# Patient Record
Sex: MALE | Race: WHITE | NOT HISPANIC OR LATINO | Employment: OTHER | URBAN - METROPOLITAN AREA
[De-identification: names, ages, dates, MRNs, and addresses within clinical notes are randomized per-mention and may not be internally consistent; named-entity substitution may affect disease eponyms.]

---

## 2017-01-03 ENCOUNTER — TELEPHONE (OUTPATIENT)
Dept: UROLOGY | Facility: CLINIC | Age: 82
End: 2017-01-03

## 2017-01-03 NOTE — TELEPHONE ENCOUNTER
----- Message from Giovana Rivera sent at 1/3/2017 12:24 PM CST -----  Contact: Kassy lEena needs you to call her to discuss patient. Please call her at [phone].

## 2017-01-05 ENCOUNTER — TELEPHONE (OUTPATIENT)
Dept: UROLOGY | Facility: CLINIC | Age: 82
End: 2017-01-05

## 2017-05-09 ENCOUNTER — TELEPHONE (OUTPATIENT)
Dept: UROLOGY | Facility: CLINIC | Age: 82
End: 2017-05-09

## 2017-05-22 ENCOUNTER — PROCEDURE VISIT (OUTPATIENT)
Dept: UROLOGY | Facility: CLINIC | Age: 82
End: 2017-05-22
Payer: MEDICARE

## 2017-05-22 VITALS — WEIGHT: 174.19 LBS | HEIGHT: 72 IN | BODY MASS INDEX: 23.59 KG/M2

## 2017-05-22 DIAGNOSIS — C67.9 MALIGNANT NEOPLASM OF URINARY BLADDER, UNSPECIFIED SITE: Primary | ICD-10-CM

## 2017-05-22 LAB
BILIRUB SERPL-MCNC: ABNORMAL MG/DL
BLOOD URINE, POC: ABNORMAL
COLOR, POC UA: YELLOW
GLUCOSE UR QL STRIP: ABNORMAL
KETONES UR QL STRIP: ABNORMAL
LEUKOCYTE ESTERASE URINE, POC: ABNORMAL
NITRITE, POC UA: ABNORMAL
PH, POC UA: 7
PROTEIN, POC: ABNORMAL
SPECIFIC GRAVITY, POC UA: 1.01
UROBILINOGEN, POC UA: ABNORMAL

## 2017-05-22 PROCEDURE — 88112 CYTOPATH CELL ENHANCE TECH: CPT | Mod: 26,,, | Performed by: PATHOLOGY

## 2017-05-22 PROCEDURE — 52000 CYSTOURETHROSCOPY: CPT | Mod: PBBFAC,PO | Performed by: UROLOGY

## 2017-05-22 PROCEDURE — 88112 CYTOPATH CELL ENHANCE TECH: CPT | Performed by: PATHOLOGY

## 2017-05-22 PROCEDURE — 81002 URINALYSIS NONAUTO W/O SCOPE: CPT | Mod: PBBFAC,PO | Performed by: UROLOGY

## 2017-05-22 NOTE — PROCEDURES
"Cystoscopy  Date/Time: 5/22/2017 11:17 AM  Performed by: WISAM ROMAN  Authorized by: WISAM ROMAN     Consent Done?:  Yes (Written)  Time out: Immediately prior to procedure a "time out" was called to verify the correct patient, procedure, equipment, support staff and site/side marked as required.    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 He completed a course of BCG. He is asymptomatic today. Cysto 12 months ago was negative. Last recurrence 4/14. He has no complaints today.  He dysuria.  Head hematuria on a couple of occasion in the last year.    The flexible cystoscope was placed into the urethra and carefully advanced into the bladder.  A careful cystoscopic exam was then performed.  The entire bladder mucosa was systematically visualized.   Findings include moderate bladder wall trabeculation.  There was a possible small recurrence on right posterior wall.  Otherwise the mucosa appeared normal   Each ureteral orifices were visualized and both had clear efflux of urine.  Barbotage was performed and washings were collected for cytological evaluation.  The cystoscope was then removed and I examined the entire length of the urethra.  There was moderate trilobar enlargement of the prostate otherwise the urethra appeared normal.  He tolerated the procedure well.  There were no complications    Impression:  ? Recurrence.  Will observe for now.  Follow up in 6 months with another cystoscopy.    "

## 2017-05-25 ENCOUNTER — TELEPHONE (OUTPATIENT)
Dept: UROLOGY | Facility: CLINIC | Age: 82
End: 2017-05-25

## 2017-05-25 NOTE — TELEPHONE ENCOUNTER
----- Message from WISAM Mccall MD sent at 5/24/2017  5:31 PM CDT -----  Call with urine cytology.  Negative.

## 2017-06-01 ENCOUNTER — OFFICE VISIT (OUTPATIENT)
Dept: SURGERY | Facility: CLINIC | Age: 82
End: 2017-06-01
Payer: MEDICARE

## 2017-06-01 VITALS
TEMPERATURE: 97 F | HEART RATE: 101 BPM | BODY MASS INDEX: 23.57 KG/M2 | SYSTOLIC BLOOD PRESSURE: 118 MMHG | HEIGHT: 72 IN | WEIGHT: 174 LBS | DIASTOLIC BLOOD PRESSURE: 64 MMHG

## 2017-06-01 DIAGNOSIS — K64.1 SECOND DEGREE HEMORRHOIDS: Primary | ICD-10-CM

## 2017-06-01 PROCEDURE — 99999 PR PBB SHADOW E&M-EST. PATIENT-LVL III: CPT | Mod: PBBFAC,,, | Performed by: SURGERY

## 2017-06-01 PROCEDURE — 99213 OFFICE O/P EST LOW 20 MIN: CPT | Mod: PBBFAC,PO | Performed by: SURGERY

## 2017-06-01 PROCEDURE — 99203 OFFICE O/P NEW LOW 30 MIN: CPT | Mod: S$PBB,25,, | Performed by: SURGERY

## 2017-06-01 PROCEDURE — 46600 DIAGNOSTIC ANOSCOPY SPX: CPT | Mod: PBBFAC,PO | Performed by: SURGERY

## 2017-06-01 PROCEDURE — 46600 DIAGNOSTIC ANOSCOPY SPX: CPT | Mod: S$PBB,,, | Performed by: SURGERY

## 2017-06-01 NOTE — PROGRESS NOTES
Subjective:       Patient ID: Fabian Shnie is a 85 y.o. male.    Chief Complaint: Consult (Hemorrhoids)    HPI  Pleasant 84 yo M referred to me for evaluation of his hemorrhoids.  He notes that for the last month he has had some swelling in the perianal that he feels is related to his hemorrhoids.  He notes that the swelling is somewhat uncomfortably.  He notes he has had some difficulty with hygiene secondary to this.  He denies fever/chills.  No n/v. No blood per rectum.  Pt denies ever having a cscope.   Review of Systems   Constitutional: Negative for activity change, appetite change, diaphoresis, fever and unexpected weight change.   Respiratory: Negative for cough, chest tightness and wheezing.    Cardiovascular: Negative for chest pain and palpitations.   Gastrointestinal: Negative for abdominal distention, abdominal pain, anal bleeding, blood in stool, constipation, diarrhea, nausea and vomiting.   Genitourinary: Negative for difficulty urinating, dysuria and hematuria.   Musculoskeletal: Negative for back pain and gait problem.   Skin: Negative for color change and wound.   Neurological: Negative for tremors and seizures.       Objective:      Physical Exam   Constitutional: He is oriented to person, place, and time. He appears well-developed and well-nourished.   HENT:   Head: Normocephalic and atraumatic.   Eyes: Conjunctivae are normal. Pupils are equal, round, and reactive to light.   Neck: Normal range of motion. Neck supple.   Cardiovascular: Normal rate and intact distal pulses.    No murmur heard.  Pulmonary/Chest: Breath sounds normal. No respiratory distress.   Abdominal: Soft. Bowel sounds are normal. He exhibits no distension and no mass. There is no tenderness. There is no rebound and no guarding. No hernia.   Genitourinary:   Genitourinary Comments: Ext exam demonstrates slight perianal skin tags from hermorrhoids on the L lateral and R ant position. Nonthrombosed.  BELTRAN with normal sphincter  tone.  Anoscopy performed with mild to moderate int hemorrhoids noted in the R ant , R post and L lateral columns.    Neurological: He is alert and oriented to person, place, and time.   Skin: Skin is warm.   Psychiatric: He has a normal mood and affect. His behavior is normal.   Vitals reviewed.      Assessment:     Hem orrhoids.   No diagnosis found.    Plan:       D/w pt.  I have recommended increasing fiber in male diet and to also begin using a fiber supplement (metamucil or psyillium husk).  Will monitor for improvement and if no significant improvement will consider more aggressive treatment.

## 2017-06-01 NOTE — LETTER
June 1, 2017      WISAM Mccall MD  [address] [facility] [address] [place] [address]           [facility]  [address] [facility] [address] [place] [address]  Phone: [phone]          Patient: Fabian Shine   MR Number: [id number]   YOB: 1932   Date of Visit: 6/1/2017       Dear Dr. WISAM Mccall:    Thank you for referring Fabian Shine to me for evaluation. Attached you will find relevant portions of my assessment and plan of care.    If you have questions, please do not hesitate to call me. I look forward to following Fabian Shine along with you.    Sincerely,    Willis Huang MD    Enclosure  CC:  No Recipients    If you would like to receive this communication electronically, please contact [email or web address] or [phone] to request more information on [employer] Link access.    For providers and/or their staff who would like to refer a patient to [facility], please contact us through our one-stop-shop provider referral line, [facility] , at [phone].    If you feel you have received this communication in error or would no longer like to receive these types of communications, please e-mail [email or web address]

## 2017-06-26 PROBLEM — N40.0 BENIGN NON-NODULAR PROSTATIC HYPERPLASIA WITHOUT LOWER URINARY TRACT SYMPTOMS: Status: ACTIVE | Noted: 2017-06-26

## 2017-06-26 PROBLEM — I50.22 CHRONIC SYSTOLIC CONGESTIVE HEART FAILURE: Status: ACTIVE | Noted: 2017-06-26

## 2017-06-26 PROBLEM — Z86.2 HISTORY OF IRON DEFICIENCY ANEMIA: Status: ACTIVE | Noted: 2017-06-26

## 2017-06-26 PROBLEM — G25.81 RESTLESS LEG SYNDROME, CONTROLLED: Status: ACTIVE | Noted: 2017-06-26

## 2017-08-17 ENCOUNTER — OFFICE VISIT (OUTPATIENT)
Dept: VASCULAR SURGERY | Facility: CLINIC | Age: 82
End: 2017-08-17
Payer: MEDICARE

## 2017-08-17 ENCOUNTER — TELEPHONE (OUTPATIENT)
Dept: VASCULAR SURGERY | Facility: CLINIC | Age: 82
End: 2017-08-17

## 2017-08-17 VITALS
WEIGHT: 176 LBS | SYSTOLIC BLOOD PRESSURE: 103 MMHG | HEIGHT: 72 IN | HEART RATE: 100 BPM | BODY MASS INDEX: 23.84 KG/M2 | DIASTOLIC BLOOD PRESSURE: 61 MMHG

## 2017-08-17 DIAGNOSIS — I73.9 PERIPHERAL VASCULAR DISEASE, UNSPECIFIED: Primary | ICD-10-CM

## 2017-08-17 DIAGNOSIS — I73.9 PAD (PERIPHERAL ARTERY DISEASE): ICD-10-CM

## 2017-08-17 PROCEDURE — 3008F BODY MASS INDEX DOCD: CPT | Mod: ,,, | Performed by: THORACIC SURGERY (CARDIOTHORACIC VASCULAR SURGERY)

## 2017-08-17 PROCEDURE — 99999 PR PBB SHADOW E&M-EST. PATIENT-LVL III: CPT | Mod: PBBFAC,,, | Performed by: THORACIC SURGERY (CARDIOTHORACIC VASCULAR SURGERY)

## 2017-08-17 PROCEDURE — 1159F MED LIST DOCD IN RCRD: CPT | Mod: ,,, | Performed by: THORACIC SURGERY (CARDIOTHORACIC VASCULAR SURGERY)

## 2017-08-17 PROCEDURE — 3078F DIAST BP <80 MM HG: CPT | Mod: ,,, | Performed by: THORACIC SURGERY (CARDIOTHORACIC VASCULAR SURGERY)

## 2017-08-17 PROCEDURE — 3074F SYST BP LT 130 MM HG: CPT | Mod: ,,, | Performed by: THORACIC SURGERY (CARDIOTHORACIC VASCULAR SURGERY)

## 2017-08-17 PROCEDURE — 99213 OFFICE O/P EST LOW 20 MIN: CPT | Mod: PBBFAC,PO | Performed by: THORACIC SURGERY (CARDIOTHORACIC VASCULAR SURGERY)

## 2017-08-17 PROCEDURE — 1126F AMNT PAIN NOTED NONE PRSNT: CPT | Mod: ,,, | Performed by: THORACIC SURGERY (CARDIOTHORACIC VASCULAR SURGERY)

## 2017-08-17 PROCEDURE — 99203 OFFICE O/P NEW LOW 30 MIN: CPT | Mod: S$PBB,,, | Performed by: THORACIC SURGERY (CARDIOTHORACIC VASCULAR SURGERY)

## 2017-08-17 RX ORDER — SODIUM CHLORIDE 9 MG/ML
INJECTION, SOLUTION INTRAVENOUS CONTINUOUS
Status: CANCELLED | OUTPATIENT
Start: 2017-08-17

## 2017-08-17 NOTE — PROGRESS NOTES
OFFICE NOTE    This 85-year-old gentleman with bilateral lower extremity claudication, has a   history of vascular disease and angiography, previous stenting of the right   lower extremity.  He has a sore in the left great toe that has been present for   a fairly long period of time without healing.  He has seen a podiatrist to his   exercising local care for the left great toe.  He has chronic atrial   fibrillation.  His medicines are noted.  They are part of the recent EPIC   record.  He is taking Xarelto.  He has had no recent surgeries.  Nonsmoker.  He   is not diabetic.  Denies any significant coronary disease.    PHYSICAL EXAMINATION:  VITAL SIGNS:  Stable.  HEENT:  Pupils are equal, round, reactive to light.  Nose and throat are clear.  NECK:  Supple.  CHEST:  Clear to auscultation.  HEART:  Irregular rate and rhythm.  ABDOMEN:  Benign.  EXTREMITIES:  Femoral pulses are equal.  Pedal pulses are not palpable.  He has   a tiny punctate ulcer on the right great toe associated with slight erythema.    Doppler signals diminished.  The most recent ultrasound was from some months ago   and he appears to have significant popliteal artery occlusive disease.  At this   point, we will arrange for angiography of the extremities and based on this,   make further recommendations.  Hopefully, we can improve the circulation to the   left lower extremity.      PATEL  dd: 08/17/2017 08:42:25 (CDT)  td: 08/17/2017 15:38:54 (CDT)  Doc ID   #[id number]  Job ID #[id number]    CC:

## 2017-08-17 NOTE — TELEPHONE ENCOUNTER
----- Message from Barb Garibay sent at 8/17/2017  9:54 AM CDT -----  Contact: wife  Wife - Kassy Shine - [phone] is calling to speak with nurse asking if patient needs to stop his Xarelto 3 days before angiogram?/please advise

## 2017-08-17 NOTE — LETTER
August 17, 2017      [address] [address]           [facility]  [address] [facility] [address] [place] [address]  Phone: [phone]          Patient: Fabian Shine   MR Number: [id number]   YOB: 1932   Date of Visit: 8/17/2017       Dear [facility]:    Thank you for referring Fabian Shine to me for evaluation. Attached you will find relevant portions of my assessment and plan of care.    If you have questions, please do not hesitate to call me. I look forward to following Fabian Shine along with you.    Sincerely,    Sundeep Cota MD    Enclosure  CC:  No Recipients    If you would like to receive this communication electronically, please contact [email or web address] or [phone] to request more information on [employer] Link access.    For providers and/or their staff who would like to refer a patient to [facility], please contact us through our one-stop-shop provider referral line, [facility], at [phone].    If you feel you have received this communication in error or would no longer like to receive these types of communications, please e-mail [email or web address]

## 2017-08-23 PROBLEM — I73.9 PERIPHERAL VASCULAR DISEASE, UNSPECIFIED: Status: ACTIVE | Noted: 2017-08-23

## 2017-08-24 ENCOUNTER — TELEPHONE (OUTPATIENT)
Dept: VASCULAR SURGERY | Facility: CLINIC | Age: 82
End: 2017-08-24

## 2017-08-24 NOTE — TELEPHONE ENCOUNTER
----- Message from Sonia Luu sent at 8/24/2017 11:56 AM CDT -----  Contact: Sina Shine (Spouse)  Sina Shine (Spouse) calling in regards to scheduling a f/u appt after the angiogram yesterday. I informed the spouse, I am to schedule it but she insists on speaking with the Nurse. Please advise. Call to pod. No answer.  Call back   Thanks!

## 2017-08-31 ENCOUNTER — OFFICE VISIT (OUTPATIENT)
Dept: VASCULAR SURGERY | Facility: CLINIC | Age: 82
End: 2017-08-31
Payer: MEDICARE

## 2017-08-31 VITALS — DIASTOLIC BLOOD PRESSURE: 65 MMHG | HEART RATE: 88 BPM | SYSTOLIC BLOOD PRESSURE: 120 MMHG

## 2017-08-31 DIAGNOSIS — Z98.62 S/P ANGIOPLASTIES: Primary | ICD-10-CM

## 2017-08-31 PROCEDURE — 99212 OFFICE O/P EST SF 10 MIN: CPT | Mod: PBBFAC,PO | Performed by: THORACIC SURGERY (CARDIOTHORACIC VASCULAR SURGERY)

## 2017-08-31 PROCEDURE — 99999 PR PBB SHADOW E&M-EST. PATIENT-LVL II: CPT | Mod: PBBFAC,,, | Performed by: THORACIC SURGERY (CARDIOTHORACIC VASCULAR SURGERY)

## 2017-08-31 PROCEDURE — 99211 OFF/OP EST MAY X REQ PHY/QHP: CPT | Mod: S$PBB,,, | Performed by: THORACIC SURGERY (CARDIOTHORACIC VASCULAR SURGERY)

## 2017-11-20 ENCOUNTER — PROCEDURE VISIT (OUTPATIENT)
Dept: UROLOGY | Facility: CLINIC | Age: 82
End: 2017-11-20
Payer: MEDICARE

## 2017-11-20 VITALS
BODY MASS INDEX: 23.59 KG/M2 | HEIGHT: 72 IN | HEART RATE: 56 BPM | SYSTOLIC BLOOD PRESSURE: 110 MMHG | WEIGHT: 174.19 LBS | DIASTOLIC BLOOD PRESSURE: 74 MMHG

## 2017-11-20 DIAGNOSIS — N30.91 CYSTITIS WITH HEMATURIA: ICD-10-CM

## 2017-11-20 DIAGNOSIS — C67.9 MALIGNANT NEOPLASM OF URINARY BLADDER, UNSPECIFIED SITE: Primary | ICD-10-CM

## 2017-11-20 LAB
BILIRUB SERPL-MCNC: ABNORMAL MG/DL
BLOOD URINE, POC: ABNORMAL
COLOR, POC UA: YELLOW
GLUCOSE UR QL STRIP: ABNORMAL
KETONES UR QL STRIP: ABNORMAL
LEUKOCYTE ESTERASE URINE, POC: ABNORMAL
NITRITE, POC UA: ABNORMAL
PH, POC UA: 8
PROTEIN, POC: ABNORMAL
SPECIFIC GRAVITY, POC UA: 1
UROBILINOGEN, POC UA: ABNORMAL

## 2017-11-20 PROCEDURE — 87086 URINE CULTURE/COLONY COUNT: CPT

## 2017-11-20 PROCEDURE — 87077 CULTURE AEROBIC IDENTIFY: CPT

## 2017-11-20 PROCEDURE — 81002 URINALYSIS NONAUTO W/O SCOPE: CPT | Mod: PBBFAC,PO | Performed by: UROLOGY

## 2017-11-20 PROCEDURE — 99213 OFFICE O/P EST LOW 20 MIN: CPT | Mod: S$PBB,,, | Performed by: UROLOGY

## 2017-11-20 PROCEDURE — 87088 URINE BACTERIA CULTURE: CPT

## 2017-11-20 PROCEDURE — 87186 SC STD MICRODIL/AGAR DIL: CPT

## 2017-11-20 RX ORDER — CIPROFLOXACIN 500 MG/1
500 TABLET ORAL 2 TIMES DAILY
Qty: 20 TABLET | Refills: 0 | Status: SHIPPED | OUTPATIENT
Start: 2017-11-20 | End: 2017-12-04

## 2017-11-20 NOTE — PROCEDURES
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 He completed a course of BCG. He is having intermittent hematuria and occasional dysuria.  Last cysto 12 months ago was negative. Last recurrence 4/14.   He is scheduled for surveillance cystoscopy today but UA suggest an infection.  Urine dipstick shows positive for 2+blood, positive for nitrates and positive for 2+leukocytes.  Micro exam: tntc WBC's per HPF, tntc RBC's per HPF and 2+ bacteria.    Review of Systems   Constitutional: Negative for fever and unexpected weight change.   Genitourinary: Positive for dysuria and hematuria.   Musculoskeletal: Negative for back pain and gait problem.   Skin: No rash      Objective:      Physical Exam   Constitutional: He is oriented to person, place, and time. He appears well-developed and well-nourished.   HENT:   Head: Normocephalic and atraumatic.   Eyes: Conjunctivae are normal.   Pulmonary/Chest: No respiratory distress.   Abdominal: Soft. no distension and no mass. There is no tenderness.   Genitourinary:  No CVA tenderness  Neurological: He is alert and oriented to person, place, and time.   Psychiatric: He has a normal mood and affect. His behavior is normal.   Vitals reviewed.      Assessment:     Bladder cancer  Cystitis with hematuria.    Plan  Hold cystoscopy for now  F/u urine culture.  Empiric Cipro.  Rx sent to pharmacy

## 2017-11-25 LAB — BACTERIA UR CULT: NORMAL

## 2017-12-06 ENCOUNTER — PROCEDURE VISIT (OUTPATIENT)
Dept: UROLOGY | Facility: CLINIC | Age: 82
End: 2017-12-06
Payer: MEDICARE

## 2017-12-06 VITALS
BODY MASS INDEX: 23.59 KG/M2 | SYSTOLIC BLOOD PRESSURE: 122 MMHG | HEIGHT: 72 IN | HEART RATE: 69 BPM | WEIGHT: 174.19 LBS | DIASTOLIC BLOOD PRESSURE: 70 MMHG

## 2017-12-06 DIAGNOSIS — Z85.51 HISTORY OF BLADDER CANCER: Primary | ICD-10-CM

## 2017-12-06 LAB
BILIRUB SERPL-MCNC: ABNORMAL MG/DL
BLOOD URINE, POC: ABNORMAL
COLOR, POC UA: YELLOW
GLUCOSE UR QL STRIP: ABNORMAL
KETONES UR QL STRIP: ABNORMAL
LEUKOCYTE ESTERASE URINE, POC: ABNORMAL
NITRITE, POC UA: ABNORMAL
PH, POC UA: 8
PROTEIN, POC: ABNORMAL
SPECIFIC GRAVITY, POC UA: 1.01
UROBILINOGEN, POC UA: ABNORMAL

## 2017-12-06 PROCEDURE — 88112 CYTOPATH CELL ENHANCE TECH: CPT | Performed by: PATHOLOGY

## 2017-12-06 PROCEDURE — 81002 URINALYSIS NONAUTO W/O SCOPE: CPT | Mod: PBBFAC,PO | Performed by: UROLOGY

## 2017-12-06 PROCEDURE — 88112 CYTOPATH CELL ENHANCE TECH: CPT | Mod: 26,,, | Performed by: PATHOLOGY

## 2017-12-06 PROCEDURE — 52000 CYSTOURETHROSCOPY: CPT | Mod: PBBFAC,PO | Performed by: UROLOGY

## 2017-12-06 NOTE — PROCEDURES
"Cystoscopy  Date/Time: 12/6/2017 3:19 PM  Performed by: WISAM ROMAN  Authorized by: WISAM ROMAN     Consent Done?:  Yes (Written)  Time out: Immediately prior to procedure a "time out" was called to verify the correct patient, procedure, equipment, support staff and site/side marked as required.    Indications: history bladder cancer    Anesthesia:  Lidocaine jelly  Patient sedated?: No    Preparation: Patient was prepped and draped in usual sterile fashion      Scope type:  Flexible cystoscope    Patient tolerance:  Patient tolerated the procedure well with no immediate complications    Blood Loss:  None    The patients clinic history and physical were reviewed and there are no changes.    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 He completed a course of BCG. He is asymptomatic today. Cysto 6 months ago was negative except one suspicious area. Last recurrence 4/14. He has no complaints today.  He dysuria.         The flexible cystoscope was placed into the urethra and carefully advanced into the bladder.  A careful cystoscopic exam was then performed.  The entire bladder mucosa was systematically visualized.   Findings include moderate bladder wall trabeculation.  There was a possible small recurrence on right posterior wall.  This is unchanged in the last 6 months.  Otherwise the mucosa appeared normal   Each ureteral orifices were visualized and both had clear efflux of urine.  Barbotage was performed and washings were collected for cytological evaluation.  The cystoscope was then removed and I examined the entire length of the urethra.  There was moderate trilobar enlargement of the prostate otherwise the urethra appeared normal.  He tolerated the procedure well.  There were no complications     Impression:  ? Recurrence.  We will continue to  observe for now.  Follow up in 12 months with " another cystoscopy.

## 2018-01-19 ENCOUNTER — OFFICE VISIT (OUTPATIENT)
Dept: SURGERY | Facility: CLINIC | Age: 83
End: 2018-01-19
Payer: MEDICARE

## 2018-01-19 VITALS
SYSTOLIC BLOOD PRESSURE: 125 MMHG | BODY MASS INDEX: 23.81 KG/M2 | HEIGHT: 72 IN | TEMPERATURE: 97 F | DIASTOLIC BLOOD PRESSURE: 60 MMHG | WEIGHT: 175.81 LBS | HEART RATE: 73 BPM

## 2018-01-19 DIAGNOSIS — K64.1 GRADE II HEMORRHOIDS: Primary | ICD-10-CM

## 2018-01-19 DIAGNOSIS — L29.0 PRURITUS ANI: ICD-10-CM

## 2018-01-19 PROCEDURE — 99213 OFFICE O/P EST LOW 20 MIN: CPT | Mod: S$PBB,25,, | Performed by: SURGERY

## 2018-01-19 PROCEDURE — 99213 OFFICE O/P EST LOW 20 MIN: CPT | Mod: PBBFAC,PO,25 | Performed by: SURGERY

## 2018-01-19 PROCEDURE — 46600 DIAGNOSTIC ANOSCOPY SPX: CPT | Mod: S$PBB,,, | Performed by: SURGERY

## 2018-01-19 PROCEDURE — 46600 DIAGNOSTIC ANOSCOPY SPX: CPT | Mod: PBBFAC,PO | Performed by: SURGERY

## 2018-01-19 PROCEDURE — 99999 PR PBB SHADOW E&M-EST. PATIENT-LVL III: CPT | Mod: PBBFAC,,, | Performed by: SURGERY

## 2018-01-19 NOTE — PROGRESS NOTES
Pleasant 84 yo M whom I have seen in the past regarding his hemorrhoids.  He notes that he has had increased pressure and swelling internally that is causing him discomfort.  NOtes that he has had some bleeding and that in recent months it has become very debilitating.  Pt denies n/v.  No fever/chills.      Pt is on xarelto      AFVSS  AAOx  Soft/nd  REctal: exam performed in L lateral position as pt is extremely debilitated and weakened.    MIld ext hemorrhoids noted in L lateral and R ant position.  BELTRAN with normal sphicnter tone.  Anoscopy demonstrating moderates sized int hemorrhoid in R ant, L lateral position    A?P: Hemorrhoids; pruritus ani    D/w pt.  I have recommended increasing fiber in male diet and to also begin using a fiber supplement (metamucil or psyillium husk).  Will monitor for improvement and if no significant improvement will consider more aggressive treatment  Also encouraged barrier cream to be applied daily

## 2018-01-19 NOTE — LETTER
January 19, 2018      Trey Hanks, DO  [address]  [address]  [place] [address]           [facility]  [address] [facility] [address] [place] [address]  Phone: [phone]          Patient: Fabian Shine   MR Number: [id number]   YOB: 1932   Date of Visit: 1/19/2018       Dear Dr. Trey Hanks:    Thank you for referring Fabian Shine to me for evaluation. Attached you will find relevant portions of my assessment and plan of care.    If you have questions, please do not hesitate to call me. I look forward to following Fabian Shine along with you.    Sincerely,    Willis Huang MD    Enclosure  CC:  No Recipients    If you would like to receive this communication electronically, please contact [email or web address] or [phone] to request more information on [employer] Link access.    For providers and/or their staff who would like to refer a patient to [facility], please contact us through our one-stop-shop provider referral line, [facility], at [phone].    If you feel you have received this communication in error or would no longer like to receive these types of communications, please e-mail [email or web address]

## 2018-04-09 ENCOUNTER — TELEPHONE (OUTPATIENT)
Dept: SURGERY | Facility: CLINIC | Age: 83
End: 2018-04-09

## 2018-04-09 NOTE — TELEPHONE ENCOUNTER
----- Message from Loan Rubalcava sent at 4/9/2018  9:12 AM CDT -----  Contact: Nathalie with Dr Redmond Office   Dr Redmond [facility] needs to have nurse fax over Clinical notes for patient     Please fax to [phone]

## 2018-04-17 PROBLEM — K64.9 HEMORRHOIDS: Status: ACTIVE | Noted: 2018-04-17

## 2018-04-26 ENCOUNTER — OFFICE VISIT (OUTPATIENT)
Dept: SURGERY | Facility: CLINIC | Age: 83
End: 2018-04-26
Payer: MEDICARE

## 2018-04-26 VITALS — SYSTOLIC BLOOD PRESSURE: 123 MMHG | DIASTOLIC BLOOD PRESSURE: 58 MMHG | HEART RATE: 94 BPM

## 2018-04-26 DIAGNOSIS — K62.89 MASS IN RECTUM: Primary | ICD-10-CM

## 2018-04-26 DIAGNOSIS — C20 RECTAL CANCER: ICD-10-CM

## 2018-04-26 DIAGNOSIS — K62.89 OTHER SPECIFIED DISEASES OF ANUS AND RECTUM: ICD-10-CM

## 2018-04-26 PROCEDURE — 99999 PR PBB SHADOW E&M-EST. PATIENT-LVL III: CPT | Mod: PBBFAC,,, | Performed by: SURGERY

## 2018-04-26 PROCEDURE — 99213 OFFICE O/P EST LOW 20 MIN: CPT | Mod: PBBFAC,PO | Performed by: SURGERY

## 2018-04-26 PROCEDURE — 99214 OFFICE O/P EST MOD 30 MIN: CPT | Mod: S$PBB,,, | Performed by: SURGERY

## 2018-04-26 RX ORDER — BROMPHENIRAMINE MALEATE, DEXTROMETHORPHAN HBR, PHENYLEPHRINE HCL, DIPHENHYDRAMINE HCL, PHENYLEPHRINE HCL 0.52G
0.52 KIT ORAL DAILY
COMMUNITY
End: 2020-06-11

## 2018-04-26 NOTE — PATIENT INSTRUCTIONS
Flexible sigmoidoscopy is scheduled for 05/03/18 arrival time per the preop nurse.  Preop will call from [phone]  Fasting after midnight  Someone to drive you home    PLEASE NOTE THAT SURGERY TIMES CAN CHANGE THE PREOP NURSE WILL GIVE THE ARRIVAL TIME WHEN SHE/HE CALLS.  THE PREOP NURSE WILL CALL, SOMETIMES AS LATE AS 4 PM IN THE AFTERNOON THE DAY BEFORE SURGERY.        Special Instruction: Fleet Enema Prep

## 2018-04-26 NOTE — PROGRESS NOTES
Subjective:       Patient ID: Fabian Shine is a 86 y.o. male.    Chief Complaint: Follow-up    HPI  Pleasant 85 yo M whom I  Have seen in the past for hemorrhoids.  Pt had never had a colonoscopy and at time of my visit was not interested.  Pt notes that he had progressive changes in bowel habits leading to him having a colonoscopy with Dr Redmond which demonstrated a mass in mid to upper rectum between 2nd and third valve of [place].  This was estimated to be 12 cm from anal verge.  Pathology unfortunately confirmed adencarcinoma.  PT denies pain. No changes in appetite or in weight.  Pt presents to discuss mgmt.  Pt is week and reliant on wheel chair for trasnportation.  He suffers from PAD withstent placment and afib.    Review of Systems   Constitutional: Negative for activity change, appetite change, diaphoresis, fever and unexpected weight change.   HENT: Negative for congestion and hearing loss.    Respiratory: Negative for cough, chest tightness and wheezing.    Cardiovascular: Negative for chest pain and palpitations.   Gastrointestinal: Positive for anal bleeding and diarrhea. Negative for abdominal distention, abdominal pain, blood in stool, constipation, nausea, rectal pain and vomiting.   Genitourinary: Negative for difficulty urinating, dysuria and hematuria.   Musculoskeletal: Negative for back pain and gait problem.   Skin: Negative for color change and wound.   Neurological: Negative for tremors and seizures.   Hematological: Negative for adenopathy. Does not bruise/bleed easily.   Psychiatric/Behavioral: Negative for agitation and decreased concentration.       Objective:      Physical Exam   Constitutional: He is oriented to person, place, and time. He appears well-developed and well-nourished.   HENT:   Head: Normocephalic and atraumatic.   Eyes: Pupils are equal, round, and reactive to light. Right eye exhibits no discharge. Left eye exhibits no discharge. No scleral icterus.   Neck: Normal  range of motion. No tracheal deviation present. No thyromegaly present.   Cardiovascular: Normal rate, regular rhythm and normal heart sounds.  Exam reveals no gallop and no friction rub.    No murmur heard.  Pulmonary/Chest: Effort normal and breath sounds normal. He has no wheezes. He exhibits no tenderness.   Abdominal: He exhibits no distension and no mass. There is no tenderness. There is no rebound and no guarding. No hernia.   Genitourinary:   Genitourinary Comments: Ext exam some slight ext hemorrhoids noted.  BELTRAN with no mass palpated.     Musculoskeletal: Normal range of motion.   Lymphadenopathy:     He has no cervical adenopathy.   Neurological: He is alert and oriented to person, place, and time. Coordination normal.   Skin: Skin is warm. No rash noted. No erythema. No pallor.   Psychiatric: He has a normal mood and affect. His behavior is normal.   Vitals reviewed.         Colonoscopy report and images reviewed.  Mass at 12 cm    Path; adenocarcinoma    Assessment:       1. Mass in rectum    2. Rectal cancer    3. Other specified diseases of anus and rectum        Plan:       Lengthy d/w pt and his wife.  I have explained to them that he has a rectal cancer in the proximal to mid rectum.  D/w them multiple treatment options.  Need to determine location of tumor in relation to the peritoneal reflection and also in regards to your circumferential radial margin.  This has been d/w Dr Redmond and agree that MRI will best give this information.  WIll plan a flex sig so I can also visualize proximal extent of tumor.  D/w pt that if lesion in proximal rectum and CRM appears intact would likely proceed with surgery.  Given frail nature of pt do not feel that he would tolerate radiation treatment well.  Pt agrees to this plan.  Will also obtain a ct scan of chest/abd/pelvis to evaluate for any metastatic disease.

## 2018-04-27 ENCOUNTER — TELEPHONE (OUTPATIENT)
Dept: SURGERY | Facility: CLINIC | Age: 83
End: 2018-04-27

## 2018-04-27 RX ORDER — SODIUM CHLORIDE, SODIUM LACTATE, POTASSIUM CHLORIDE, CALCIUM CHLORIDE 600; 310; 30; 20 MG/100ML; MG/100ML; MG/100ML; MG/100ML
INJECTION, SOLUTION INTRAVENOUS CONTINUOUS
Status: CANCELLED | OUTPATIENT
Start: 2018-04-27

## 2018-04-27 NOTE — TELEPHONE ENCOUNTER
----- Message from Padma Cho sent at 4/27/2018 11:05 AM CDT -----  Contact: wife- Kassy- [phone]  Type: Needs Medical Advice    Who Called:  wife  Symptoms (please be specific):    How long has patient had these symptoms:    Pharmacy name and phone #:    Best Call Back Number:[phone]  Additional Information: The wife asking to discuss patient's colonoscopy

## 2018-04-30 ENCOUNTER — HOSPITAL ENCOUNTER (OUTPATIENT)
Dept: RADIOLOGY | Facility: HOSPITAL | Age: 83
Discharge: HOME | End: 2018-04-30
Attending: SURGERY
Payer: MEDICARE

## 2018-04-30 DIAGNOSIS — C20 RECTAL CANCER: ICD-10-CM

## 2018-04-30 PROCEDURE — 71260 CT THORAX DX C+: CPT | Mod: 26,,, | Performed by: RADIOLOGY

## 2018-04-30 PROCEDURE — 74177 CT ABD & PELVIS W/CONTRAST: CPT | Mod: 26,,, | Performed by: RADIOLOGY

## 2018-04-30 PROCEDURE — 74177 CT ABD & PELVIS W/CONTRAST: CPT | Mod: TC,PO

## 2018-04-30 PROCEDURE — 25500020 PHARM REV CODE 255: Mod: PO | Performed by: SURGERY

## 2018-04-30 RX ADMIN — IOHEXOL 75 ML: 350 INJECTION, SOLUTION INTRAVENOUS at 12:04

## 2018-04-30 RX ADMIN — IOHEXOL 30 ML: 350 INJECTION, SOLUTION INTRAVENOUS at 12:04

## 2018-05-03 ENCOUNTER — HOSPITAL ENCOUNTER (OUTPATIENT)
Dept: RADIOLOGY | Facility: HOSPITAL | Age: 83
Discharge: HOME | End: 2018-05-03
Attending: SURGERY | Admitting: SURGERY
Payer: MEDICARE

## 2018-05-03 ENCOUNTER — ANESTHESIA (OUTPATIENT)
Dept: ENDOSCOPY | Facility: HOSPITAL | Age: 83
End: 2018-05-03
Payer: MEDICARE

## 2018-05-03 ENCOUNTER — OFFICE VISIT (OUTPATIENT)
Dept: SURGERY | Facility: CLINIC | Age: 83
End: 2018-05-03
Payer: MEDICARE

## 2018-05-03 ENCOUNTER — HOSPITAL ENCOUNTER (OUTPATIENT)
Facility: HOSPITAL | Age: 83
Discharge: HOME | End: 2018-05-03
Attending: SURGERY | Admitting: SURGERY
Payer: MEDICARE

## 2018-05-03 ENCOUNTER — ANESTHESIA EVENT (OUTPATIENT)
Dept: ENDOSCOPY | Facility: HOSPITAL | Age: 83
End: 2018-05-03
Payer: MEDICARE

## 2018-05-03 VITALS
OXYGEN SATURATION: 98 % | SYSTOLIC BLOOD PRESSURE: 144 MMHG | HEIGHT: 72 IN | HEART RATE: 80 BPM | TEMPERATURE: 98 F | WEIGHT: 176 LBS | DIASTOLIC BLOOD PRESSURE: 80 MMHG | RESPIRATION RATE: 16 BRPM | BODY MASS INDEX: 23.84 KG/M2

## 2018-05-03 DIAGNOSIS — C20 RECTAL CANCER: ICD-10-CM

## 2018-05-03 DIAGNOSIS — K62.89 MASS IN RECTUM: ICD-10-CM

## 2018-05-03 DIAGNOSIS — C20 RECTAL CANCER: Primary | ICD-10-CM

## 2018-05-03 PROCEDURE — 72195 MRI PELVIS W/O DYE: CPT | Mod: 26,,, | Performed by: RADIOLOGY

## 2018-05-03 PROCEDURE — 45330 DIAGNOSTIC SIGMOIDOSCOPY: CPT | Mod: ,,, | Performed by: SURGERY

## 2018-05-03 PROCEDURE — 45330 DIAGNOSTIC SIGMOIDOSCOPY: CPT | Mod: PO | Performed by: SURGERY

## 2018-05-03 PROCEDURE — 63600175 PHARM REV CODE 636 W HCPCS: Mod: PO | Performed by: NURSE ANESTHETIST, CERTIFIED REGISTERED

## 2018-05-03 PROCEDURE — D9220A PRA ANESTHESIA: Mod: ANES,,, | Performed by: ANESTHESIOLOGY

## 2018-05-03 PROCEDURE — 99999 PR PBB SHADOW E&M-EST. PATIENT-LVL II: CPT | Mod: PBBFAC,,, | Performed by: SURGERY

## 2018-05-03 PROCEDURE — D9220A PRA ANESTHESIA: Mod: CRNA,,, | Performed by: NURSE ANESTHETIST, CERTIFIED REGISTERED

## 2018-05-03 PROCEDURE — 99212 OFFICE O/P EST SF 10 MIN: CPT | Mod: PBBFAC,PO | Performed by: SURGERY

## 2018-05-03 PROCEDURE — 99024 POSTOP FOLLOW-UP VISIT: CPT | Mod: POP,,, | Performed by: SURGERY

## 2018-05-03 PROCEDURE — 72195 MRI PELVIS W/O DYE: CPT | Mod: TC,PO

## 2018-05-03 PROCEDURE — 25000003 PHARM REV CODE 250: Mod: PO | Performed by: SURGERY

## 2018-05-03 PROCEDURE — 37000008 HC ANESTHESIA 1ST 15 MINUTES: Mod: PO | Performed by: SURGERY

## 2018-05-03 RX ORDER — LIDOCAINE HYDROCHLORIDE 10 MG/ML
1 INJECTION, SOLUTION EPIDURAL; INFILTRATION; INTRACAUDAL; PERINEURAL ONCE
Status: COMPLETED | OUTPATIENT
Start: 2018-05-03 | End: 2018-05-03

## 2018-05-03 RX ORDER — SODIUM CHLORIDE, SODIUM LACTATE, POTASSIUM CHLORIDE, CALCIUM CHLORIDE 600; 310; 30; 20 MG/100ML; MG/100ML; MG/100ML; MG/100ML
INJECTION, SOLUTION INTRAVENOUS CONTINUOUS
Status: DISCONTINUED | OUTPATIENT
Start: 2018-05-03 | End: 2018-05-03 | Stop reason: HOSPADM

## 2018-05-03 RX ORDER — PROPOFOL 10 MG/ML
VIAL (ML) INTRAVENOUS
Status: DISCONTINUED | OUTPATIENT
Start: 2018-05-03 | End: 2018-05-03

## 2018-05-03 RX ORDER — LIDOCAINE HCL/PF 100 MG/5ML
SYRINGE (ML) INTRAVENOUS
Status: DISCONTINUED | OUTPATIENT
Start: 2018-05-03 | End: 2018-05-03

## 2018-05-03 RX ADMIN — LIDOCAINE HYDROCHLORIDE 1 MG: 10 INJECTION, SOLUTION EPIDURAL; INFILTRATION; INTRACAUDAL; PERINEURAL at 07:05

## 2018-05-03 RX ADMIN — PROPOFOL 25 MG: 10 INJECTION, EMULSION INTRAVENOUS at 07:05

## 2018-05-03 RX ADMIN — PROPOFOL 75 MG: 10 INJECTION, EMULSION INTRAVENOUS at 07:05

## 2018-05-03 RX ADMIN — SODIUM CHLORIDE, SODIUM LACTATE, POTASSIUM CHLORIDE, CALCIUM CHLORIDE: 600; 310; 30; 20 INJECTION, SOLUTION INTRAVENOUS at 07:05

## 2018-05-03 RX ADMIN — LIDOCAINE HYDROCHLORIDE 100 MG: 20 INJECTION, SOLUTION INTRAVENOUS at 07:05

## 2018-05-03 NOTE — DISCHARGE INSTRUCTIONS
Recovery After Procedural Sedation (Adult)  You have been given medicine by vein to make you sleep during your surgery. This may have included both a pain medicine and sleeping medicine. Most of the effects have worn off. But you may still have some drowsiness for the next 6 to 8 hours.  Home care  Follow these guidelines when you get home:  · For the next 8 hours, you should be watched by a responsible adult. This person should make sure your condition is not getting worse.  · Don't drink any alcohol for the next 24 hours.  · Don't drive, operate dangerous machinery, or make important business or personal decisions during the next 24 hours.  Note: Your healthcare provider may tell you not to take any medicine by mouth for pain or sleep in the next 4 hours. These medicines may react with the medicines you were given in the hospital. This could cause a much stronger response than usual.  Follow-up care  Follow up with your healthcare provider if you are not alert and back to your usual level of activity within 12 hours.  When to seek medical advice  Call your healthcare provider right away if any of these occur:  · Drowsiness gets worse  · Weakness or dizziness gets worse  · Repeated vomiting  · You can't be awakened   Date Last Reviewed: 10/18/2016  © 9548-9737 [employer]. [address] [place] [address], [place] [address]. All rights reserved. This information is not intended as a substitute for professional medical care. Always follow your healthcare professional's instructions.        Understanding Colon and Rectal Polyps    The colon (also called the large intestine) is a muscular tube that forms the last part of the digestive tract. It absorbs water and stores food waste. The colon is about 4 to 6 feet long. The rectum is the last 6 inches of the colon. The colon and rectum have a smooth lining composed of millions of cells. Changes in these cells can lead to growths in the colon that can become  cancerous and should be removed. Multiple tests are available to screen for colon cancer, but the colonoscopy is the most recommended test. During colonoscopy, these polyps can be removed. How often you need this test depends on many things including your condition, your family history, symptoms, and what the findings were at the previous colonoscopy.   When the colon lining changes  Changes that happen in the cells that line the colon or rectum can lead to growths called polyps. Over a period of years, polyps can turn cancerous. Removing polyps early may prevent cancer from ever forming.  Polyps  Polyps are fleshy clumps of tissue that form on the lining of the colon or rectum. Small polyps are usually benign (not cancerous). However, over time, cells in a polyp can change and become cancerous. Certain types of polyps known as adenomatous polyps are premalignant. The risk for invasive cancer increases with the size of the polyp and certain cell and gene features. This means that they can become cancerous if they're not removed. Hyperplastic polyps are benign. They can grow quite large and not turn cancerous.   Cancer  Almost all colorectal cancers start when polyp cells begin growing abnormally. As a cancerous tumor grows, it may involve more and more of the colon or rectum. In time, cancer can also grow beyond the colon or rectum and spread to nearby organs or to glands called lymph nodes. The cells can also travel to other parts of the body. This is known as metastasis. The earlier a cancerous tumor is removed, the better the chance of preventing its spread.    Date Last Reviewed: 8/1/2016  © 6509-9855 [employer]. [address] [place] [address], [place] [address]. All rights reserved. This information is not intended as a substitute for professional medical care. Always follow your healthcare professional's instructions.

## 2018-05-03 NOTE — ANESTHESIA POSTPROCEDURE EVALUATION
Anesthesia Post Evaluation    Patient: Fabian Shine    Procedure(s) Performed: Procedure(s) (LRB):  SIGMOIDOSCOPY-FLEXIBLE (N/A)    Final Anesthesia Type: general  Patient location during evaluation: PACU  Patient participation: Yes- Able to Participate  Level of consciousness: awake and alert and oriented  Post-procedure vital signs: reviewed and stable  Pain management: adequate  Airway patency: patent  PONV status at discharge: No PONV  Anesthetic complications: no      Cardiovascular status: hemodynamically stable and blood pressure returned to baseline  Respiratory status: unassisted, spontaneous ventilation and room air  Hydration status: euvolemic  Follow-up not needed.        Visit Vitals  BP (!) 143/80 (BP Location: Left arm, Patient Position: Lying)   Pulse 88   Temp 36.4 °C (97.6 °F) (Skin)   Resp 16   Ht 6' (1.829 m)   Wt 79.8 kg (176 lb)   SpO2 98%   BMI 23.87 kg/m²       Pain/Benjamin Score: Pain Assessment Performed: Yes (5/3/2018  6:54 AM)  Presence of Pain: non-verbal indicators absent (5/3/2018  7:57 AM)  Benjamin Score: 6 (5/3/2018  7:57 AM)

## 2018-05-03 NOTE — ANESTHESIA PREPROCEDURE EVALUATION
05/03/2018  Fabian Shine is a 86 y.o., male.    Anesthesia Evaluation      I have reviewed the Medications.     Review of Systems  Anesthesia Hx:  No problems with previous Anesthesia   Social:  Non-Smoker, No Alcohol Use    Cardiovascular:   Hypertension Dysrhythmias atrial fibrillation CHF PVD (left LE angioplasty 8/17) hyperlipidemia    Pulmonary:  Pulmonary Normal    Renal/:  Renal/ Normal     Hepatic/GI:  Hepatic/GI Normal    Neurological:  Neurology Normal RLS   Endocrine:  Endocrine Normal        Physical Exam  General:  Well nourished    Airway/Jaw/Neck:  Airway Findings: Mouth Opening: Normal General Airway Assessment: Adult  Mallampati: II  Jaw/Neck Findings:  Neck ROM: Extension Decreased, Mild       Chest/Lungs:  Chest/Lungs Findings: Clear to auscultation, Normal Respiratory Rate     Heart/Vascular:  Heart Findings: Rate: Normal  Rhythm: Irregularly Irregular  Sounds: Normal  Heart murmur: negative Vascular Findings: Normal (No carotid bruits.)       Mental Status:  Mental Status Findings:  Cooperative, Alert and Oriented         Anesthesia Plan  Type of Anesthesia, risks & benefits discussed:  Anesthesia Type:  general  Patient's Preference:   Intra-op Monitoring Plan:   Intra-op Monitoring Plan Comments:   Post Op Pain Control Plan:   Post Op Pain Control Plan Comments:   Induction:   IV  Beta Blocker:  Patient is not currently on a Beta-Blocker (No further documentation required).       Informed Consent: Patient understands risks and agrees with Anesthesia plan.  Questions answered. Anesthesia consent signed with patient.  ASA Score: 3     Day of Surgery Review of History & Physical:        Anesthesia Plan Notes: Propofol general.        Ready For Surgery From Anesthesia Perspective.

## 2018-05-03 NOTE — PROVATION PATIENT INSTRUCTIONS
Discharge Summary/Instructions after an Endoscopic Procedure  Patient Name: Fabian Shine  Patient MRN: [id number]  Patient YOB: 1932  Thursday, May 03, 2018  Willis Huang MD  RESTRICTIONS:  During your procedure today, you received medications for sedation.  These   medications may affect your judgment, balance and coordination.  Therefore,   for 24 hours, you have the following restrictions:   - DO NOT drive a car, operate machinery, make legal/financial decisions,   sign important papers or drink alcohol.    ACTIVITY:  The following day: return to full activity including work, except no heavy   lifting, straining or running for 3 days if polyps were removed.  DIET:  Eat and drink normally unless instructed otherwise.     TREATMENT FOR COMMON SIDE EFFECTS:  - Mild abdominal pain, nausea, belching, bloating or excessive gas:  rest,   eat lightly and use a heating pad.  - Sore Throat: treat with throat lozenges and/or gargle with warm salt   water.  - Because air was used during the procedure, expelling large amounts of air   from your rectum or belching is normal.  - If a bowel prep was taken, you may not have a bowel movement for 1-3 days.    This is normal.  SYMPTOMS TO WATCH FOR AND REPORT TO YOUR PHYSICIAN:  1. Abdominal pain or bloating, other than gas cramps.  2. Chest pain.  3. Back pain.  4. Signs of infection such as: chills or fever occurring within 24 hours   after the procedure.  5. Rectal bleeding, which would show as bright red, maroon, or black stools.   (A tablespoon of blood from the rectum is not serious, especially if   hemorrhoids are present.)  6. Vomiting.  7. Weakness or dizziness.  GO DIRECTLY TO THE NEAREST EMERGENCY ROOM IF YOU HAVE ANY OF THE FOLLOWING:      Difficulty breathing              Chills and/or fever over 101 F   Persistent vomiting and/or vomiting blood   Severe abdominal pain   Severe chest pain   Black, tarry stools   Bleeding- more than one tablespoon   Any  other symptom or condition that you feel may need urgent attention  Your doctor recommends these additional instructions:  If any biopsies were taken, your doctors clinic will contact you in 1 to 2   weeks with any results.  - Use fiber, for example Citrucel, Fibercon, Konsyl or Metamucil.   - Discharge patient to home (ambulatory).   - Return to my office as previously scheduled.  For questions, problems or results please call your physician - Willis Huang MD at Work:  [phone].  EMERGENCY PHONE NUMBER: [phone], LAB RESULTS: [phone]  IF A COMPLICATION OR EMERGENCY SITUATION ARISES AND YOU ARE UNABLE TO REACH   YOUR PHYSICIAN - GO DIRECTLY TO THE EMERGENCY ROOM.  ___________________________________________  Nurse Signature  ___________________________________________  Patient/Designated Responsible Party Signature  Willis Huang MD  5/3/2018 7:55:56 AM  This report has been verified and signed electronically.

## 2018-05-03 NOTE — H&P (VIEW-ONLY)
Subjective:       Patient ID: Fabian Shine is a 86 y.o. male.    Chief Complaint: Follow-up    HPI  Pleasant 87 yo M whom I  Have seen in the past for hemorrhoids.  Pt had never had a colonoscopy and at time of my visit was not interested.  Pt notes that he had progressive changes in bowel habits leading to him having a colonoscopy with Dr Redmond which demonstrated a mass in mid to upper rectum between 2nd and third valve of Enders.  This was estimated to be 12 cm from anal verge.  Pathology unfortunately confirmed adencarcinoma.  PT denies pain. No changes in appetite or in weight.  Pt presents to discuss mgmt.  Pt is week and reliant on wheel chair for trasnportation.  He suffers from PAD withstent placment and afib.    Review of Systems   Constitutional: Negative for activity change, appetite change, diaphoresis, fever and unexpected weight change.   HENT: Negative for congestion and hearing loss.    Respiratory: Negative for cough, chest tightness and wheezing.    Cardiovascular: Negative for chest pain and palpitations.   Gastrointestinal: Positive for anal bleeding and diarrhea. Negative for abdominal distention, abdominal pain, blood in stool, constipation, nausea, rectal pain and vomiting.   Genitourinary: Negative for difficulty urinating, dysuria and hematuria.   Musculoskeletal: Negative for back pain and gait problem.   Skin: Negative for color change and wound.   Neurological: Negative for tremors and seizures.   Hematological: Negative for adenopathy. Does not bruise/bleed easily.   Psychiatric/Behavioral: Negative for agitation and decreased concentration.       Objective:      Physical Exam   Constitutional: He is oriented to person, place, and time. He appears well-developed and well-nourished.   HENT:   Head: Normocephalic and atraumatic.   Eyes: Pupils are equal, round, and reactive to light. Right eye exhibits no discharge. Left eye exhibits no discharge. No scleral icterus.   Neck: Normal  range of motion. No tracheal deviation present. No thyromegaly present.   Cardiovascular: Normal rate, regular rhythm and normal heart sounds.  Exam reveals no gallop and no friction rub.    No murmur heard.  Pulmonary/Chest: Effort normal and breath sounds normal. He has no wheezes. He exhibits no tenderness.   Abdominal: He exhibits no distension and no mass. There is no tenderness. There is no rebound and no guarding. No hernia.   Genitourinary:   Genitourinary Comments: Ext exam some slight ext hemorrhoids noted.  BELTRAN with no mass palpated.     Musculoskeletal: Normal range of motion.   Lymphadenopathy:     He has no cervical adenopathy.   Neurological: He is alert and oriented to person, place, and time. Coordination normal.   Skin: Skin is warm. No rash noted. No erythema. No pallor.   Psychiatric: He has a normal mood and affect. His behavior is normal.   Vitals reviewed.         Colonoscopy report and images reviewed.  Mass at 12 cm    Path; adenocarcinoma    Assessment:       1. Mass in rectum    2. Rectal cancer    3. Other specified diseases of anus and rectum        Plan:       Lengthy d/w pt and his wife.  I have explained to them that he has a rectal cancer in the proximal to mid rectum.  D/w them multiple treatment options.  Need to determine location of tumor in relation to the peritoneal reflection and also in regards to your circumferential radial margin.  This has been d/w Dr Redmond and agree that MRI will best give this information.  WIll plan a flex sig so I can also visualize proximal extent of tumor.  D/w pt that if lesion in proximal rectum and CRM appears intact would likely proceed with surgery.  Given frail nature of pt do not feel that he would tolerate radiation treatment well.  Pt agrees to this plan.  Will also obtain a ct scan of chest/abd/pelvis to evaluate for any metastatic disease.

## 2018-05-03 NOTE — INTERVAL H&P NOTE
The patient has been examined and the H&P has been reviewed:    I concur with the findings and no changes have occurred since H&P was written.    Anesthesia/Surgery risks, benefits and alternative options discussed and understood by patient/family.          Active Hospital Problems    Diagnosis  POA    Mass in rectum [K62.9]  Yes      Resolved Hospital Problems    Diagnosis Date Resolved POA   No resolved problems to display.

## 2018-05-03 NOTE — TRANSFER OF CARE
Anesthesia Transfer of Care Note    Patient: Fabian Shine    Procedure(s) Performed: Procedure(s) (LRB):  SIGMOIDOSCOPY-FLEXIBLE (N/A)    Patient location: PACU    Anesthesia Type: general    Transport from OR: Transported from OR on room air with adequate spontaneous ventilation    Post pain: adequate analgesia    Post assessment: no apparent anesthetic complications    Post vital signs: stable    Level of consciousness: awake    Nausea/Vomiting: no nausea/vomiting    Complications: none    Transfer of care protocol was followed      Last vitals:   Visit Vitals  BP (!) 141/63 (BP Location: Left arm, Patient Position: Lying)   Pulse 83   Temp 36.3 °C (97.3 °F) (Skin)   Resp 16   Ht 6' (1.829 m)   Wt 79.8 kg (176 lb)   SpO2 98%   BMI 23.87 kg/m²

## 2018-05-08 ENCOUNTER — TELEPHONE (OUTPATIENT)
Dept: SURGERY | Facility: CLINIC | Age: 83
End: 2018-05-08

## 2018-05-09 ENCOUNTER — TELEPHONE (OUTPATIENT)
Dept: SURGERY | Facility: CLINIC | Age: 83
End: 2018-05-09

## 2018-05-09 NOTE — TELEPHONE ENCOUNTER
I called and spoke to patients wife and scheduled him per Dr. Huang on Friday, 5/11/18 at 9:00am in [place].  Ellis

## 2018-05-09 NOTE — TELEPHONE ENCOUNTER
----- Message from Geovanny Mahmood LPN sent at 5/9/2018  9:58 AM CDT -----      ----- Message -----  From: Willis Huang MD  Sent: 5/8/2018   4:43 PM  To: Geovanny Mahmood LPN    Can you arrange for clinic sometime this week  ----- Message -----  From: Geovanny Mahmood LPN  Sent: 5/8/2018   3:07 PM  To: Willis Huang MD

## 2018-05-11 ENCOUNTER — TELEPHONE (OUTPATIENT)
Dept: SURGERY | Facility: CLINIC | Age: 83
End: 2018-05-11

## 2018-05-11 ENCOUNTER — OFFICE VISIT (OUTPATIENT)
Dept: SURGERY | Facility: CLINIC | Age: 83
End: 2018-05-11
Payer: MEDICARE

## 2018-05-11 VITALS
DIASTOLIC BLOOD PRESSURE: 65 MMHG | WEIGHT: 176 LBS | SYSTOLIC BLOOD PRESSURE: 132 MMHG | BODY MASS INDEX: 23.84 KG/M2 | HEIGHT: 72 IN | HEART RATE: 83 BPM

## 2018-05-11 DIAGNOSIS — C20 RECTAL CANCER: Primary | ICD-10-CM

## 2018-05-11 PROCEDURE — 99999 PR PBB SHADOW E&M-EST. PATIENT-LVL III: CPT | Mod: PBBFAC,,, | Performed by: SURGERY

## 2018-05-11 PROCEDURE — 99212 OFFICE O/P EST SF 10 MIN: CPT | Mod: S$PBB,,, | Performed by: SURGERY

## 2018-05-11 PROCEDURE — 99213 OFFICE O/P EST LOW 20 MIN: CPT | Mod: PBBFAC,PO | Performed by: SURGERY

## 2018-05-11 RX ORDER — SODIUM CHLORIDE 9 MG/ML
INJECTION, SOLUTION INTRAVENOUS CONTINUOUS
Status: CANCELLED | OUTPATIENT
Start: 2018-05-11

## 2018-05-11 RX ORDER — METRONIDAZOLE 500 MG/100ML
500 INJECTION, SOLUTION INTRAVENOUS
Status: CANCELLED | OUTPATIENT
Start: 2018-05-11

## 2018-05-11 NOTE — PATIENT INSTRUCTIONS
Surgery is scheduled for 05/28/18 arrival time per the preop nurse.  Preop will call from [phone]  Fasting after midnight  Someone to drive you home    PLEASE NOTE THAT SURGERY TIMES CAN CHANGE THE PREOP NURSE WILL GIVE THE ARRIVAL TIME WHEN SHE/HE CALLS.  THE PREOP NURSE WILL CALL, SOMETIMES AS LATE AS 4 PM IN THE AFTERNOON THE DAY BEFORE SURGERY.    Bathe the night before and the morning of your procedure with a Chlorhexidine wash such as Hibiclens, can be purchased at most Pharmacy's.    Special Instruction: Bowel Prep  Preop Visit for Marking

## 2018-05-11 NOTE — TELEPHONE ENCOUNTER
----- Message from Laura Doshi sent at 5/11/2018  3:10 PM CDT -----  Contact: Wife Kassy  Patients wife states that the instructions paperwork Geovanny gave her this morning show that the patient is having surgery on 4/28 and he is having surgery of course on 5/28.  Please make sure this is corrected in the system and check all other dates.   Thanks

## 2018-05-11 NOTE — PROGRESS NOTES
Subjective:       Patient ID: Fabian Shine is a 86 y.o. male.    Chief Complaint: Follow-up (F/U MRI results)    HPI  Pleasant 85 yo M whom I am familiar with. Pt with recent diagnosis of mid to proximal rectal cancer.  Pt returns following MRI to discuss surgical options.  Pt suffers with PAD for which he had stents placed [id number].  He is wheel chair bound secondary to complications from his PAD.  No history of heart disease and no significant PSHx.   Review of Systems   Constitutional: Positive for activity change. Negative for appetite change, diaphoresis, fever and unexpected weight change.   HENT: Negative for congestion and dental problem.    Respiratory: Negative for cough, chest tightness and wheezing.    Cardiovascular: Negative for chest pain and palpitations.   Gastrointestinal: Positive for anal bleeding and blood in stool. Negative for abdominal distention, abdominal pain, constipation, diarrhea, nausea, rectal pain and vomiting.   Genitourinary: Positive for difficulty urinating. Negative for dysuria and hematuria.   Musculoskeletal: Negative for back pain and gait problem.   Skin: Negative for color change and pallor.   Neurological: Negative for tremors and seizures.   Hematological: Negative for adenopathy. Does not bruise/bleed easily.   Psychiatric/Behavioral: Negative for agitation and decreased concentration.       Objective:      Physical Exam   Constitutional: He is oriented to person, place, and time. He appears well-developed and well-nourished.   HENT:   Head: Normocephalic and atraumatic.   Eyes: Pupils are equal, round, and reactive to light.   Neck: Normal range of motion. No tracheal deviation present. No thyromegaly present.   Cardiovascular: Normal rate and regular rhythm.  Exam reveals no gallop and no friction rub.    Pulmonary/Chest: Effort normal and breath sounds normal. He has no wheezes. He exhibits no tenderness.   Abdominal: He exhibits no distension and no mass. There is no  tenderness. There is no rebound and no guarding.   Musculoskeletal: Normal range of motion. He exhibits no edema, tenderness or deformity.   Lymphadenopathy:     He has no cervical adenopathy.        Right: No supraclavicular adenopathy present.        Left: No supraclavicular adenopathy present.   Neurological: He is alert and oriented to person, place, and time. Coordination normal.   Skin: Skin is warm. No rash noted. No erythema.   Psychiatric: He has a normal mood and affect.   Vitals reviewed.        MRI reviewed.  No evidence of metastatic disease.  No involvment of mesorectum. No lymphadenopathy    Assessment:     REctal cancer  No diagnosis found.    Plan:       D/w pt.  I have recommended proceeding with surgery.  He agrees to this.  Have also recommended that from a quality of life standpoint he would probably be better served with colostomy as opposed to colorectal anastomosis.  Pt expressed understanding.  He desires to proceed with surgery with end colostomy

## 2018-05-16 ENCOUNTER — TELEPHONE (OUTPATIENT)
Dept: SURGERY | Facility: CLINIC | Age: 83
End: 2018-05-16

## 2018-05-16 NOTE — TELEPHONE ENCOUNTER
Spoke to wife, informed that I will notify doctor's Demario and Beata that we plan to hold Xarelto x 5days prior to surgery on 5/28/18. Patient has balloon in leg and Afib.

## 2018-05-16 NOTE — TELEPHONE ENCOUNTER
----- Message from Dhaval Sanford sent at 5/16/2018  9:02 AM CDT -----  Contact: wife Kassy   Wife Kassy want to speak with Geovanny regarding patient being off xarelto, please call back at [phone]

## 2018-05-18 ENCOUNTER — OFFICE VISIT (OUTPATIENT)
Dept: CARDIOLOGY | Facility: CLINIC | Age: 83
End: 2018-05-18
Payer: MEDICARE

## 2018-05-18 VITALS
DIASTOLIC BLOOD PRESSURE: 75 MMHG | BODY MASS INDEX: 23.84 KG/M2 | WEIGHT: 176 LBS | HEIGHT: 72 IN | SYSTOLIC BLOOD PRESSURE: 121 MMHG | HEART RATE: 86 BPM

## 2018-05-18 DIAGNOSIS — I73.9 PAD (PERIPHERAL ARTERY DISEASE): Primary | ICD-10-CM

## 2018-05-18 DIAGNOSIS — I48.20 CHRONIC ATRIAL FIBRILLATION: ICD-10-CM

## 2018-05-18 DIAGNOSIS — I73.9 PERIPHERAL VASCULAR DISEASE, UNSPECIFIED: ICD-10-CM

## 2018-05-18 DIAGNOSIS — Z01.810 PREOP CARDIOVASCULAR EXAM: ICD-10-CM

## 2018-05-18 PROBLEM — I50.22 CHRONIC SYSTOLIC CONGESTIVE HEART FAILURE: Status: RESOLVED | Noted: 2017-06-26 | Resolved: 2018-05-18

## 2018-05-18 PROCEDURE — 99204 OFFICE O/P NEW MOD 45 MIN: CPT | Mod: S$PBB,,, | Performed by: INTERNAL MEDICINE

## 2018-05-18 PROCEDURE — 99999 PR PBB SHADOW E&M-EST. PATIENT-LVL II: CPT | Mod: PBBFAC,,, | Performed by: INTERNAL MEDICINE

## 2018-05-18 PROCEDURE — 99212 OFFICE O/P EST SF 10 MIN: CPT | Mod: PBBFAC,PO | Performed by: INTERNAL MEDICINE

## 2018-05-18 NOTE — LETTER
May 18, 2018      Willis Huang MD  [address] [facility] [address] [place] [address]           [facility]  [address] [facility] [address] [place] [address]  Phone: [phone]          Patient: Fabian Shine   MR Number: [id number]   YOB: 1932   Date of Visit: 5/18/2018       Dear Dr. Willis Haung:    Thank you for referring Fabian Shine to me for evaluation. Attached you will find relevant portions of my assessment and plan of care.    If you have questions, please do not hesitate to call me. I look forward to following Fabian Shine along with you.    Sincerely,    Noble Arenas Jr., MD    Enclosure  CC:  No Recipients    If you would like to receive this communication electronically, please contact [email or web address] or [phone] to request more information on [email or web address] Link access.    For providers and/or their staff who would like to refer a patient to [facility], please contact us through our one-stop-shop provider referral line, [facility] , at [phone].    If you feel you have received this communication in error or would no longer like to receive these types of communications, please e-mail [email or web address]

## 2018-05-18 NOTE — PROGRESS NOTES
Subjective:    Patient ID:  Fabian Shine is a 86 y.o. male who presents for evaluation of new pt (new pt) and clearance for surg.      HPI86 yo WM with PVD and chronic AF. He is scheduled for colon surgery next week. No hx of MI or CHF. Denies CP. Had cardiac cath several years ago that was negative. Followed by Dr Gallardo for PVD    Review of Systems   Constitution: Negative for decreased appetite, fever, weakness, malaise/fatigue, weight gain and weight loss.   HENT: Negative for hearing loss and nosebleeds.    Eyes: Negative for visual disturbance.   Cardiovascular: Positive for claudication and leg swelling. Negative for chest pain, cyanosis, dyspnea on exertion, irregular heartbeat, near-syncope, orthopnea, palpitations, paroxysmal nocturnal dyspnea and syncope.   Respiratory: Negative for cough, hemoptysis, shortness of breath, sleep disturbances due to breathing, snoring and wheezing.    Endocrine: Negative for cold intolerance, heat intolerance, polydipsia and polyuria.   Hematologic/Lymphatic: Negative for adenopathy and bleeding problem. Does not bruise/bleed easily.   Skin: Negative for color change, itching, poor wound healing, rash and suspicious lesions.   Musculoskeletal: Positive for arthritis, joint pain and joint swelling. Negative for back pain, falls, muscle cramps, muscle weakness and myalgias.   Gastrointestinal: Negative for bloating, abdominal pain, change in bowel habit, constipation, flatus, heartburn, hematemesis, hematochezia, hemorrhoids, jaundice, melena, nausea and vomiting.   Genitourinary: Negative for bladder incontinence, decreased libido, frequency, hematuria, hesitancy and urgency.   Neurological: Negative for brief paralysis, difficulty with concentration, excessive daytime sleepiness, dizziness, focal weakness, headaches, light-headedness, loss of balance, numbness and vertigo.   Psychiatric/Behavioral: Negative for altered mental status, depression and memory loss. The  patient does not have insomnia and is not nervous/anxious.    Allergic/Immunologic: Negative for environmental allergies, hives and persistent infections.        Objective:    Physical Exam   Constitutional: He is oriented to person, place, and time. He appears well-developed and well-nourished. No distress.   /75 (BP Location: Left arm, Patient Position: Sitting, BP Method: Medium (Automatic))   Pulse 86   Ht 6' (1.829 m)   Wt 79.8 kg (176 lb)   BMI 23.87 kg/m²      HENT:   Head: Normocephalic and atraumatic.   Eyes: Conjunctivae and lids are normal. Pupils are equal, round, and reactive to light. Right eye exhibits no discharge. No scleral icterus.   Neck: Normal range of motion. Neck supple. No JVD present. No tracheal deviation present. No thyromegaly present.   Cardiovascular: Normal rate, S1 normal, S2 normal, normal heart sounds and intact distal pulses.  An irregularly irregular rhythm present. Exam reveals decreased pulses. Exam reveals no gallop and no friction rub.    No murmur heard.  Pulses:       Carotid pulses are 2+ on the right side, and 2+ on the left side.       Radial pulses are 2+ on the right side, and 2+ on the left side.        Femoral pulses are 2+ on the right side, and 2+ on the left side.       Popliteal pulses are 2+ on the right side, and 2+ on the left side.        Dorsalis pedis pulses are 1+ on the right side, and 1+ on the left side.        Posterior tibial pulses are 1+ on the right side, and 1+ on the left side.   Pulmonary/Chest: Effort normal and breath sounds normal. No respiratory distress. He has no wheezes. He has no rales. He exhibits no tenderness.   Abdominal: Soft. Bowel sounds are normal. He exhibits no distension and no mass. There is no hepatosplenomegaly or hepatomegaly. There is no tenderness. There is no rebound and no guarding.   Musculoskeletal: Normal range of motion. He exhibits edema. He exhibits no tenderness.   Has some swelling in right leg from  previous fall.   Lymphadenopathy:     He has no cervical adenopathy.   Neurological: He is alert and oriented to person, place, and time. He has normal reflexes. No cranial nerve deficit. Coordination normal.   Skin: Skin is warm and dry. No rash noted. He is not diaphoretic. No erythema. No pallor.   Psychiatric: He has a normal mood and affect. His speech is normal and behavior is normal. Judgment and thought content normal. Cognition and memory are normal.         Assessment:       1. PAD (peripheral artery disease)    2. Preop cardiovascular exam    3. Peripheral vascular disease, unspecified    4. Chronic atrial fibrillation         Plan:     There are no absolute contraindications to surgery from cardiac standpoint and would use routine precautions. No further cardiac testing required prior to surgery.   Moderate risk based on age and chronic arrhythmia as well as PVD    May stop xarelto 3 days prior to surgery and restart when OK with surgeon   No orders of the defined types were placed in this encounter.    Follow-up in about 3 months (around 8/18/2018).

## 2018-05-24 ENCOUNTER — HOSPITAL ENCOUNTER (OUTPATIENT)
Dept: RADIOLOGY | Facility: HOSPITAL | Age: 83
Discharge: HOME | End: 2018-05-24
Attending: SURGERY
Payer: MEDICARE

## 2018-05-24 ENCOUNTER — HOSPITAL ENCOUNTER (OUTPATIENT)
Dept: PREADMISSION TESTING | Facility: HOSPITAL | Age: 83
Discharge: HOME | End: 2018-05-24
Attending: SURGERY
Payer: MEDICARE

## 2018-05-24 VITALS — HEIGHT: 72 IN | BODY MASS INDEX: 23.84 KG/M2 | WEIGHT: 176 LBS

## 2018-05-24 DIAGNOSIS — C20 RECTAL CANCER: ICD-10-CM

## 2018-05-24 LAB
ABO + RH BLD: NORMAL
ALBUMIN SERPL BCP-MCNC: 3.7 G/DL
ALP SERPL-CCNC: 59 U/L
ALT SERPL W/O P-5'-P-CCNC: 15 U/L
ANION GAP SERPL CALC-SCNC: 9 MMOL/L
AST SERPL-CCNC: 19 U/L
BASOPHILS # BLD AUTO: 0 K/UL
BASOPHILS NFR BLD: 0.2 %
BILIRUB SERPL-MCNC: 0.5 MG/DL
BLD GP AB SCN CELLS X3 SERPL QL: NORMAL
BUN SERPL-MCNC: 19 MG/DL
CALCIUM SERPL-MCNC: 10.1 MG/DL
CHLORIDE SERPL-SCNC: 105 MMOL/L
CO2 SERPL-SCNC: 27 MMOL/L
CREAT SERPL-MCNC: 1 MG/DL
DIFFERENTIAL METHOD: ABNORMAL
EOSINOPHIL # BLD AUTO: 0.3 K/UL
EOSINOPHIL NFR BLD: 3.4 %
ERYTHROCYTE [DISTWIDTH] IN BLOOD BY AUTOMATED COUNT: 14.2 %
EST. GFR  (AFRICAN AMERICAN): >60 ML/MIN/1.73 M^2
EST. GFR  (NON AFRICAN AMERICAN): >60 ML/MIN/1.73 M^2
GLUCOSE SERPL-MCNC: 126 MG/DL
HCT VFR BLD AUTO: 44.8 %
HGB BLD-MCNC: 15.4 G/DL
LYMPHOCYTES # BLD AUTO: 1.6 K/UL
LYMPHOCYTES NFR BLD: 19.4 %
MCH RBC QN AUTO: 33 PG
MCHC RBC AUTO-ENTMCNC: 34.2 G/DL
MCV RBC AUTO: 96 FL
MONOCYTES # BLD AUTO: 0.9 K/UL
MONOCYTES NFR BLD: 11.5 %
NEUTROPHILS # BLD AUTO: 5.4 K/UL
NEUTROPHILS NFR BLD: 65.5 %
PLATELET # BLD AUTO: 174 K/UL
PMV BLD AUTO: 7.3 FL
POTASSIUM SERPL-SCNC: 4.4 MMOL/L
PROT SERPL-MCNC: 6.5 G/DL
RBC # BLD AUTO: 4.66 M/UL
SODIUM SERPL-SCNC: 141 MMOL/L
WBC # BLD AUTO: 8.2 K/UL

## 2018-05-24 PROCEDURE — 93010 ELECTROCARDIOGRAM REPORT: CPT | Mod: ,,, | Performed by: INTERNAL MEDICINE

## 2018-05-24 PROCEDURE — 71046 X-RAY EXAM CHEST 2 VIEWS: CPT | Mod: TC,FY

## 2018-05-24 PROCEDURE — 85025 COMPLETE CBC W/AUTO DIFF WBC: CPT

## 2018-05-24 PROCEDURE — 99900103 DSU ONLY-NO CHARGE-INITIAL HR (STAT)

## 2018-05-24 PROCEDURE — 99900104 DSU ONLY-NO CHARGE-EA ADD'L HR (STAT)

## 2018-05-24 PROCEDURE — 71046 X-RAY EXAM CHEST 2 VIEWS: CPT | Mod: 26,,, | Performed by: RADIOLOGY

## 2018-05-24 PROCEDURE — 86901 BLOOD TYPING SEROLOGIC RH(D): CPT

## 2018-05-24 PROCEDURE — 93005 ELECTROCARDIOGRAM TRACING: CPT

## 2018-05-24 PROCEDURE — 80053 COMPREHEN METABOLIC PANEL: CPT

## 2018-05-24 NOTE — NURSING
"Marked patient for an end colostomy due to rectal cancer. Wife present, patient is wheel chair bound. Very pleasant, but nervous.    Marked patient in two areas. Very lower left abdomen, with a black "X" and another area in the the left upper abdomen with a black [place].    Patient reported he has recently lost a lot of weight, which is the reason he has numerous folds and creases in the abdominal area. It was difficult to find an area that was flat and in the rectus muscle. His wife reported she will be doing the pouch changes for her , as she takes care of all of his personal needs.    Explained and demonstrated how a colostomy is created, how to measure a stoma, cut the opening for a pouch, apply pouch, how and when to empty and change the pouch, and gave the patient a [employer] Home Skills kit. Patient and wife had numerous questions, that were answered to their satisfaction.     Patient to have surgery this Monday, May 28, 2018, with Dr. Huang.    Wilma Harper RN, CWOCN  "

## 2018-05-25 ENCOUNTER — ANESTHESIA EVENT (OUTPATIENT)
Dept: SURGERY | Facility: HOSPITAL | Age: 83
DRG: 330 | End: 2018-05-25
Payer: MEDICARE

## 2018-05-28 ENCOUNTER — HOSPITAL ENCOUNTER (INPATIENT)
Facility: HOSPITAL | Age: 83
LOS: 4 days | Discharge: NURSING, REHAB OR LONG-TERM CARE | DRG: 330 | End: 2018-06-01
Attending: SURGERY | Admitting: SURGERY
Payer: MEDICARE

## 2018-05-28 ENCOUNTER — ANESTHESIA (OUTPATIENT)
Dept: SURGERY | Facility: HOSPITAL | Age: 83
DRG: 330 | End: 2018-05-28
Payer: MEDICARE

## 2018-05-28 DIAGNOSIS — C20 RECTAL CANCER: ICD-10-CM

## 2018-05-28 DIAGNOSIS — I48.20 CHRONIC ATRIAL FIBRILLATION: ICD-10-CM

## 2018-05-28 DIAGNOSIS — Z86.2 HISTORY OF IRON DEFICIENCY ANEMIA: ICD-10-CM

## 2018-05-28 DIAGNOSIS — N13.8 BENIGN PROSTATIC HYPERPLASIA WITH URINARY OBSTRUCTION: Primary | ICD-10-CM

## 2018-05-28 DIAGNOSIS — N40.1 BENIGN PROSTATIC HYPERPLASIA WITH URINARY OBSTRUCTION: Primary | ICD-10-CM

## 2018-05-28 DIAGNOSIS — G25.81 RESTLESS LEG SYNDROME, CONTROLLED: ICD-10-CM

## 2018-05-28 PROCEDURE — S0030 INJECTION, METRONIDAZOLE: HCPCS | Performed by: SURGERY

## 2018-05-28 PROCEDURE — 63600175 PHARM REV CODE 636 W HCPCS: Performed by: NURSE ANESTHETIST, CERTIFIED REGISTERED

## 2018-05-28 PROCEDURE — 99900104 DSU ONLY-NO CHARGE-EA ADD'L HR (STAT): Performed by: SURGERY

## 2018-05-28 PROCEDURE — 37000008 HC ANESTHESIA 1ST 15 MINUTES: Performed by: SURGERY

## 2018-05-28 PROCEDURE — 49587 PR REPAIR UMBILICAL HERN,5+Y/O,STRANG: CPT | Mod: 51,,, | Performed by: SURGERY

## 2018-05-28 PROCEDURE — C9290 INJ, BUPIVACAINE LIPOSOME: HCPCS | Performed by: SURGERY

## 2018-05-28 PROCEDURE — 99900103 DSU ONLY-NO CHARGE-INITIAL HR (STAT): Performed by: SURGERY

## 2018-05-28 PROCEDURE — 88309 TISSUE EXAM BY PATHOLOGIST: CPT | Mod: 26,,,

## 2018-05-28 PROCEDURE — D9220A PRA ANESTHESIA: Mod: ANES,,, | Performed by: ANESTHESIOLOGY

## 2018-05-28 PROCEDURE — 25000003 PHARM REV CODE 250: Performed by: ANESTHESIOLOGY

## 2018-05-28 PROCEDURE — 71000039 HC RECOVERY, EACH ADD'L HOUR: Performed by: SURGERY

## 2018-05-28 PROCEDURE — 71000033 HC RECOVERY, INTIAL HOUR: Performed by: SURGERY

## 2018-05-28 PROCEDURE — 25000003 PHARM REV CODE 250: Performed by: SURGERY

## 2018-05-28 PROCEDURE — 0D1M0Z4 BYPASS DESCENDING COLON TO CUTANEOUS, OPEN APPROACH: ICD-10-PCS | Performed by: SURGERY

## 2018-05-28 PROCEDURE — 0DBN0ZZ EXCISION OF SIGMOID COLON, OPEN APPROACH: ICD-10-PCS | Performed by: SURGERY

## 2018-05-28 PROCEDURE — 3E0M05Z INTRODUCTION OF ADHESION BARRIER INTO PERITONEAL CAVITY, OPEN APPROACH: ICD-10-PCS | Performed by: SURGERY

## 2018-05-28 PROCEDURE — C1765 ADHESION BARRIER: HCPCS | Performed by: SURGERY

## 2018-05-28 PROCEDURE — 36000709 HC OR TIME LEV III EA ADD 15 MIN: Performed by: SURGERY

## 2018-05-28 PROCEDURE — 27201423 OPTIME MED/SURG SUP & DEVICES STERILE SUPPLY: Performed by: SURGERY

## 2018-05-28 PROCEDURE — 94761 N-INVAS EAR/PLS OXIMETRY MLT: CPT

## 2018-05-28 PROCEDURE — 99222 1ST HOSP IP/OBS MODERATE 55: CPT | Mod: ,,, | Performed by: INTERNAL MEDICINE

## 2018-05-28 PROCEDURE — 45111 PARTIAL REMOVAL OF RECTUM: CPT | Mod: 51,,, | Performed by: SURGERY

## 2018-05-28 PROCEDURE — D9220A PRA ANESTHESIA: Mod: CRNA,,, | Performed by: NURSE ANESTHETIST, CERTIFIED REGISTERED

## 2018-05-28 PROCEDURE — 12000002 HC ACUTE/MED SURGE SEMI-PRIVATE ROOM

## 2018-05-28 PROCEDURE — 88309 TISSUE EXAM BY PATHOLOGIST: CPT

## 2018-05-28 PROCEDURE — 37000009 HC ANESTHESIA EA ADD 15 MINS: Performed by: SURGERY

## 2018-05-28 PROCEDURE — 0DBP0ZZ EXCISION OF RECTUM, OPEN APPROACH: ICD-10-PCS | Performed by: SURGERY

## 2018-05-28 PROCEDURE — 63600175 PHARM REV CODE 636 W HCPCS: Performed by: SURGERY

## 2018-05-28 PROCEDURE — 36000708 HC OR TIME LEV III 1ST 15 MIN: Performed by: SURGERY

## 2018-05-28 PROCEDURE — 25000003 PHARM REV CODE 250: Performed by: NURSE PRACTITIONER

## 2018-05-28 PROCEDURE — 0WQF0ZZ REPAIR ABDOMINAL WALL, OPEN APPROACH: ICD-10-PCS | Performed by: SURGERY

## 2018-05-28 PROCEDURE — 25000003 PHARM REV CODE 250: Performed by: NURSE ANESTHETIST, CERTIFIED REGISTERED

## 2018-05-28 PROCEDURE — 63600175 PHARM REV CODE 636 W HCPCS: Performed by: ANESTHESIOLOGY

## 2018-05-28 PROCEDURE — 44320 COLOSTOMY: CPT | Mod: ,,, | Performed by: SURGERY

## 2018-05-28 PROCEDURE — 94799 UNLISTED PULMONARY SVC/PX: CPT

## 2018-05-28 DEVICE — MEMBRANE SEPRAFILM 5 X 6: Type: IMPLANTABLE DEVICE | Site: ABDOMEN | Status: FUNCTIONAL

## 2018-05-28 RX ORDER — DEXAMETHASONE SODIUM PHOSPHATE 4 MG/ML
INJECTION, SOLUTION INTRA-ARTICULAR; INTRALESIONAL; INTRAMUSCULAR; INTRAVENOUS; SOFT TISSUE
Status: DISCONTINUED | OUTPATIENT
Start: 2018-05-28 | End: 2018-05-28

## 2018-05-28 RX ORDER — PANTOPRAZOLE SODIUM 40 MG/10ML
40 INJECTION, POWDER, LYOPHILIZED, FOR SOLUTION INTRAVENOUS
Status: DISCONTINUED | OUTPATIENT
Start: 2018-05-29 | End: 2018-06-01 | Stop reason: HOSPADM

## 2018-05-28 RX ORDER — ACETAMINOPHEN 10 MG/ML
INJECTION, SOLUTION INTRAVENOUS
Status: DISCONTINUED | OUTPATIENT
Start: 2018-05-28 | End: 2018-05-28

## 2018-05-28 RX ORDER — SODIUM CHLORIDE, SODIUM LACTATE, POTASSIUM CHLORIDE, CALCIUM CHLORIDE 600; 310; 30; 20 MG/100ML; MG/100ML; MG/100ML; MG/100ML
INJECTION, SOLUTION INTRAVENOUS CONTINUOUS
Status: DISCONTINUED | OUTPATIENT
Start: 2018-05-28 | End: 2018-05-28

## 2018-05-28 RX ORDER — FENTANYL CITRATE 50 UG/ML
25 INJECTION, SOLUTION INTRAMUSCULAR; INTRAVENOUS EVERY 5 MIN PRN
Status: COMPLETED | OUTPATIENT
Start: 2018-05-28 | End: 2018-05-28

## 2018-05-28 RX ORDER — DIPHENHYDRAMINE HYDROCHLORIDE 50 MG/ML
12.5 INJECTION INTRAMUSCULAR; INTRAVENOUS EVERY 4 HOURS PRN
Status: DISCONTINUED | OUTPATIENT
Start: 2018-05-28 | End: 2018-06-01 | Stop reason: HOSPADM

## 2018-05-28 RX ORDER — VECURONIUM BROMIDE FOR INJECTION 1 MG/ML
INJECTION, POWDER, LYOPHILIZED, FOR SOLUTION INTRAVENOUS
Status: DISCONTINUED | OUTPATIENT
Start: 2018-05-28 | End: 2018-05-28

## 2018-05-28 RX ORDER — CEFAZOLIN SODIUM 2 G/50ML
2 SOLUTION INTRAVENOUS
Status: COMPLETED | OUTPATIENT
Start: 2018-05-28 | End: 2018-05-29

## 2018-05-28 RX ORDER — METOCLOPRAMIDE HYDROCHLORIDE 5 MG/ML
5 INJECTION INTRAMUSCULAR; INTRAVENOUS EVERY 6 HOURS PRN
Status: DISCONTINUED | OUTPATIENT
Start: 2018-05-28 | End: 2018-06-01

## 2018-05-28 RX ORDER — DIPHENHYDRAMINE HYDROCHLORIDE 50 MG/ML
25 INJECTION INTRAMUSCULAR; INTRAVENOUS EVERY 6 HOURS PRN
Status: DISCONTINUED | OUTPATIENT
Start: 2018-05-28 | End: 2018-05-28 | Stop reason: HOSPADM

## 2018-05-28 RX ORDER — SODIUM CHLORIDE 0.9 % (FLUSH) 0.9 %
3 SYRINGE (ML) INJECTION
Status: DISCONTINUED | OUTPATIENT
Start: 2018-05-28 | End: 2018-05-28 | Stop reason: HOSPADM

## 2018-05-28 RX ORDER — SODIUM CHLORIDE 9 MG/ML
INJECTION, SOLUTION INTRAVENOUS CONTINUOUS
Status: DISCONTINUED | OUTPATIENT
Start: 2018-05-28 | End: 2018-05-28

## 2018-05-28 RX ORDER — SODIUM CHLORIDE 0.9 % (FLUSH) 0.9 %
3 SYRINGE (ML) INJECTION EVERY 8 HOURS
Status: DISCONTINUED | OUTPATIENT
Start: 2018-05-28 | End: 2018-05-28 | Stop reason: HOSPADM

## 2018-05-28 RX ORDER — ONDANSETRON 2 MG/ML
4 INJECTION INTRAMUSCULAR; INTRAVENOUS DAILY PRN
Status: DISCONTINUED | OUTPATIENT
Start: 2018-05-28 | End: 2018-05-28 | Stop reason: HOSPADM

## 2018-05-28 RX ORDER — ENOXAPARIN SODIUM 100 MG/ML
40 INJECTION SUBCUTANEOUS EVERY 24 HOURS
Status: DISCONTINUED | OUTPATIENT
Start: 2018-05-29 | End: 2018-05-30

## 2018-05-28 RX ORDER — METRONIDAZOLE 500 MG/100ML
500 INJECTION, SOLUTION INTRAVENOUS
Status: COMPLETED | OUTPATIENT
Start: 2018-05-28 | End: 2018-05-29

## 2018-05-28 RX ORDER — LORAZEPAM 2 MG/ML
0.25 INJECTION INTRAMUSCULAR EVERY 4 HOURS PRN
Status: DISCONTINUED | OUTPATIENT
Start: 2018-05-28 | End: 2018-06-01 | Stop reason: HOSPADM

## 2018-05-28 RX ORDER — ONDANSETRON HYDROCHLORIDE 2 MG/ML
INJECTION, SOLUTION INTRAMUSCULAR; INTRAVENOUS
Status: DISCONTINUED | OUTPATIENT
Start: 2018-05-28 | End: 2018-05-28

## 2018-05-28 RX ORDER — BISOPROLOL FUMARATE 5 MG/1
5 TABLET, FILM COATED ORAL DAILY
Status: DISCONTINUED | OUTPATIENT
Start: 2018-05-28 | End: 2018-06-01 | Stop reason: HOSPADM

## 2018-05-28 RX ORDER — METRONIDAZOLE 500 MG/100ML
500 INJECTION, SOLUTION INTRAVENOUS
Status: COMPLETED | OUTPATIENT
Start: 2018-05-28 | End: 2018-05-28

## 2018-05-28 RX ORDER — FENTANYL CITRATE 50 UG/ML
INJECTION, SOLUTION INTRAMUSCULAR; INTRAVENOUS
Status: DISCONTINUED | OUTPATIENT
Start: 2018-05-28 | End: 2018-05-28

## 2018-05-28 RX ORDER — IBUPROFEN 600 MG/1
600 TABLET ORAL 4 TIMES DAILY
Status: DISCONTINUED | OUTPATIENT
Start: 2018-05-28 | End: 2018-05-30

## 2018-05-28 RX ORDER — MEPERIDINE HYDROCHLORIDE 25 MG/ML
12.5 INJECTION INTRAMUSCULAR; INTRAVENOUS; SUBCUTANEOUS ONCE AS NEEDED
Status: DISCONTINUED | OUTPATIENT
Start: 2018-05-28 | End: 2018-05-28 | Stop reason: HOSPADM

## 2018-05-28 RX ORDER — KETOROLAC TROMETHAMINE 30 MG/ML
15 INJECTION, SOLUTION INTRAMUSCULAR; INTRAVENOUS EVERY 6 HOURS PRN
Status: DISPENSED | OUTPATIENT
Start: 2018-05-28 | End: 2018-05-31

## 2018-05-28 RX ORDER — MUPIROCIN 20 MG/G
1 OINTMENT TOPICAL 2 TIMES DAILY
Status: DISCONTINUED | OUTPATIENT
Start: 2018-05-28 | End: 2018-06-01 | Stop reason: HOSPADM

## 2018-05-28 RX ORDER — FENTANYL CITRATE 50 UG/ML
25 INJECTION, SOLUTION INTRAMUSCULAR; INTRAVENOUS EVERY 5 MIN PRN
Status: DISCONTINUED | OUTPATIENT
Start: 2018-05-28 | End: 2018-05-28 | Stop reason: HOSPADM

## 2018-05-28 RX ORDER — NEOSTIGMINE METHYLSULFATE 1 MG/ML
INJECTION, SOLUTION INTRAVENOUS
Status: DISCONTINUED | OUTPATIENT
Start: 2018-05-28 | End: 2018-05-28

## 2018-05-28 RX ORDER — LIDOCAINE HCL/PF 100 MG/5ML
SYRINGE (ML) INTRAVENOUS
Status: DISCONTINUED | OUTPATIENT
Start: 2018-05-28 | End: 2018-05-28

## 2018-05-28 RX ORDER — ROPINIROLE 0.25 MG/1
0.5 TABLET, FILM COATED ORAL NIGHTLY
Status: DISCONTINUED | OUTPATIENT
Start: 2018-05-28 | End: 2018-06-01 | Stop reason: HOSPADM

## 2018-05-28 RX ORDER — ACETAMINOPHEN 10 MG/ML
1000 INJECTION, SOLUTION INTRAVENOUS EVERY 6 HOURS
Status: COMPLETED | OUTPATIENT
Start: 2018-05-28 | End: 2018-05-29

## 2018-05-28 RX ORDER — DIGOXIN 125 MCG
0.25 TABLET ORAL DAILY
Status: DISCONTINUED | OUTPATIENT
Start: 2018-05-28 | End: 2018-06-01 | Stop reason: HOSPADM

## 2018-05-28 RX ORDER — HYDROMORPHONE HYDROCHLORIDE 1 MG/ML
1 INJECTION, SOLUTION INTRAMUSCULAR; INTRAVENOUS; SUBCUTANEOUS EVERY 4 HOURS PRN
Status: DISCONTINUED | OUTPATIENT
Start: 2018-05-28 | End: 2018-05-29 | Stop reason: RX

## 2018-05-28 RX ORDER — HYDRALAZINE HYDROCHLORIDE 25 MG/1
25 TABLET, FILM COATED ORAL EVERY 6 HOURS PRN
Status: DISCONTINUED | OUTPATIENT
Start: 2018-05-28 | End: 2018-06-01 | Stop reason: HOSPADM

## 2018-05-28 RX ORDER — BUPIVACAINE HYDROCHLORIDE AND EPINEPHRINE 2.5; 5 MG/ML; UG/ML
INJECTION, SOLUTION EPIDURAL; INFILTRATION; INTRACAUDAL; PERINEURAL
Status: DISCONTINUED | OUTPATIENT
Start: 2018-05-28 | End: 2018-05-28 | Stop reason: HOSPADM

## 2018-05-28 RX ORDER — HYDROMORPHONE HYDROCHLORIDE 1 MG/ML
0.2 INJECTION, SOLUTION INTRAMUSCULAR; INTRAVENOUS; SUBCUTANEOUS EVERY 5 MIN PRN
Status: COMPLETED | OUTPATIENT
Start: 2018-05-28 | End: 2018-05-28

## 2018-05-28 RX ORDER — OXYCODONE HYDROCHLORIDE 5 MG/1
5 TABLET ORAL
Status: DISCONTINUED | OUTPATIENT
Start: 2018-05-28 | End: 2018-05-28 | Stop reason: HOSPADM

## 2018-05-28 RX ORDER — DEXTROSE, SODIUM CHLORIDE, SODIUM LACTATE, POTASSIUM CHLORIDE, AND CALCIUM CHLORIDE 5; .6; .31; .03; .02 G/100ML; G/100ML; G/100ML; G/100ML; G/100ML
INJECTION, SOLUTION INTRAVENOUS CONTINUOUS
Status: DISCONTINUED | OUTPATIENT
Start: 2018-05-28 | End: 2018-06-01 | Stop reason: HOSPADM

## 2018-05-28 RX ORDER — TAMSULOSIN HYDROCHLORIDE 0.4 MG/1
1 CAPSULE ORAL DAILY
Status: DISCONTINUED | OUTPATIENT
Start: 2018-05-28 | End: 2018-06-01 | Stop reason: HOSPADM

## 2018-05-28 RX ORDER — GLYCOPYRROLATE 0.2 MG/ML
INJECTION INTRAMUSCULAR; INTRAVENOUS
Status: DISCONTINUED | OUTPATIENT
Start: 2018-05-28 | End: 2018-05-28

## 2018-05-28 RX ORDER — ROCURONIUM BROMIDE 10 MG/ML
INJECTION, SOLUTION INTRAVENOUS
Status: DISCONTINUED | OUTPATIENT
Start: 2018-05-28 | End: 2018-05-28

## 2018-05-28 RX ORDER — PHENYLEPHRINE HYDROCHLORIDE 10 MG/ML
INJECTION INTRAVENOUS
Status: DISCONTINUED | OUTPATIENT
Start: 2018-05-28 | End: 2018-05-28

## 2018-05-28 RX ORDER — PROPOFOL 10 MG/ML
VIAL (ML) INTRAVENOUS
Status: DISCONTINUED | OUTPATIENT
Start: 2018-05-28 | End: 2018-05-28

## 2018-05-28 RX ORDER — HYDROMORPHONE HYDROCHLORIDE 1 MG/ML
0.2 INJECTION, SOLUTION INTRAMUSCULAR; INTRAVENOUS; SUBCUTANEOUS EVERY 5 MIN PRN
Status: DISCONTINUED | OUTPATIENT
Start: 2018-05-28 | End: 2018-05-28

## 2018-05-28 RX ADMIN — Medication 0.2 MG: at 03:05

## 2018-05-28 RX ADMIN — Medication 0.2 MG: at 02:05

## 2018-05-28 RX ADMIN — FENTANYL CITRATE 50 MCG: 50 INJECTION, SOLUTION INTRAMUSCULAR; INTRAVENOUS at 11:05

## 2018-05-28 RX ADMIN — KETOROLAC TROMETHAMINE 15 MG: 30 INJECTION, SOLUTION INTRAMUSCULAR at 07:05

## 2018-05-28 RX ADMIN — ACETAMINOPHEN 1000 MG: 10 INJECTION, SOLUTION INTRAVENOUS at 05:05

## 2018-05-28 RX ADMIN — METRONIDAZOLE 500 MG: 500 INJECTION, SOLUTION INTRAVENOUS at 10:05

## 2018-05-28 RX ADMIN — METRONIDAZOLE 500 MG: 500 INJECTION, SOLUTION INTRAVENOUS at 12:05

## 2018-05-28 RX ADMIN — PROPOFOL 80 MG: 10 INJECTION, EMULSION INTRAVENOUS at 11:05

## 2018-05-28 RX ADMIN — FENTANYL CITRATE 50 MCG: 50 INJECTION, SOLUTION INTRAMUSCULAR; INTRAVENOUS at 12:05

## 2018-05-28 RX ADMIN — PHENYLEPHRINE HYDROCHLORIDE 200 MCG: 10 INJECTION INTRAVENOUS at 12:05

## 2018-05-28 RX ADMIN — FENTANYL CITRATE 50 MCG: 50 INJECTION, SOLUTION INTRAMUSCULAR; INTRAVENOUS at 01:05

## 2018-05-28 RX ADMIN — FENTANYL CITRATE 25 MCG: 50 INJECTION, SOLUTION INTRAMUSCULAR; INTRAVENOUS at 02:05

## 2018-05-28 RX ADMIN — DEXTROSE, SODIUM CHLORIDE, SODIUM LACTATE, POTASSIUM CHLORIDE, AND CALCIUM CHLORIDE: 5; .6; .31; .03; .02 INJECTION, SOLUTION INTRAVENOUS at 03:05

## 2018-05-28 RX ADMIN — ROCURONIUM BROMIDE 40 MG: 10 INJECTION, SOLUTION INTRAVENOUS at 11:05

## 2018-05-28 RX ADMIN — ONDANSETRON HYDROCHLORIDE 4 MG: 2 INJECTION INTRAMUSCULAR; INTRAVENOUS at 02:05

## 2018-05-28 RX ADMIN — ESMOLOL HYDROCHLORIDE 20 MG: 20 INJECTION INTRAVENOUS at 12:05

## 2018-05-28 RX ADMIN — LORAZEPAM 0.25 MG: 2 INJECTION, SOLUTION INTRAMUSCULAR; INTRAVENOUS at 09:05

## 2018-05-28 RX ADMIN — FENTANYL CITRATE 25 MCG: 50 INJECTION, SOLUTION INTRAMUSCULAR; INTRAVENOUS at 01:05

## 2018-05-28 RX ADMIN — SODIUM CHLORIDE, SODIUM LACTATE, POTASSIUM CHLORIDE, AND CALCIUM CHLORIDE: .6; .31; .03; .02 INJECTION, SOLUTION INTRAVENOUS at 09:05

## 2018-05-28 RX ADMIN — VECURONIUM BROMIDE FOR INJECTION 2 MG: 1 INJECTION, POWDER, LYOPHILIZED, FOR SOLUTION INTRAVENOUS at 12:05

## 2018-05-28 RX ADMIN — LIDOCAINE HYDROCHLORIDE 50 MG: 20 INJECTION, SOLUTION INTRAVENOUS at 11:05

## 2018-05-28 RX ADMIN — PHENYLEPHRINE HYDROCHLORIDE 200 MCG: 10 INJECTION INTRAVENOUS at 01:05

## 2018-05-28 RX ADMIN — CEFTRIAXONE 2 G: 2 INJECTION, SOLUTION INTRAVENOUS at 12:05

## 2018-05-28 RX ADMIN — PROPOFOL 40 MG: 10 INJECTION, EMULSION INTRAVENOUS at 11:05

## 2018-05-28 RX ADMIN — GLYCOPYRROLATE 0.4 MG: 0.2 INJECTION, SOLUTION INTRAMUSCULAR; INTRAVENOUS at 01:05

## 2018-05-28 RX ADMIN — ONDANSETRON 4 MG: 2 INJECTION, SOLUTION INTRAMUSCULAR; INTRAVENOUS at 12:05

## 2018-05-28 RX ADMIN — ESMOLOL HYDROCHLORIDE 10 MG: 20 INJECTION INTRAVENOUS at 12:05

## 2018-05-28 RX ADMIN — ACETAMINOPHEN 1000 MG: 10 INJECTION, SOLUTION INTRAVENOUS at 12:05

## 2018-05-28 RX ADMIN — MUPIROCIN 1 G: 20 OINTMENT TOPICAL at 10:05

## 2018-05-28 RX ADMIN — CEFAZOLIN SODIUM 2 G: 2 SOLUTION INTRAVENOUS at 09:05

## 2018-05-28 RX ADMIN — IBUPROFEN 600 MG: 600 TABLET ORAL at 05:05

## 2018-05-28 RX ADMIN — IBUPROFEN 600 MG: 600 TABLET ORAL at 09:05

## 2018-05-28 RX ADMIN — PROPOFOL 20 MG: 10 INJECTION, EMULSION INTRAVENOUS at 12:05

## 2018-05-28 RX ADMIN — ROPINIROLE HYDROCHLORIDE 0.5 MG: 0.25 TABLET, FILM COATED ORAL at 09:05

## 2018-05-28 RX ADMIN — SODIUM CHLORIDE, SODIUM LACTATE, POTASSIUM CHLORIDE, AND CALCIUM CHLORIDE: .6; .31; .03; .02 INJECTION, SOLUTION INTRAVENOUS at 01:05

## 2018-05-28 RX ADMIN — DEXAMETHASONE SODIUM PHOSPHATE 4 MG: 4 INJECTION, SOLUTION INTRAMUSCULAR; INTRAVENOUS at 12:05

## 2018-05-28 RX ADMIN — NEOSTIGMINE METHYLSULFATE 3.5 MG: 1 INJECTION INTRAVENOUS at 01:05

## 2018-05-28 NOTE — ANESTHESIA PREPROCEDURE EVALUATION
05/28/2018  Fabian Shine is a 86 y.o., male.    Anesthesia Evaluation    I have reviewed the Patient Summary Reports.    I have reviewed the Nursing Notes.   I have reviewed the Medications.     Review of Systems  Anesthesia Hx:  No problems with previous Anesthesia Hx of Anesthetic complications    Social:  Non-Smoker    Hematology/Oncology:        Current/Recent Cancer. (rectal ) -- Cancer in past history (bladder tumor):    Cardiovascular:   Hypertension, well controlled Dysrhythmias (off xeralto x5days) atrial fibrillation    Pulmonary:  Pulmonary Normal    Renal/:  Renal/ Normal     Neurological:  Neurology Normal Restless legs   Endocrine:  Endocrine Normal        Physical Exam  General:  Well nourished    Airway/Jaw/Neck:  Airway Findings: Mouth Opening: Normal Tongue: Normal  General Airway Assessment: Adult  Oropharynx Findings:  Mallampati: II  TM Distance: Normal, at least 6 cm  Jaw/Neck Findings:  Neck ROM: Normal ROM     Eyes/Ears/Nose:  Eyes/Ears/Nose Findings:    Dental:  Dental Findings:   Chest/Lungs:  Chest/Lungs Findings: Normal Respiratory Rate     Heart/Vascular:  Heart Findings: Rate: Normal  Rhythm: Regular Rhythm        Mental Status:  Mental Status Findings:  Cooperative, Alert and Oriented         Anesthesia Plan  Type of Anesthesia, risks & benefits discussed:  Anesthesia Type:  general  Patient's Preference:   Intra-op Monitoring Plan:   Intra-op Monitoring Plan Comments:   Post Op Pain Control Plan: multimodal analgesia  Post Op Pain Control Plan Comments:   Induction:   IV  Beta Blocker:  Patient is on a Beta-Blocker and has received one dose within the past 24 hours (No further documentation required).       Informed Consent: Patient understands risks and agrees with Anesthesia plan.  Questions answered. Anesthesia consent signed with patient.  ASA Score: 4     Day of  Surgery Review of History & Physical:  There are no significant changes.   H&P completed by Anesthesiologist.       Ready For Surgery From Anesthesia Perspective.

## 2018-05-28 NOTE — BRIEF OP NOTE
[facility]  Brief Operative Note    SUMMARY     Surgery Date: 5/28/2018     Surgeon(s) and Role:     * Willis Huang MD - Primary    Assisting Surgeon: Alexandria AUGUSTINE    Pre-op Diagnosis:  Rectal cancer [C20]    Post-op Diagnosis:  Post-Op Diagnosis Codes:     * Rectal cancer [C20]    Procedure:   Partial proctectomy  End colostomy    Anesthesia: General    Description of Procedure: Ex lap.  Pt with rectal cancer in the Proximal sigmoid colon.  FREDDIE ligated.  Partial proctectomy with creation of end colostomy.      Description of the findings of the procedure: see above.     Estimated Blood Loss: 100 mL         Specimens:   Specimen (12h ago through future)    Start     Ordered    05/28/18 1305  Specimen to Pathology - Surgery  Once     Comments:  Pre-op Diagnosis: Rectal cancer [C20]Post-op Diagnosis: same Procedure(s):[id number] Number of specimens: 1Name of specimens: 1) Rectum      05/28/18 1304

## 2018-05-28 NOTE — TRANSFER OF CARE
Anesthesia Transfer of Care Note    Patient: Fabian Shine    Procedure(s) Performed: Procedure(s) (LRB):  COLOSTOMY (N/A)  PROCTECTOMY (N/A)    Patient location: PACU    Anesthesia Type: general    Transport from OR: Transported from OR on 2-3 L/min O2 by NC with adequate spontaneous ventilation    Post pain: adequate analgesia    Post assessment: no apparent anesthetic complications and tolerated procedure well    Post vital signs: stable    Level of consciousness: responds to stimulation and sedated    Nausea/Vomiting: no nausea/vomiting    Complications: none    Transfer of care protocol was followed      Last vitals:   Visit Vitals  /65 (BP Location: Right arm, Patient Position: Lying)   Pulse 104   Temp 36.4 °C (97.5 °F) (Skin)   Resp 15   Ht 6' (1.829 m)   Wt 79.8 kg (176 lb)   SpO2 100%   BMI 23.87 kg/m²

## 2018-05-28 NOTE — ANESTHESIA POSTPROCEDURE EVALUATION
Anesthesia Post Evaluation    Patient: Fabian Shine    Procedure(s) Performed: Procedure(s) (LRB):  COLOSTOMY (N/A)  PROCTECTOMY (N/A)    Final Anesthesia Type: general  Patient location during evaluation: PACU  Patient participation: Yes- Able to Participate  Level of consciousness: awake and alert  Post-procedure vital signs: reviewed and stable  Pain management: adequate  Airway patency: patent  PONV status at discharge: No PONV  Anesthetic complications: no      Cardiovascular status: hemodynamically stable and blood pressure returned to baseline  Respiratory status: unassisted, spontaneous ventilation and nasal cannula  Hydration status: euvolemic  Follow-up not needed.        Visit Vitals  BP (!) 170/78 (Patient Position: Lying)   Pulse (!) 118   Temp 36.5 °C (97.7 °F) (Oral)   Resp 20   Ht 6' (1.829 m)   Wt 79.8 kg (176 lb)   SpO2 98%   BMI 23.87 kg/m²       Pain/Benjamin Score: Pain Assessment Performed: Yes (5/28/2018  3:41 PM)  Presence of Pain: complains of pain/discomfort (5/28/2018  3:40 PM)  Pain Rating Prior to Med Admin: 6 (5/28/2018  3:41 PM)  Benjamin Score: 8 (5/28/2018  3:40 PM)

## 2018-05-28 NOTE — PLAN OF CARE
Pt belonging bag labeled and cane labeled for the wife to take back to the waiting area, pt wife took his watch and placed it where she wanted it and will take his glasses  When pt transfers to the OR

## 2018-05-28 NOTE — PLAN OF CARE
Pt is calm He and his wife have had questions, all answered  They would like a private room so she can care for him/

## 2018-05-28 NOTE — OP NOTE
DATE OF PROCEDURE:  05/28/2018.    STAFF SURGEON:  Willis Huang M.D.    PREOPERATIVE DIAGNOSIS:  Rectal cancer.    POSTOPERATIVE DIAGNOSIS:  Rectal cancer.    PROCEDURES:  Partial proctectomy with end colostomy.  Primary closure of incarcerated umbilical hernia      ASSISTANT:  Juanita Schumacher PA-C.    ANESTHESIA:  General endotracheal anesthesia.    BLOOD LOSS:  Approximately 100 mL.    INDICATION FOR PROCEDURE:  A pleasant 86-year-old gentleman who has been having   some rectal bleeding.  He has had colonoscopy demonstrating nearly obstructing   mass in the proximal rectum.  He had MRI with no evidence of lymphatic    in the patient's mesorectum and scheduled for proctectomy on the above-mentioned   date.  Given the patient's physical status and overall condition, decision was   made rather the patient to have a colostomy as opposed anastomosis.    DESCRIPTION OF PROCEDURE:  Following signing of informed consent, he received   preoperative antibiotics, taken to the OR, placed in supine position.  SCDs were   placed. General anesthesia was administered without event.  Kiran catheter was   inserted and the patient was placed in the lithotomy position.  Abdomen and   perineum were prepped and draped in standard fashion.  Appropriate timeout   procedure was then performed.  Next, I made a lower midline incision extending   to the umbilicus as there was an incarcerated umbilical hernia present just   above the umbilicus.  I first reduced the incarcerated omentum from the   umbilical hernia and placed it back in the abdominal cavity.  I then opened up   the fascia in the lower midline under direct visualization and entered the   abdominal cavity.  I palpated the liver, no palpable defects appreciated.  Small   bowel was packed up into the right upper quadrant.  Lucas wound retractor was   placed and the patient was noted to have redundant sigmoid colon.  Mass was palpated   in the proximal to mid rectum.  I  mobilized along the white line of Toldt.  I   identified the ureter in the intersigmoidal fossa.  I then cauterized the   mesentery on the right side extending down to the pericolic gutter to make a   rent in the mesentery and isolate the inferior mesenteric vessel after   identification of the ureter.  I performed high ligation of the inferior   mesentery with 0 silk tie and then continued my dissection down posteriorly in   Waldeyer's fascia down to a point in the mid rectum.  I dissected down to the   mid rectum to a point well below the lesion just below where he had   been tattooed.  As mentioned, this was in the mid rectum.  I isolated the   vessels of the mesorectum laterally.  Between Delmis clamps, I performed ligation   this and I stapled across the mid rectum with a contour stapling device.  I   then made a proximal resection in the proximal sigmoid colon.  The mesenteric   vessels were ligated with silk ties.  I then stapled across this and sent the   specimen off the field to Pathology.  I then next irrigated and ensured adequate   hemostasis.  I placed Sully into the pelvis.  I made a circular incision in   the left abdomen where the patient had been marked prior to surgery and carried   down through deep dermal tissue down to the fascia of the rectus muscle.  I made   a vertical incision over this and bluntly  the underlying rectus   muscle and entered the abdominal cavity.  Colostomy was made just 2   fingerbreadths which will easily pass.  I then took Allis and placing it through   the colostomy site.  I grasped the distal descending colon placing  Seprafilm   around this and externalized the colon.  I then placed Seprafilm throughout the   abdominal cavity.  I closed the fascia after injecting 0.25% Marcaine mixed with   Exparel.  I then closed the fascia and then irrigated ensuring hemostasis.  I   then closed the skin incision with 4-0 Monocryl.  I then matured the colostomy   with 3-0  Vicryl sutures.  The patient was extubated, taken to Recovery Room in   stable condition.  There were no immediate complications.  Blood loss was   approximately 100 mL.      ADELITA/IN  dd: 05/28/2018 13:46:05 (CDT)  td: 05/28/2018 15:20:19 (CDT)  Doc ID   #[id number]  Job ID #[id number]    CC:

## 2018-05-28 NOTE — CONSULTS
PCP: Trey Hanks, DO    Medical Consult    Reason for consult: Medical Management    History of Present Illness:  Patient is a 86 y.o. male s/p Partial proctectomy with end colostomy by Dr. ham Patient has PMH significant for Chronic atrial fibrillation, history of urinary bladder tumor, PAD, hypertension, RLS and supraventricular tachycardia. Post-operatively, patient is doing well. Patient denied chest pain, shortness of breath, headache, vision changes, focal neuro-deficits, cough or fever. Patient is complaining of abdominal pain at the surgery site.    Past Medical History:   Diagnosis Date    A-fib     Anticoagulant long-term use     Arthritis     Bladder tumor     Cancer     bladder    Cataracts, bilateral     General anesthetics causing adverse effect in therapeutic use     hallucinations for several days    Hypertension     PAD (peripheral artery disease)     RLS (restless legs syndrome)     Supraventricular arrhythmia     Wears glasses      Past Surgical History:   Procedure Laterality Date    ANGIOPLASTY Left     LEG    APPENDECTOMY      BLADDER TUMOR EXCISION      x 2    COLONOSCOPY N/A 4/17/2018    Procedure: COLONOSCOPY;  Surgeon: Carlos Redmond MD;  Location: [facility];  Service: Endoscopy;  Laterality: N/A;    HERNIA REPAIR Left     inginual hernia    tibial stent Right     2 stents to right leg    TONSILLECTOMY      TUMOR REMOVAL      BLADDER     Family History   Problem Relation Age of Onset    Heart disease Father      Social History   Substance Use Topics    Smoking status: Never Smoker    Smokeless tobacco: Never Used    Alcohol use 0.0 oz/week      Comment: RARELY      Review of patient's allergies indicates:   Allergen Reactions    No known allergies      PTA Medications   Medication Sig    bisoprolol (ZEBETA) 5 MG tablet Take 1 tablet (5 mg total) by mouth once daily.    digoxin (DIGOX) 250 mcg tablet Take 1 tablet (0.25 mg total) by mouth once daily.     FERREX 150 150 mg iron Cap Cap 1 po daily except Sunday. (Patient taking differently: 150 mg once daily. Cap 1 po daily except Sunday.)    hydrocodone-acetaminophen 5-325mg (NORCO) 5-325 mg per tablet Take 1 tablet by mouth every 4 (four) hours as needed for Pain.    [id number]-algal oil (METANX, ALGAL OIL,) 3 mg-35 mg-2 mg -90.314 mg Cap Take 1 capsule by mouth once daily.    pentoxifylline (TRENTAL) 400 mg TbSR Take 1 tablet (400 mg total) by mouth 3 (three) times daily with meals.    pravastatin (PRAVACHOL) 10 MG tablet Take 1 tablet (10 mg total) by mouth once daily. (Patient taking differently: Take 10 mg by mouth every evening. )    psyllium 0.52 gram capsule Take 0.52 g by mouth once daily.    rOPINIRole (REQUIP) 1 MG tablet Tab 1 po at HS. (Patient taking differently: every evening. Tab 1 po at HS.)    triamcinolone acetonide 0.1% (KENALOG) 0.1 % cream Apply topically 2 (two) times daily. Avoid face, axilla, groin    tamsulosin (FLOMAX) 0.4 mg Cp24 Take 1 capsule (0.4 mg total) by mouth once daily.    XARELTO 15 mg Tab TAKE 1 TABLET BY MOUTH ONCE A DAY       Review of Systems:  Constitutional: no fever or chills  Eyes: no visual changes  Ears, nose, mouth, throat, and face: no nasal congestion or sore throat  Respiratory: no cough or shorness of breath  Cardiovascular: no chest pain or palpitations  Gastrointestinal: see HPI  Genitourinary: no hematuria or dysuria  Integument/breast: no rash or pruritis  Hematologic/lymphatic: no easy bruising or lymphadenopathy  Musculoskeletal: no arthralgias or myalgias  Neurological: no seizures or tremors.  Behavioral/Psych: no auditory or visual hallucinations  Endocrine: no heat or cold intolerance     OBJECTIVE:     Vital Signs (Most Recent)  Temp: 97.7 °F (36.5 °C) (05/28/18 1600)  Pulse: (!) 118 (05/28/18 1600)  Resp: 20 (05/28/18 1600)  BP: (!) 170/78 (05/28/18 1600)  SpO2: 98 % (05/28/18 1600)    Physical Exam:  General appearance: well  developed, appears stated age  Head: normocephalic, atraumatic  Eyes:  conjunctivae/corneas clear. PERRL.  Nose: Nares normal. Septum midline.  Throat: lips, mucosa, and tongue normal; teeth and gums normal, no throat erythema.  Neck: supple, symmetrical, trachea midline, no JVD and thyroid not enlarged, symmetric, no tenderness/mass/nodules  Lungs:  clear to auscultation bilaterally and normal respiratory effort  Chest wall: no tenderness  Heart: regular rate and rhythm, S1, S2 normal, no murmur, click, rub or gallop  Abdomen: soft, non-tender non-distented; bowel sounds normal; no masses,  no organomegaly. Abdominal dressing C/D/I. Left paramedian colostomy looks healthy.  Extremities: no cyanosis, clubbing or edema.   Pulses: 2+ and symmetric  Skin: Skin color, texture, turgor normal. No rashes or lesions.  Lymph nodes: Cervical, supraclavicular, and axillary nodes normal.  Neurologic: Normal strength and tone. No focal numbness or weakness. CNII-XII intact.      Laboratory:   CBC:   Recent Labs  Lab 05/24/18  1235   WBC 8.20   RBC 4.66   HGB 15.4   HCT 44.8      MCV 96   MCH 33.0*   MCHC 34.2     CMP:   Recent Labs  Lab 05/24/18  1235   *   CALCIUM 10.1   ALBUMIN 3.7   PROT 6.5      K 4.4   CO2 27      BUN 19   CREATININE 1.0   ALKPHOS 59   ALT 15   AST 19   BILITOT 0.5       Hemoglobin A1C   Date Value Ref Range Status   03/16/2018 5.9 (H) 0.0 - 5.6 % Final     Comment:     Reference Interval:  5.0 - 5.6 Normal   5.7 - 6.4 High Risk   > 6.5 Diabetic    Hgb A1c results are standardized based on the (NGSP) National   Glycohemoglobin Standardization Program.    Hemoglobin A1C levels are related to mean serum/plasma glucose   during the preceding 2-3 months.        12/09/2016 5.8 (H) 0.0 - 5.6 % Final     Comment:     Reference Interval:  5.0 - 5.6 Normal   5.7 - 6.4 High Risk   > 6.5 Diabetic    Hgb A1c results are standardized based on the (NGSP) National   Glycohemoglobin  Standardization Program.    Hemoglobin A1C levels are related to mean serum/plasma glucose   during the preceding 2-3 months.        06/11/2016 5.7 (H) 0.0 - 5.6 % Final     Comment:     Reference Interval:  5.0 - 5.6 Normal   5.7 - 6.4 High Risk   > 6.5 Diabetic    Hgb A1c results are standardized based on the (NGSP) National   Glycohemoglobin Standardization Program.    Hemoglobin A1C levels are related to mean serum/plasma glucose   during the preceding 2-3 months.            Diagnostic Results:  CXR: No acute radiographic findings in the chest.    ASSESSMENT/PLAN:     Active Hospital Problems    Diagnosis  POA    *Rectal cancer s/p Partial proctectomy with end colostomy. [C20]  Continue to follow Reina recommendations. Await bowel functions resumption.  Needs aggressive incentive spirometry.  Follow hemoglobin and hematocrit closely.  Pain control with IV narcotics and antiemetics as needed.    Yes    Chronic atrial fibrillation [I48.2]  Tele-monitoring.  Resume xarelto when okay with Dr. Huang.  Continue PO Digoxin and Bisoprolol.    Yes    History of iron deficiency anemia [Z86.2]  Follow CBC and transfuse as needed.    Not Applicable    Benign prostatic hyperplasia with urinary obstruction [N40.1, N13.8]  Continue Flomax.  Yes      DVT prophylaxis: Use Lovenox 40 mg Sq q day.              Thank you for allowing me to participate in the care of your patient. Will follow with you.

## 2018-05-28 NOTE — H&P (VIEW-ONLY)
Subjective:       Patient ID: Fabian Shine is a 86 y.o. male.    Chief Complaint: Follow-up (F/U MRI results)    HPI  Pleasant 87 yo M whom I am familiar with. Pt with recent diagnosis of mid to proximal rectal cancer.  Pt returns following MRI to discuss surgical options.  Pt suffers with PAD for which he had stents placed [id number].  He is wheel chair bound secondary to complications from his PAD.  No history of heart disease and no significant PSHx.   Review of Systems   Constitutional: Positive for activity change. Negative for appetite change, diaphoresis, fever and unexpected weight change.   HENT: Negative for congestion and dental problem.    Respiratory: Negative for cough, chest tightness and wheezing.    Cardiovascular: Negative for chest pain and palpitations.   Gastrointestinal: Positive for anal bleeding and blood in stool. Negative for abdominal distention, abdominal pain, constipation, diarrhea, nausea, rectal pain and vomiting.   Genitourinary: Positive for difficulty urinating. Negative for dysuria and hematuria.   Musculoskeletal: Negative for back pain and gait problem.   Skin: Negative for color change and pallor.   Neurological: Negative for tremors and seizures.   Hematological: Negative for adenopathy. Does not bruise/bleed easily.   Psychiatric/Behavioral: Negative for agitation and decreased concentration.       Objective:      Physical Exam   Constitutional: He is oriented to person, place, and time. He appears well-developed and well-nourished.   HENT:   Head: Normocephalic and atraumatic.   Eyes: Pupils are equal, round, and reactive to light.   Neck: Normal range of motion. No tracheal deviation present. No thyromegaly present.   Cardiovascular: Normal rate and regular rhythm.  Exam reveals no gallop and no friction rub.    Pulmonary/Chest: Effort normal and breath sounds normal. He has no wheezes. He exhibits no tenderness.   Abdominal: He exhibits no distension and no mass. There is no  tenderness. There is no rebound and no guarding.   Musculoskeletal: Normal range of motion. He exhibits no edema, tenderness or deformity.   Lymphadenopathy:     He has no cervical adenopathy.        Right: No supraclavicular adenopathy present.        Left: No supraclavicular adenopathy present.   Neurological: He is alert and oriented to person, place, and time. Coordination normal.   Skin: Skin is warm. No rash noted. No erythema.   Psychiatric: He has a normal mood and affect.   Vitals reviewed.        MRI reviewed.  No evidence of metastatic disease.  No involvment of mesorectum. No lymphadenopathy    Assessment:     REctal cancer  No diagnosis found.    Plan:       D/w pt.  I have recommended proceeding with surgery.  He agrees to this.  Have also recommended that from a quality of life standpoint he would probably be better served with colostomy as opposed to colorectal anastomosis.  Pt expressed understanding.  He desires to proceed with surgery with end colostomy

## 2018-05-28 NOTE — PLAN OF CARE
Patient is stable at this time.  VSS. Anesthesiologist at patient's bedside and is ok for patient to transfer to the floor.  Dressings clean, dry and intact.  Pain is a 5/10.  No complaints of nausea or vomiting.  Patient tolerating po intake well.

## 2018-05-29 LAB
ANION GAP SERPL CALC-SCNC: 9 MMOL/L
BASOPHILS # BLD AUTO: 0 K/UL
BASOPHILS NFR BLD: 0 %
BUN SERPL-MCNC: 11 MG/DL
CALCIUM SERPL-MCNC: 8.7 MG/DL
CHLORIDE SERPL-SCNC: 100 MMOL/L
CO2 SERPL-SCNC: 26 MMOL/L
CREAT SERPL-MCNC: 0.9 MG/DL
DIFFERENTIAL METHOD: ABNORMAL
EOSINOPHIL # BLD AUTO: 0 K/UL
EOSINOPHIL NFR BLD: 0 %
ERYTHROCYTE [DISTWIDTH] IN BLOOD BY AUTOMATED COUNT: 13.6 %
EST. GFR  (AFRICAN AMERICAN): >60 ML/MIN/1.73 M^2
EST. GFR  (NON AFRICAN AMERICAN): >60 ML/MIN/1.73 M^2
GLUCOSE SERPL-MCNC: 188 MG/DL
HCT VFR BLD AUTO: 43.9 %
HGB BLD-MCNC: 14.9 G/DL
LYMPHOCYTES # BLD AUTO: 0.7 K/UL
LYMPHOCYTES NFR BLD: 6.1 %
MCH RBC QN AUTO: 33 PG
MCHC RBC AUTO-ENTMCNC: 34 G/DL
MCV RBC AUTO: 97 FL
MONOCYTES # BLD AUTO: 0.8 K/UL
MONOCYTES NFR BLD: 7.1 %
NEUTROPHILS # BLD AUTO: 10.3 K/UL
NEUTROPHILS NFR BLD: 86.8 %
PLATELET # BLD AUTO: 153 K/UL
PMV BLD AUTO: 7.7 FL
POTASSIUM SERPL-SCNC: 4.2 MMOL/L
RBC # BLD AUTO: 4.53 M/UL
SODIUM SERPL-SCNC: 135 MMOL/L
WBC # BLD AUTO: 11.9 K/UL

## 2018-05-29 PROCEDURE — 25000003 PHARM REV CODE 250: Performed by: INTERNAL MEDICINE

## 2018-05-29 PROCEDURE — S0030 INJECTION, METRONIDAZOLE: HCPCS | Performed by: SURGERY

## 2018-05-29 PROCEDURE — 97166 OT EVAL MOD COMPLEX 45 MIN: CPT

## 2018-05-29 PROCEDURE — 63600175 PHARM REV CODE 636 W HCPCS: Performed by: SURGERY

## 2018-05-29 PROCEDURE — 99232 SBSQ HOSP IP/OBS MODERATE 35: CPT | Mod: ,,, | Performed by: INTERNAL MEDICINE

## 2018-05-29 PROCEDURE — 97535 SELF CARE MNGMENT TRAINING: CPT

## 2018-05-29 PROCEDURE — 94799 UNLISTED PULMONARY SVC/PX: CPT

## 2018-05-29 PROCEDURE — G8987 SELF CARE CURRENT STATUS: HCPCS | Mod: CK

## 2018-05-29 PROCEDURE — 25000003 PHARM REV CODE 250: Performed by: SURGERY

## 2018-05-29 PROCEDURE — C9113 INJ PANTOPRAZOLE SODIUM, VIA: HCPCS | Performed by: SURGERY

## 2018-05-29 PROCEDURE — 25000003 PHARM REV CODE 250: Performed by: NURSE PRACTITIONER

## 2018-05-29 PROCEDURE — 97110 THERAPEUTIC EXERCISES: CPT

## 2018-05-29 PROCEDURE — 36415 COLL VENOUS BLD VENIPUNCTURE: CPT

## 2018-05-29 PROCEDURE — G8988 SELF CARE GOAL STATUS: HCPCS | Mod: CJ

## 2018-05-29 PROCEDURE — 12000002 HC ACUTE/MED SURGE SEMI-PRIVATE ROOM

## 2018-05-29 PROCEDURE — 80048 BASIC METABOLIC PNL TOTAL CA: CPT

## 2018-05-29 PROCEDURE — 85025 COMPLETE CBC W/AUTO DIFF WBC: CPT

## 2018-05-29 PROCEDURE — 97116 GAIT TRAINING THERAPY: CPT

## 2018-05-29 PROCEDURE — 94761 N-INVAS EAR/PLS OXIMETRY MLT: CPT

## 2018-05-29 PROCEDURE — 97162 PT EVAL MOD COMPLEX 30 MIN: CPT

## 2018-05-29 RX ORDER — ACETAMINOPHEN 10 MG/ML
1000 INJECTION, SOLUTION INTRAVENOUS EVERY 6 HOURS
Status: DISCONTINUED | OUTPATIENT
Start: 2018-05-29 | End: 2018-05-30

## 2018-05-29 RX ORDER — MORPHINE SULFATE 2 MG/ML
2 INJECTION, SOLUTION INTRAMUSCULAR; INTRAVENOUS EVERY 4 HOURS PRN
Status: DISCONTINUED | OUTPATIENT
Start: 2018-05-29 | End: 2018-06-01 | Stop reason: HOSPADM

## 2018-05-29 RX ORDER — MAG HYDROX/ALUMINUM HYD/SIMETH 200-200-20
30 SUSPENSION, ORAL (FINAL DOSE FORM) ORAL EVERY 8 HOURS PRN
Status: DISCONTINUED | OUTPATIENT
Start: 2018-05-29 | End: 2018-06-01 | Stop reason: HOSPADM

## 2018-05-29 RX ORDER — RAMELTEON 8 MG/1
8 TABLET ORAL NIGHTLY PRN
Status: DISCONTINUED | OUTPATIENT
Start: 2018-05-29 | End: 2018-06-01 | Stop reason: HOSPADM

## 2018-05-29 RX ADMIN — ACETAMINOPHEN 1000 MG: 10 INJECTION, SOLUTION INTRAVENOUS at 12:05

## 2018-05-29 RX ADMIN — MUPIROCIN 1 G: 20 OINTMENT TOPICAL at 10:05

## 2018-05-29 RX ADMIN — ROPINIROLE HYDROCHLORIDE 0.5 MG: 0.25 TABLET, FILM COATED ORAL at 08:05

## 2018-05-29 RX ADMIN — METRONIDAZOLE 500 MG: 500 INJECTION, SOLUTION INTRAVENOUS at 05:05

## 2018-05-29 RX ADMIN — ENOXAPARIN SODIUM 40 MG: 100 INJECTION SUBCUTANEOUS at 10:05

## 2018-05-29 RX ADMIN — BISOPROLOL FUMARATE 5 MG: 5 TABLET, COATED ORAL at 10:05

## 2018-05-29 RX ADMIN — MUPIROCIN 1 G: 20 OINTMENT TOPICAL at 08:05

## 2018-05-29 RX ADMIN — IBUPROFEN 600 MG: 600 TABLET ORAL at 02:05

## 2018-05-29 RX ADMIN — ACETAMINOPHEN 1000 MG: 10 INJECTION, SOLUTION INTRAVENOUS at 07:05

## 2018-05-29 RX ADMIN — ALUMINUM HYDROXIDE, MAGNESIUM HYDROXIDE, AND SIMETHICONE 30 ML: 200; 200; 20 SUSPENSION ORAL at 04:05

## 2018-05-29 RX ADMIN — TAMSULOSIN HYDROCHLORIDE 0.4 MG: 0.4 CAPSULE ORAL at 10:05

## 2018-05-29 RX ADMIN — DIGOXIN 0.25 MG: 125 TABLET ORAL at 10:05

## 2018-05-29 RX ADMIN — PANTOPRAZOLE SODIUM 40 MG: 40 INJECTION, POWDER, LYOPHILIZED, FOR SOLUTION INTRAVENOUS at 06:05

## 2018-05-29 RX ADMIN — DEXTROSE, SODIUM CHLORIDE, SODIUM LACTATE, POTASSIUM CHLORIDE, AND CALCIUM CHLORIDE: 5; .6; .31; .03; .02 INJECTION, SOLUTION INTRAVENOUS at 03:05

## 2018-05-29 RX ADMIN — ACETAMINOPHEN 1000 MG: 10 INJECTION, SOLUTION INTRAVENOUS at 01:05

## 2018-05-29 RX ADMIN — IBUPROFEN 600 MG: 600 TABLET ORAL at 08:05

## 2018-05-29 RX ADMIN — RAMELTEON 8 MG: 8 TABLET, FILM COATED ORAL at 10:05

## 2018-05-29 RX ADMIN — ACETAMINOPHEN 1000 MG: 10 INJECTION, SOLUTION INTRAVENOUS at 10:05

## 2018-05-29 RX ADMIN — CEFAZOLIN SODIUM 2 G: 2 SOLUTION INTRAVENOUS at 03:05

## 2018-05-29 RX ADMIN — IBUPROFEN 600 MG: 600 TABLET ORAL at 10:05

## 2018-05-29 NOTE — PLAN OF CARE
Problem: Patient Care Overview  Goal: Plan of Care Review  Outcome: Revised  Plan of care reviewed with pt, pt verbalized understanding.  VSS. Hourly/Q2 hourly rounds completed throughout shift. Pt denies need for pain medication. Comfort level established. Kiran in place draining without complication. Repositioned as tolerated.   Pt has remained free from fall/injury. No skin breakdown noted. Bed in lowest position, brakes locked, call light within reach, SR^x2 for pt safety. Reinforced abdominal dressing. IV abx infused per order. IVF infused per order.  Needs attended to, will continue to monitor.

## 2018-05-29 NOTE — PLAN OF CARE
I attempted to complete the discharge assessment however OT was in the room working with the pt. CM will return to follow up. Reny Barrett LMSW     05/29/18 1127   Discharge Assessment   Assessment Type Discharge Planning Assessment

## 2018-05-29 NOTE — PT/OT/SLP EVAL
"Physical Therapy Evaluation    Patient Name:  Fabian Shine   MRN:  [id number]    Recommendations:     Discharge Recommendations:  home with home health, home health PT   Discharge Equipment Recommendations: none   Barriers to discharge: None    Assessment:     Fabian Shine is a 86 y.o. male admitted with a medical diagnosis of Rectal cancer.  He presents with the following impairments/functional limitations:  weakness, impaired functional mobilty, gait instability, impaired self care skills, impaired balance, decreased lower extremity function, decreased safety awareness, pain, impaired skin . Pt presents with poor gait safety as he is impulsive and set in ways. Pt normally ambulates with rollator in L hand and a standard cane with R hand. Spouse present and concurred. Pt will benefit from continued therapies    Rehab Prognosis:  fair; patient would benefit from acute skilled PT services to address these deficits and reach maximum level of function.      Recent Surgery: Procedure(s) (LRB):  COLOSTOMY (N/A)  PROCTECTOMY (N/A) 1 Day Post-Op    Plan:     During this hospitalization, patient to be seen daily to address the above listed problems via gait training, therapeutic activities, therapeutic exercises  · Plan of Care Expires:  06/08/18   Plan of Care Reviewed with: patient, spouse    Subjective     Communicated with nurse Oropeza prior to session.  Patient found supine upon PT entry to room, agreeable to evaluation.    Stated of being tired and has been a long day  Pt stated "could not teach an old dog new tricks"- pt stated that he walks with rollator and cane at the same time and "it works"  Pt is a retired     Chief Complaint: none  Patient comments/goals: get walking again  Pain/Comfort:  · Pain Rating 1: 0/10    Patients cultural, spiritual, Hoahaoism conflicts given the current situation:      Living Environment:  Home with spouse  Prior to admission, patients level of function was " ambulatory/functional.  Patient has the following equipment: rollator, cane, straight.  DME owned (not currently used): .  Upon discharge, patient will have assistance from spouse.    Objective:     Patient found with: peripheral IV, telemetry, colostomy     General Precautions: Standard, fall   Orthopedic Precautions:N/A   Braces: N/A     Exams:  · RLE ROM: WFL  · RLE Strength: WFL  · LLE ROM: WFL  · LLE Strength: WFL    Functional Mobility:  · Bed Mobility:     · Rolling Right: minimum assistance  · Supine to Sit: minimum assistance  · Transfers:     · Sit to Stand:  minimum assistance with rolling walker  · Bed to Chair: minimum assistance with  rolling walker  using  Stand Pivot  · Gait: 50ft with RW but requiring frequent cueing for safety and to keep self inside walker- pt pushes walker arm length distance away from where he is. second attempt, pt amb with RW and SC at the same time 50 ft with poor balance and safety. Charge Nurse Griselda assisted PT    AM-PAC 6 CLICK MOBILITY  Total Score:15       Therapeutic Activities and Exercises:   pt completed AP, GS x 20 reps  OOB to chair post PT  Spouse present    Patient left up in chair with all lines intact, call button in reach and spouse present.    GOALS:    Physical Therapy Goals        Problem: Physical Therapy Goal    Goal Priority Disciplines Outcome Goal Variances Interventions   Physical Therapy Goal     PT/OT, PT      Description:  Goals to be met by: 2018     Patient will increase functional independence with mobility by performin. Supine to sit with MInimal Assistance  2. Sit to stand transfer with Contact Guard Assistance  3. Bed to chair transfer with Contact Guard Assistance using Rolling Walker  4. Gait  x 250 feet with Minimal Assistance using Rolling Walker./cane   5. Lower extremity exercise program x20 reps per handout, with assistance as needed                      History:     Past Medical History:   Diagnosis Date    A-fib      Anticoagulant long-term use     Arthritis     Bladder tumor     Cancer     bladder    Cataracts, bilateral     General anesthetics causing adverse effect in therapeutic use     hallucinations for several days    Hypertension     PAD (peripheral artery disease)     RLS (restless legs syndrome)     Supraventricular arrhythmia     Wears glasses        Past Surgical History:   Procedure Laterality Date    ANGIOPLASTY Left     LEG    APPENDECTOMY      BLADDER TUMOR EXCISION      x 2    COLONOSCOPY N/A 4/17/2018    Procedure: COLONOSCOPY;  Surgeon: Carlos Redmond MD;  Location: [facility] ENDO;  Service: Endoscopy;  Laterality: N/A;    COLOSTOMY N/A 5/28/2018    Procedure: COLOSTOMY;  Surgeon: Willis Huang MD;  Location: [facility] OR;  Service: Colon and Rectal;  Laterality: N/A;    HERNIA REPAIR Left     inginual hernia    tibial stent Right     2 stents to right leg    TONSILLECTOMY      TUMOR REMOVAL      BLADDER       Clinical Decision Making:     History  Co-morbidities and personal factors that may impact the plan of care Examination  Body Structures and Functions, activity limitations and participation restrictions that may impact the plan of care Clinical Presentation   Decision Making/ Complexity Score   Co-morbidities:   [] Time since onset of injury / illness / exacerbation  [] Status of current condition  []Patient's cognitive status and safety concerns    [] Multiple Medical Problems (see med hx)  Personal Factors:   [] Patient's age  [] Prior Level of function   [] Patient's home situation (environment and family support)  [] Patient's level of motivation  [] Expected progression of patient      HISTORY:(criteria)    [] 99283 - no personal factors/history    [] 40692 - has 1-2 personal factor/comorbidity     [] 45559 - has >3 personal factor/comorbidity     Body Regions:  [] Objective examination findings  [] Head     []  Neck  [] Trunk   [] Upper Extremity  [] Lower Extremity    Body  Systems:  [] For communication ability, affect, cognition, language, and learning style: the assessment of the ability to make needs known, consciousness, orientation (person, place, and time), expected emotional /behavioral responses, and learning preferences (eg, learning barriers, education  needs)  [] For the neuromuscular system: a general assessment of gross coordinated movement (eg, balance, gait, locomotion, transfers, and transitions) and motor function  (motor control and motor learning)  [] For the musculoskeletal system: the assessment of gross symmetry, gross range of motion, gross strength, height, and weight  [] For the integumentary system: the assessment of pliability(texture), presence of scar formation, skin color, and skin integrity  [] For cardiovascular/pulmonary system: the assessment of heart rate, respiratory rate, blood pressure, and edema     Activity limitations:    [] Patient's cognitive status and saf ety concerns          [] Status of current condition      [] Weight bearing restriction  [] Cardiopulmunary Restriction    Participation Restrictions:   [] Goals and goal agreement with the patient     [] Rehab potential (prognosis) and probable outcome      Examination of Body System: (criteria)    [] 83487 - addressing 1-2 elements    [] 13765 - addressing a total of 3 or more elements     [] 28860 -  Addressing a total of 4 or more elements         Clinical Presentation: (criteria)  Choose one     On examination of body system using standardized tests and measures patient presents with (CHOOSE ONE) elements from any of the following: body structures and functions, activity limitations, and/or participation restrictions.  Leading to a clinical presentation that is considered (CHOOSE ONE)                              Clinical Decision Making  (Eval Complexity):  Choose One     Time Tracking:     PT Received On: 05/29/18  PT Start Time: 1543     PT Stop Time: 1614  PT Total Time (min): 31  min     Billable Minutes: Evaluation 8 and Gait Training 23      Malini Mac, PT  05/29/2018

## 2018-05-29 NOTE — PLAN OF CARE
Problem: Occupational Therapy Goal  Goal: Occupational Therapy Goal  Goals to be met by: 6/12/2018     Patient will increase functional independence with ADLs by performing:    LE Dressing with Minimal Assistance and Assistive Devices as needed.  Grooming while standing at sink with Contact Guard Assistance.  Toileting from toilet with Stand-by Assistance for hygiene and clothing management.   Supine to sit with Contact Guard Assistance.  Stand pivot transfers with Contact Guard Assistance.  Toilet transfer to toilet with Contact Guard Assistance.  Upper extremity exercise program x25 reps per handout, with independence.    Outcome: Ongoing (interventions implemented as appropriate)  OT evaluation completed today. Goals & care plan established.    GABRIEL Vieira  5/29/2018

## 2018-05-29 NOTE — PT/OT/SLP EVAL
"Occupational Therapy   Evaluation    Name: Fabian Shine  MRN: [id number]  Admitting Diagnosis:  Rectal cancer 1 Day Post-Op    Recommendations:     Discharge Recommendations: nursing facility, skilled  Discharge Equipment Recommendations:  none  Barriers to discharge:  None    History:     Occupational Profile:  Living Environment: Pt lives with wife in [facility] with 1 [address].  Previous level of function: Pt required assistance with dressing. Pt able to bathe with mod I. Pt was ambulatory with rollator and cane.  Equipment Owned:  rollator, cane, straight, shower chair  Assistance upon Discharge: Pt will receive assistance from wife.    Past Medical History:   Diagnosis Date    A-fib     Anticoagulant long-term use     Arthritis     Bladder tumor     Cancer     bladder    Cataracts, bilateral     General anesthetics causing adverse effect in therapeutic use     hallucinations for several days    Hypertension     PAD (peripheral artery disease)     RLS (restless legs syndrome)     Supraventricular arrhythmia     Wears glasses        Past Surgical History:   Procedure Laterality Date    ANGIOPLASTY Left     LEG    APPENDECTOMY      BLADDER TUMOR EXCISION      x 2    COLONOSCOPY N/A 4/17/2018    Procedure: COLONOSCOPY;  Surgeon: Carlos Redmond MD;  Location: [facility];  Service: Endoscopy;  Laterality: N/A;    COLOSTOMY N/A 5/28/2018    Procedure: COLOSTOMY;  Surgeon: Willis Huang MD;  Location: [facility] OR;  Service: Colon and Rectal;  Laterality: N/A;    HERNIA REPAIR Left     inginual hernia    tibial stent Right     2 stents to right leg    TONSILLECTOMY      TUMOR REMOVAL      BLADDER       Subjective     Chief Complaint: pain in lower abdomen  Patient comments/goals: "I'm going to need all the help I can get."  Communicated with: nurse Hubbard prior to session.  Pain/Comfort:  · Pain Rating 1: 5/10  · Location - Side 1: Bilateral  · Location - Orientation 1: lower  · Location 1: abdomen  · Pain " Addressed 1: Distraction, Reposition  · Pain Rating Post-Intervention 1: 5/10    Patients cultural, spiritual, Cheondoism conflicts given the current situation:      Objective:     Patient found with: telemetry, peripheral IV, colostomy    General Precautions: Standard,     Orthopedic Precautions:N/A   Braces: N/A     Occupational Performance:    Bed Mobility:    · Patient completed Scooting/Bridging with contact guard assistance  · Patient completed Supine to Sit with moderate assistance    Functional Mobility/Transfers:  · Patient completed Sit <> Stand Transfer with moderate assistance  with  rolling walker   · Patient completed Toilet Transfer Stand Pivot technique with moderate assistance with  rolling walker   · Patient completed Bed>Chair transfer using Stand Pivot Technique with moderate assistance and rolling walker.  · Functional Mobility: Pt ambulated with min A and RW from EOB to bathroom. [place] to bathroom, pt required a sitting rest break 2/2 increased fatigue. Pt then ambulated from bathroom back to EOB with mod A and RW. Pt required max cues to keep RW within close distance of himself.     Activities of Daily Living:  · LB Dressing: maximal assistance to don/doff socks at EOB.  · Toileting: minimum assistance with balance while pt stood at toilet to perform isabel hygiene.    Cognitive/Visual Perceptual:  Cognitive/Psychosocial Skills:     -       Oriented to: Person, Place and Situation   -       Follows Commands/attention:Follows two-step commands  -       Communication: clear/fluent  -       Safety awareness/insight to disability: impaired   -       Mood/Affect/Coping skills/emotional control: Appropriate to situation  Visual/Perceptual:      -Intact    Physical Exam:  Balance:    -       Static Sitting - min A > CGA; Static Standing - mod A > min A  Postural examination/scapula alignment:    -       Rounded shoulders  -       Forward head  Upper Extremity Range of Motion:     -       Right Upper  "Extremity: WFL  -       Left Upper Extremity: WFL  Upper Extremity Strength:    -       Right Upper Extremity: 3+/5  -       Left Upper Extremity: 3+/5   Strength:    -       Right Upper Extremity: WFL  -       Left Upper Extremity: WFL  Fine Motor Coordination: -       Intact  Gross motor coordination:   WFL    Patient left up in chair with call button in reach and wife present    AMPA 6 Click:  [facility] Total Score: 17    Treatment & Education:  OT ed patient on safety with walker use for functional mobility with cues for hand placement & sequencing.     OT educated patient on energy conservation techniques to reduce demand on cardiovascular system with performance of ADL tasks.     OT issued red theraband to pt & educated pt on B UE resistive HEP. Pt performed B UE resistive therapeutic exercise x 8 reps each horizontal ad/abduction, shoulder flexion/extension & elbow flexion/extension with rest breaks taken between sets. Pt able to perform all exercises with Min A & cues after demonstration provided.      Education:    Assessment:     Fabian Shine is a 86 y.o. male with a medical diagnosis of Rectal cancer.  Performance deficits affecting function are impaired endurance, weakness, impaired self care skills, impaired functional mobilty, gait instability, decreased lower extremity function, decreased safety awareness, pain, impaired balance. He presents with decreased endurance with ambulation, requiring sitting rest breaks. Pt required frequent cueing to ensure safety during transfers and functional mobility with RW.     Rehab Prognosis:  fair; patient would benefit from acute skilled OT services to address these deficits and reach maximum level of function.         Clinical Decision Makin.  OT Mod:  "Pt evaluation falls under moderate complexity for evaluation coding due to identification of 3-5 performance deficits noted as stated above. Eval required Min/Mod assistance to complete on this date and " "detailed assessment(s) were utilized. Moreover, an expanded review of history and occupational profile obtained with additional review of cognitive, physical and psychosocial hx."     Plan:     Patient to be seen 3 x/week to address the above listed problems via self-care/home management, therapeutic activities, therapeutic exercises  · Plan of Care Expires: 06/12/18  · Plan of Care Reviewed with: patient, spouse    This Plan of care has been discussed with the patient who was involved in its development and understands and is in agreement with the identified goals and treatment plan    GOALS:    Occupational Therapy Goals        Problem: Occupational Therapy Goal    Goal Priority Disciplines Outcome Interventions   Occupational Therapy Goal     OT, PT/OT Ongoing (interventions implemented as appropriate)    Description:  Goals to be met by: 6/12/2018     Patient will increase functional independence with ADLs by performing:    LE Dressing with Minimal Assistance and Assistive Devices as needed.  Grooming while standing at sink with Contact Guard Assistance.  Toileting from toilet with Stand-by Assistance for hygiene and clothing management.   Supine to sit with Contact Guard Assistance.  Stand pivot transfers with Contact Guard Assistance.  Toilet transfer to toilet with Contact Guard Assistance.  Upper extremity exercise program x25 reps per handout, with independence.                      Time Tracking:     OT Date of Treatment: 05/29/18  OT Start Time: 1100  OT Stop Time: 1155  OT Total Time (min): 55 min    Billable Minutes:Evaluation 10  Self Care/Home Management 25  Therapeutic Exercise 20    Harsh Nicole, OT  5/29/2018    "

## 2018-05-29 NOTE — PROGRESS NOTES
Progress Note  [facility]  Patient Name:Fabian Shine  MRN:  [id number]  Patient Class: IP- Inpatient  Admit Date: 5/28/2018  Length of Stay: 1 days  Expected Discharge Date:   Attending Physician: Willis Huang MD  Primary Care Provider:  Trey Hanks DO    SUBJECTIVE:     Principal Problem: Rectal cancer  Initial history of present illness: Patient is a 86 y.o. male s/p Partial proctectomy with end colostomy by Dr. ham Patient has PMH significant for Chronic atrial fibrillation, history of urinary bladder tumor, PAD, hypertension, RLS and supraventricular tachycardia. Post-operatively, patient is doing well. Patient denied chest pain, shortness of breath, headache, vision changes, focal neuro-deficits, cough or fever. Patient is complaining of abdominal pain at the surgery site.    PMH/PSH/SH/FH/Meds: reviewed.    Symptoms/Review of Systems: Patient working with PT. No output in ostomy. No shortness of breath, cough, chest pain or headache, fever or abdominal pain.     Diet: NPO  Activity level: Normal.    Pain:  Patient reports no pain.       OBJECTIVE:   Vital Signs (Most Recent):      Temp: 97.6 °F (36.4 °C) (05/29/18 0750)  Pulse: 81 (05/29/18 0750)  Resp: 16 (05/29/18 0750)  BP: (!) 116/57 (05/29/18 0750)  SpO2: (!) 94 % (05/29/18 0750)       Vital Signs Range (Last 24H):  Temp:  [96.9 °F (36.1 °C)-97.9 °F (36.6 °C)]   Pulse:  []   Resp:  [14-22]   BP: (108-176)/(55-88)   SpO2:  [93 %-100 %]     Weight: 79.8 kg (176 lb)  Body mass index is 23.87 kg/m².    Intake/Output Summary (Last 24 hours) at 05/29/18 0813  Last data filed at 05/29/18 0600   Gross per 24 hour   Intake           4492.5 ml   Output             2100 ml   Net           2392.5 ml     Physical Examination:  General appearance: well developed, appears stated age  Head: normocephalic, atraumatic  Eyes:  conjunctivae/corneas clear. PERRL.  Nose: Nares normal. Septum midline.  Throat: lips, mucosa, and tongue normal; teeth and  gums normal, no throat erythema.  Neck: supple, symmetrical, trachea midline, no JVD and thyroid not enlarged, symmetric, no tenderness/mass/nodules  Lungs:  clear to auscultation bilaterally and normal respiratory effort  Chest wall: no tenderness  Heart: regular rate and rhythm, S1, S2 normal, no murmur, click, rub or gallop  Abdomen: soft, non-tender non-distented; bowel sounds normal; no masses,  no organomegaly. Abdominal dressing C/D/I. Left paramedian colostomy looks healthy.  Extremities: no cyanosis, clubbing or edema.   Pulses: 2+ and symmetric  Skin: Skin color, texture, turgor normal. No rashes or lesions.  Lymph nodes: Cervical, supraclavicular, and axillary nodes normal.  Neurologic: Normal strength and tone. No focal numbness or weakness. CNII-XII intact.      CBC:    Recent Labs  Lab 05/24/18  1235 05/29/18  0440   WBC 8.20 11.90   RBC 4.66 4.53*   HGB 15.4 14.9   HCT 44.8 43.9    153   MCV 96 97   MCH 33.0* 33.0*   MCHC 34.2 34.0   BMP    Recent Labs  Lab 05/24/18  1235 05/29/18  0440   * 188*    135*   K 4.4 4.2    100   CO2 27 26   BUN 19 11   CREATININE 1.0 0.9   CALCIUM 10.1 8.7      Diagnostic Results:  Microbiology Results (last 7 days)     ** No results found for the last 168 hours. **         CXR: No acute radiographic findings in the chest.    Assessment/Plan:      *Rectal cancer s/p Partial proctectomy with end colostomy. [C20]  Continue to follow Reina recommendations. Await bowel functions resumption.  Needs aggressive incentive spirometry.  Follow hemoglobin and hematocrit closely.  Pain control with PO narcotics and antiemetics as needed.      Yes    Chronic atrial fibrillation [I48.2]  Tele-monitoring.  Resume xarelto when okay with Dr. Huang.  Continue PO Digoxin and Bisoprolol.      Yes    History of iron deficiency anemia [Z86.2]  Follow CBC and transfuse as needed.      Not Applicable    Benign prostatic hyperplasia with urinary obstruction [N40.1,  N13.8]  Continue Flomax.   Yes         VTE Risk Mitigation         Ordered     enoxaparin injection 40 mg  Daily      05/28/18 1633        Jayden Reed MD  Department of Hospital Medicine   [facility]

## 2018-05-29 NOTE — PLAN OF CARE
Problem: Physical Therapy Goal  Goal: Physical Therapy Goal  Goals to be met by: 2018     Patient will increase functional independence with mobility by performin. Supine to sit with MInimal Assistance  2. Sit to stand transfer with Contact Guard Assistance  3. Bed to chair transfer with Contact Guard Assistance using Rolling Walker  4. Gait  x 250 feet with Minimal Assistance using Rolling Walker./cane   5. Lower extremity exercise program x20 reps per handout, with assistance as needed    PT eval and treat completed. Pt ambulated with RW/cane 50ft x2 with min/mod assist. Pt is impulsive and set in own ways

## 2018-05-29 NOTE — PROGRESS NOTES
05/29/18 0840   Patient Assessment/Suction   Level of Consciousness (AVPU) alert   PRE-TX-O2-ETCO2   O2 Device (Oxygen Therapy) room air   SpO2 98 %   Pulse Oximetry Type Intermittent   $ Pulse Oximetry - Multiple Charge Pulse Oximetry - Multiple   Incentive Spirometer   $ Incentive Spirometer Charges done with encouragement   Administration (Incentive Spirometer) done with encouragement   Number of Repetitions (Incentive Spirometer) 10   Level (mL) (Incentive Spirometer) 2250   Patient Tolerance good

## 2018-05-29 NOTE — PROGRESS NOTES
POD 1 s/p proctectomy with end colostomy  Pt resting comfortably. Notes abdominal pain. No significant output from ostomy      Wt Readings from Last 3 Encounters:   05/28/18 79.8 kg (176 lb)   05/24/18 79.8 kg (176 lb)   05/18/18 79.8 kg (176 lb)     Temp Readings from Last 3 Encounters:   05/29/18 97.6 °F (36.4 °C) (Oral)   05/03/18 97.6 °F (36.4 °C) (Skin)   04/17/18 97.7 °F (36.5 °C) (Oral)     BP Readings from Last 3 Encounters:   05/29/18 (!) 116/57   05/18/18 121/75   05/11/18 132/65     Pulse Readings from Last 3 Encounters:   05/29/18 81   05/18/18 86   05/11/18 83     AAox3  Sinus  Soft/nd/appt ttp  Ostomy: pink and patent  Inc: c/d/i    Lab Results   Component Value Date    WBC 11.90 05/29/2018    HGB 14.9 05/29/2018    HCT 43.9 05/29/2018    MCV 97 05/29/2018     05/29/2018     BMP  Lab Results   Component Value Date     (L) 05/29/2018    K 4.2 05/29/2018     05/29/2018    CO2 26 05/29/2018    BUN 11 05/29/2018    CREATININE 0.9 05/29/2018    CALCIUM 8.7 05/29/2018    ANIONGAP 9 05/29/2018    ESTGFRAFRICA >60 05/29/2018    EGFRNONAA >60 05/29/2018     A/P: POD 1 s/p proctectomy with end colostomy   Ambulate  Aggressive PT/OT  Adv to full liquid diet

## 2018-05-29 NOTE — NURSING
Pt complains of discomfort r/t RLS. Wife states pt takes 0.5 mg of Requip nightly. Pts BP is 176/87 with no PRN meds on MAR. Notified NP on call via secure chat, new orders placed.

## 2018-05-30 LAB
ANION GAP SERPL CALC-SCNC: 7 MMOL/L
BASOPHILS # BLD AUTO: 0 K/UL
BASOPHILS NFR BLD: 0.2 %
BUN SERPL-MCNC: 14 MG/DL
CALCIUM SERPL-MCNC: 9.2 MG/DL
CHLORIDE SERPL-SCNC: 102 MMOL/L
CO2 SERPL-SCNC: 28 MMOL/L
CREAT SERPL-MCNC: 0.9 MG/DL
DIFFERENTIAL METHOD: ABNORMAL
EOSINOPHIL # BLD AUTO: 0.1 K/UL
EOSINOPHIL NFR BLD: 0.5 %
ERYTHROCYTE [DISTWIDTH] IN BLOOD BY AUTOMATED COUNT: 14.1 %
EST. GFR  (AFRICAN AMERICAN): >60 ML/MIN/1.73 M^2
EST. GFR  (NON AFRICAN AMERICAN): >60 ML/MIN/1.73 M^2
GLUCOSE SERPL-MCNC: 133 MG/DL
HCT VFR BLD AUTO: 41.4 %
HGB BLD-MCNC: 14.3 G/DL
LYMPHOCYTES # BLD AUTO: 1 K/UL
LYMPHOCYTES NFR BLD: 9.7 %
MCH RBC QN AUTO: 33.3 PG
MCHC RBC AUTO-ENTMCNC: 34.6 G/DL
MCV RBC AUTO: 96 FL
MONOCYTES # BLD AUTO: 0.7 K/UL
MONOCYTES NFR BLD: 6.6 %
NEUTROPHILS # BLD AUTO: 9 K/UL
NEUTROPHILS NFR BLD: 83 %
PLATELET # BLD AUTO: 146 K/UL
PMV BLD AUTO: 7.6 FL
POTASSIUM SERPL-SCNC: 3.9 MMOL/L
RBC # BLD AUTO: 4.3 M/UL
SODIUM SERPL-SCNC: 137 MMOL/L
WBC # BLD AUTO: 10.8 K/UL

## 2018-05-30 PROCEDURE — 63600175 PHARM REV CODE 636 W HCPCS: Performed by: INTERNAL MEDICINE

## 2018-05-30 PROCEDURE — 25000003 PHARM REV CODE 250: Performed by: SURGERY

## 2018-05-30 PROCEDURE — 36415 COLL VENOUS BLD VENIPUNCTURE: CPT

## 2018-05-30 PROCEDURE — 97110 THERAPEUTIC EXERCISES: CPT

## 2018-05-30 PROCEDURE — C9113 INJ PANTOPRAZOLE SODIUM, VIA: HCPCS | Performed by: SURGERY

## 2018-05-30 PROCEDURE — G8988 SELF CARE GOAL STATUS: HCPCS | Mod: CJ

## 2018-05-30 PROCEDURE — 86580 TB INTRADERMAL TEST: CPT | Performed by: INTERNAL MEDICINE

## 2018-05-30 PROCEDURE — 25000003 PHARM REV CODE 250: Performed by: NURSE PRACTITIONER

## 2018-05-30 PROCEDURE — 63600175 PHARM REV CODE 636 W HCPCS: Performed by: SURGERY

## 2018-05-30 PROCEDURE — 97166 OT EVAL MOD COMPLEX 45 MIN: CPT

## 2018-05-30 PROCEDURE — 97535 SELF CARE MNGMENT TRAINING: CPT

## 2018-05-30 PROCEDURE — 85025 COMPLETE CBC W/AUTO DIFF WBC: CPT

## 2018-05-30 PROCEDURE — 80048 BASIC METABOLIC PNL TOTAL CA: CPT

## 2018-05-30 PROCEDURE — 25000003 PHARM REV CODE 250: Performed by: INTERNAL MEDICINE

## 2018-05-30 PROCEDURE — 97116 GAIT TRAINING THERAPY: CPT

## 2018-05-30 PROCEDURE — 94761 N-INVAS EAR/PLS OXIMETRY MLT: CPT

## 2018-05-30 PROCEDURE — 12000002 HC ACUTE/MED SURGE SEMI-PRIVATE ROOM

## 2018-05-30 PROCEDURE — G8987 SELF CARE CURRENT STATUS: HCPCS | Mod: CK

## 2018-05-30 PROCEDURE — 94799 UNLISTED PULMONARY SVC/PX: CPT

## 2018-05-30 PROCEDURE — 99232 SBSQ HOSP IP/OBS MODERATE 35: CPT | Mod: ,,, | Performed by: INTERNAL MEDICINE

## 2018-05-30 PROCEDURE — 97530 THERAPEUTIC ACTIVITIES: CPT

## 2018-05-30 RX ADMIN — TAMSULOSIN HYDROCHLORIDE 0.4 MG: 0.4 CAPSULE ORAL at 08:05

## 2018-05-30 RX ADMIN — RIVAROXABAN 15 MG: 15 TABLET, FILM COATED ORAL at 09:05

## 2018-05-30 RX ADMIN — DIGOXIN 0.25 MG: 125 TABLET ORAL at 08:05

## 2018-05-30 RX ADMIN — BISOPROLOL FUMARATE 5 MG: 5 TABLET, COATED ORAL at 08:05

## 2018-05-30 RX ADMIN — LORAZEPAM 0.25 MG: 2 INJECTION, SOLUTION INTRAMUSCULAR; INTRAVENOUS at 11:05

## 2018-05-30 RX ADMIN — MUPIROCIN 1 G: 20 OINTMENT TOPICAL at 08:05

## 2018-05-30 RX ADMIN — PANTOPRAZOLE SODIUM 40 MG: 40 INJECTION, POWDER, LYOPHILIZED, FOR SOLUTION INTRAVENOUS at 05:05

## 2018-05-30 RX ADMIN — ACETAMINOPHEN 1000 MG: 10 INJECTION, SOLUTION INTRAVENOUS at 05:05

## 2018-05-30 RX ADMIN — DEXTROSE, SODIUM CHLORIDE, SODIUM LACTATE, POTASSIUM CHLORIDE, AND CALCIUM CHLORIDE: 5; .6; .31; .03; .02 INJECTION, SOLUTION INTRAVENOUS at 02:05

## 2018-05-30 RX ADMIN — IBUPROFEN 600 MG: 600 TABLET ORAL at 08:05

## 2018-05-30 RX ADMIN — DEXTROSE, SODIUM CHLORIDE, SODIUM LACTATE, POTASSIUM CHLORIDE, AND CALCIUM CHLORIDE: 5; .6; .31; .03; .02 INJECTION, SOLUTION INTRAVENOUS at 08:05

## 2018-05-30 RX ADMIN — ROPINIROLE HYDROCHLORIDE 0.5 MG: 0.25 TABLET, FILM COATED ORAL at 08:05

## 2018-05-30 RX ADMIN — Medication 5 UNITS: at 12:05

## 2018-05-30 NOTE — PLAN OF CARE
05/29/18 2020   Patient Assessment/Suction   Level of Consciousness (AVPU) alert   PRE-TX-O2-ETCO2   O2 Device (Oxygen Therapy) room air   SpO2 98 %   Pulse Oximetry Type Intermittent   Pulse 87   Resp 16   Incentive Spirometer   $ Incentive Spirometer Charges done with encouragement   Administration (Incentive Spirometer) done with encouragement   Number of Repetitions (Incentive Spirometer) 10   Level (mL) (Incentive Spirometer) 2250   Patient Tolerance good

## 2018-05-30 NOTE — PLAN OF CARE
Problem: Physical Therapy Goal  Goal: Physical Therapy Goal  Goals to be met by: 2018     Patient will increase functional independence with mobility by performin. Supine to sit with MInimal Assistance  2. Sit to stand transfer with Contact Guard Assistance  3. Bed to chair transfer with Contact Guard Assistance using Rolling Walker  4. Gait  x 250 feet with Minimal Assistance using Rolling Walker./cane   5. Lower extremity exercise program x20 reps per handout, with assistance as needed     Outcome: Ongoing (interventions implemented as appropriate)  PT for bed mobility, transfer training, gait training and therex

## 2018-05-30 NOTE — PROGRESS NOTES
Progress Note  [facility] Medicine  Patient Name:Fabian Shine  MRN:  [id number]  Patient Class: IP- Inpatient  Admit Date: 5/28/2018  Length of Stay: 2 days  Expected Discharge Date:   Attending Physician: Willis Huang MD  Primary Care Provider:  Trey Hanks DO    SUBJECTIVE:     Principal Problem: Rectal cancer  Initial history of present illness: Patient is a 86 y.o. male s/p Partial proctectomy with end colostomy by Dr. ham Patient has PMH significant for Chronic atrial fibrillation, history of urinary bladder tumor, PAD, hypertension, RLS and supraventricular tachycardia. Post-operatively, patient is doing well. Patient denied chest pain, shortness of breath, headache, vision changes, focal neuro-deficits, cough or fever. Patient is complaining of abdominal pain at the surgery site.    PMH/PSH/SH/FH/Meds: reviewed.    Symptoms/Review of Systems: Patient working with PT. No output in ostomy. Patient is frail and weak. Patient had some confusion last night. No shortness of breath, cough, chest pain or headache, fever or abdominal pain.     Diet: Full liquids  Activity level: Up with assistance  Pain:  Patient reports no pain.       OBJECTIVE:   Vital Signs (Most Recent):      Temp: 97.2 °F (36.2 °C) (05/30/18 0747)  Pulse: 99 (05/30/18 0747)  Resp: 18 (05/30/18 0747)  BP: (!) 164/78 (05/30/18 0747)  SpO2: (!) 94 % (05/30/18 0747)       Vital Signs Range (Last 24H):  Temp:  [96.3 °F (35.7 °C)-97.5 °F (36.4 °C)]   Pulse:  []   Resp:  [16-20]   BP: (127-164)/(62-79)   SpO2:  [87 %-98 %]     Weight: 79.8 kg (176 lb)  Body mass index is 23.87 kg/m².    Intake/Output Summary (Last 24 hours) at 05/30/18 0850  Last data filed at 05/30/18 0600   Gross per 24 hour   Intake             2080 ml   Output                0 ml   Net             2080 ml     Physical Examination:  General appearance: well developed, appears stated age  Head: normocephalic, atraumatic  Eyes:  conjunctivae/corneas clear. PERRL.  Nose:  Nares normal. Septum midline.  Throat: lips, mucosa, and tongue normal; teeth and gums normal, no throat erythema.  Neck: supple, symmetrical, trachea midline, no JVD and thyroid not enlarged, symmetric, no tenderness/mass/nodules  Lungs:  clear to auscultation bilaterally and normal respiratory effort  Chest wall: no tenderness  Heart: regular rate and rhythm, S1, S2 normal, no murmur, click, rub or gallop  Abdomen: soft, non-tender non-distented; bowel sounds normal; no masses,  no organomegaly. Abdominal dressing C/D/I. Left paramedian colostomy looks healthy. Bag is still empty.  Extremities: no cyanosis, clubbing or edema.   Pulses: 2+ and symmetric  Skin: Skin color, texture, turgor normal. No rashes or lesions.  Lymph nodes: Cervical, supraclavicular, and axillary nodes normal.  Neurologic: Normal strength and tone. No focal numbness or weakness. CNII-XII intact.      CBC:    Recent Labs  Lab 05/24/18  1235 05/29/18  0440 05/30/18  0409   WBC 8.20 11.90 10.80   RBC 4.66 4.53* 4.30*   HGB 15.4 14.9 14.3   HCT 44.8 43.9 41.4    153 146*   MCV 96 97 96   MCH 33.0* 33.0* 33.3*   MCHC 34.2 34.0 34.6   BMP    Recent Labs  Lab 05/24/18  1235 05/29/18  0440 05/30/18  0409   * 188* 133*    135* 137   K 4.4 4.2 3.9    100 102   CO2 27 26 28   BUN 19 11 14   CREATININE 1.0 0.9 0.9   CALCIUM 10.1 8.7 9.2      Diagnostic Results:  Microbiology Results (last 7 days)     ** No results found for the last 168 hours. **         CXR: No acute radiographic findings in the chest.    Assessment/Plan:      *Rectal cancer s/p Partial proctectomy with end colostomy. [C20]  Continue to follow Huang recommendations. Await bowel functions resumption.  Needs aggressive incentive spirometry.  Follow hemoglobin and hematocrit closely.  Pain control with PO narcotics and antiemetics as needed.      Yes    Chronic atrial fibrillation [I48.2]  Tele-monitoring.  Resume xarelto today.  Continue PO Digoxin and  Bisoprolol.      Yes    History of iron deficiency anemia [Z86.2]  Follow CBC and transfuse as needed.      Not Applicable    Benign prostatic hyperplasia with urinary obstruction [N40.1, N13.8]  Continue Flomax.   Yes     Await SNF placement. Discussed with Dr. Huang and his wife.     VTE Risk Mitigation         Ordered     rivaroxaban tablet 15 mg  Daily      05/30/18 6281        Jayden Reed MD  Department of Hospital Medicine   [facility]

## 2018-05-30 NOTE — PT/OT/SLP PROGRESS
Occupational Therapy   Treatment    Name: Fabian Shine  MRN: [id number]  Admitting Diagnosis:  Rectal cancer  2 Days Post-Op    Recommendations:     Discharge Recommendations: home with home health, home health PT, home health OT  Discharge Equipment Recommendations:  shower chair  Barriers to discharge:  None    Subjective   Pt seated in chair and agreeable to OT evaluation.    Communicated with: nurse Membreno prior to session.  Pain/Comfort:  · Pain Rating 1: 0/10  · Pain Rating Post-Intervention 1: 0/10    Patients cultural, spiritual, Confucianist conflicts given the current situation:      Objective:     Patient found with: colostomy, telemetry    General Precautions: Standard, fall   Orthopedic Precautions:N/A   Braces: N/A     Occupational Performance:      Activities of Daily Living:  · Grooming: stand by assistance with oral hygiene and shaving (with an electric shaver).    Patient left up in chair with all lines intact and call button in reach    [facility] 6 Click:  [facility] Total Score: 17    Treatment & Education:  Pt. performed B UE AROM 3 x 8 reps each motion (elbow flexion and extension) with rest breaks taken between each set.  Education:    Assessment:     Fabian Shine is a 86 y.o. male with a medical diagnosis of Rectal cancer.  Performance deficits affecting function are weakness, impaired endurance, impaired self care skills, impaired functional mobilty, impaired skin. He presents with decreased activity endurance with UE exercises 2/2 PT session prior to OT arrival. With encouragement, pt participated in ADLs while seated in chair.     Rehab Prognosis:  fair; patient would benefit from acute skilled OT services to address these deficits and reach maximum level of function.       Plan:     Patient to be seen 3 x/week to address the above listed problems via self-care/home management, therapeutic activities, therapeutic exercises  · Plan of Care Expires: 06/12/18  · Plan of Care Reviewed with: patient,  spouse    This Plan of care has been discussed with the patient who was involved in its development and understands and is in agreement with the identified goals and treatment plan    GOALS:    Occupational Therapy Goals        Problem: Occupational Therapy Goal    Goal Priority Disciplines Outcome Interventions   Occupational Therapy Goal     OT, PT/OT Ongoing (interventions implemented as appropriate)    Description:  Goals to be met by: 6/12/2018     Patient will increase functional independence with ADLs by performing:    LE Dressing with Minimal Assistance and Assistive Devices as needed.  Grooming while standing at sink with Contact Guard Assistance.  Toileting from toilet with Stand-by Assistance for hygiene and clothing management.   Supine to sit with Contact Guard Assistance.  Stand pivot transfers with Contact Guard Assistance.  Toilet transfer to toilet with Contact Guard Assistance.  Upper extremity exercise program x25 reps per handout, with independence.                      Time Tracking:     OT Date of Treatment: 05/30/18  OT Start Time: 1340  OT Stop Time: 1419  OT Total Time (min): 39 min    Billable Minutes:Self Care/Home Management 25  Therapeutic Exercise 14    Harsh Nicole OT  5/30/2018

## 2018-05-30 NOTE — PLAN OF CARE
Problem: Patient Care Overview  Goal: Plan of Care Review  Outcome: Ongoing (interventions implemented as appropriate)  Patient aao x 4. Complains of abdominal pain, controlled with scheduled pain medications. Plan of care reviewed with patient and spouse, verbalized understanding. Voiding without difficulty post lott removal. Dressing changed per Dr. Huang request. Colostomy bag intact. Ambulated with PT. Up in chair throughout day. Remained free from fall and injury. Bed in lowest position, call light within reach and room next to nurses station.

## 2018-05-30 NOTE — PROGRESS NOTES
POD 2 s/p proctectomy with end colostomy  Pt resting comfortably.  No n/v.  Has had some confusion and delirium today.     Pain controlled.    Wt Readings from Last 3 Encounters:   05/28/18 79.8 kg (176 lb)   05/24/18 79.8 kg (176 lb)   05/18/18 79.8 kg (176 lb)     Temp Readings from Last 3 Encounters:   05/30/18 97.2 °F (36.2 °C) (Axillary)   05/03/18 97.6 °F (36.4 °C) (Skin)   04/17/18 97.7 °F (36.5 °C) (Oral)     BP Readings from Last 3 Encounters:   05/30/18 (!) 164/78   05/18/18 121/75   05/11/18 132/65     Pulse Readings from Last 3 Encounters:   05/30/18 99   05/18/18 86   05/11/18 83     AAox3  Sinus  Soft/nt/nd BS present  Ostomy with slight bloody output.  Mucosa pink and viable.      Lab Results   Component Value Date    WBC 10.80 05/30/2018    HGB 14.3 05/30/2018    HCT 41.4 05/30/2018    MCV 96 05/30/2018     (L) 05/30/2018     BMP  Lab Results   Component Value Date     05/30/2018    K 3.9 05/30/2018     05/30/2018    CO2 28 05/30/2018    BUN 14 05/30/2018    CREATININE 0.9 05/30/2018    CALCIUM 9.2 05/30/2018    ANIONGAP 7 (L) 05/30/2018    ESTGFRAFRICA >60 05/30/2018    EGFRNONAA >60 05/30/2018     A/P: POD 2 s/p partial proctectomy with end colostomy  Advance diet as tolerated.  PT/OT  Possible d/c to snf or rehabl later this week

## 2018-05-30 NOTE — PLAN OF CARE
Problem: Patient Care Overview  Goal: Plan of Care Review  Outcome: Ongoing (interventions implemented as appropriate)  Pt alert with periods of confusion. Wife at bedside. Up with assistance. Pt denies pain/ nausea. Iv infusing. Call light in reach. Plan of care reviewed with patient and wife. Verbalized understanding. Colostomy bag with minimal bloody drainage. Hourly rounding utilized for pt safety. Will continue to monitor.

## 2018-05-30 NOTE — PT/OT/SLP PROGRESS
Physical Therapy Treatment    Patient Name:  Fabian Shine   MRN:  [id number]    Recommendations:     Discharge Recommendations:      Discharge Equipment Recommendations:         Assessment:     Fabian Shine is a 86 y.o. male admitted with a medical diagnosis of Rectal cancer.  He presents with the following impairments/functional limitations:  weakness, impaired endurance, impaired self care skills, impaired functional mobilty, gait instability, impaired balance, decreased coordination, decreased upper extremity function, decreased lower extremity function, decreased safety awareness, abnormal tone, decreased ROM, impaired joint extensibility . Pt requires increased cueing and assistance for safety.    Rehab Prognosis:  fair; patient would benefit from acute skilled PT services to address these deficits and reach maximum level of function.      Recent Surgery: Procedure(s) (LRB):  COLOSTOMY (N/A)  PROCTECTOMY (N/A) 2 Days Post-Op    Plan:     During this hospitalization, patient to be seen daily to address the above listed problems via gait training, therapeutic activities, therapeutic exercises  · Plan of Care Expires:  06/08/18   Plan of Care Reviewed with: patient, spouse    Subjective     Communicated with nurse Membreno prior to session.  Patient found supine upon PT entry to room, agreeable to treatment.      Chief Complaint: nausea   Patient comments/goals: pt eager to mobilize. Pt reported he felt better after getting up.  Pain/Comfort:  · Pain Rating 1: 0/10    Patients cultural, spiritual, Orthodoxy conflicts given the current situation:      Objective:     Patient found with: colostomy, telemetry, peripheral IV     General Precautions: Standard, fall   Orthopedic Precautions:N/A   Braces: N/A     Functional Mobility:  · Bed Mobility:     · Scooting: moderate assistance  · Supine to Sit: maximal assistance    · Transfers:     · Sit to Stand:  moderate assistance and of 2 for safety with rolling walker and  cueing for tech (2x's total)  · Bed to Chair: stand-pivot with mod A of 2 for safety using RW. Cueing for tech and safety. Pt able to take a few steps to chair.    · Gait: 20' with max A of 2 using RW. cueing for tech, safety, upright posture and to remain close to RW. Pt with increased instability, excessive knee flexion and forward flexed posture. Chair following for safety        Therapeutic Activities and Exercises:   pt assisted to EOB and reported nausea and feeling like he needed to throw up. After seated rest break at EOB, pt ready to perform chair transfer. Min A required for sitting balance at EOB.   pt assisted to bedside chair.   seated BLE therex: AP, LAQ, marching, hip abd/add x 10 reps each   pt then proceeded to gait training.    Patient left up in chair with all lines intact, call button in reach and nursing notified..    GOALS:    Physical Therapy Goals        Problem: Physical Therapy Goal    Goal Priority Disciplines Outcome Goal Variances Interventions   Physical Therapy Goal     PT/OT, PT      Description:  Goals to be met by: 2018     Patient will increase functional independence with mobility by performin. Supine to sit with MInimal Assistance  2. Sit to stand transfer with Contact Guard Assistance  3. Bed to chair transfer with Contact Guard Assistance using Rolling Walker  4. Gait  x 250 feet with Minimal Assistance using Rolling Walker./cane   5. Lower extremity exercise program x20 reps per handout, with assistance as needed                      Time Tracking:     PT Received On: 18  PT Start Time: 1310     PT Stop Time: 1340  PT Total Time (min): 30 min     Billable Minutes: Gait Training 10, Therapeutic Activity 10 and Therapeutic Exercise 8    Treatment Type: Treatment  PT/PTA: PTA     PTA Visit Number: 1     Nanette Tadeo, PTA  2018

## 2018-05-30 NOTE — PLAN OF CARE
Problem: Occupational Therapy Goal  Goal: Occupational Therapy Goal  Goals to be met by: 6/12/2018     Patient will increase functional independence with ADLs by performing:    LE Dressing with Minimal Assistance and Assistive Devices as needed.  Grooming while standing at sink with Contact Guard Assistance.  Toileting from toilet with Stand-by Assistance for hygiene and clothing management.   Supine to sit with Contact Guard Assistance.  Stand pivot transfers with Contact Guard Assistance.  Toilet transfer to toilet with Contact Guard Assistance.  Upper extremity exercise program x25 reps per handout, with independence.     Outcome: Ongoing (interventions implemented as appropriate)  Pt progressing toward goals. Continue with OT tx.

## 2018-05-30 NOTE — PLAN OF CARE
I sent a 3 day packet, current meds, PT/Ot evals and [facility] [place] via [facility]. CM will continue to follow. Reny Barrett LMSW     05/30/18 1141   Discharge Assessment   Assessment Type Discharge Planning Assessment

## 2018-05-30 NOTE — PLAN OF CARE
Problem: Patient Care Overview  Goal: Plan of Care Review  Outcome: Revised  Pt is awake and alert, confused throughout night, reoriented often.  IVF infusing, no redness or swelling at site.  Cardiac monitoring in place.  VSS, in NAD, pt remains afebrile.  Pt remains free from injury.  Bed in low position, wheels locked, call light within reach.  Will continue to monitor.

## 2018-05-30 NOTE — CONSULTS
Consult to Ostomy Care for Ostomy Education s/p colostomy for rectal cancer. Patient's wife, Kassy, was present, and will initially perform pouch changing for her . She is engaging, pleasant, verbal, and very supportive and active in her 's care. Picture taken.    Colostomy - Located left of the umbilicus, along the natural waistline. Patient wears pants below this area, so the location will not be problematic with clothing. Discussed and demonstrated the pouch changing process. Kassy, removed the pouch using alcohol free adhesive remover, cleaned the stoma and isabel-stoma area with a warm wash cloth, dried the area, measured the stoma with the stoma guide, cut the opening, applied skin barrier protectant, folded the pouch closed, then applied the pouch. She demonstrated good understanding and was able to cut the opening for the next pouch change.     Recommend: Empty pouch when 1/3 to 1/2 full of stool. Change pouch every 2-3 days, or sooner, if no longer holding a good seal. Cut pouch opening 32 mm. Clean site with a warm wet wash cloth, dry. Apply skin barrier protectant to isabel-stoma area, dry. Cover stoma with pouch.     Stoma was red to dark pink in color, moist, budded, slightly oval in shape, and measured 32 mm. There was a small amount of bloody red to brown bowel sweat in pouch. No stool output while changing pouch. Patient and wife would like to have single use pouches. Discussed the options and showed them one-piece and two piece single use pouches. Patient has received two packages from [employer], but has not opened them yet. The samples contain 1 and 2 piece pouches, plus skin barrier protectant, adhesive remover, barrier strips, moldable rings, lubricant deodorant, and a carrying case.     Abdominal incision had 11 intact steri-strips. Area was dry, no areas of dehiscence. The stoma was located far enough from the incision to allow for a good seal of the pouch.    Patient did not  participate in education today. Wife explained he is not ready to learn, and will teach him when he is ready. Wife had a lot of questions, that were answered to her satisfaction.    Dr. Huang to sign [facility] DME form which will allow long term supplies to be ordered. Form is at 3d floor nursing station.    Will see patient and wife tomorrow for continued ostomy education.    Wilma Harper RN, CWOCN

## 2018-05-30 NOTE — PLAN OF CARE
Spoke with the pt and his wife along with Dr Reed regarding the discharge plan; I explained SNF as brought up by Dr Huang. They are agreeable to SNF, [facility] and [facility].....MAYA Rolle       05/30/18 8538   Discharge Reassessment   Assessment Type Discharge Planning Reassessment

## 2018-05-30 NOTE — PROGRESS NOTES
05/30/18 0905   Patient Assessment/Suction   Level of Consciousness (AVPU) alert   PRE-TX-O2-ETCO2   O2 Device (Oxygen Therapy) room air   SpO2 97 %   Pulse Oximetry Type Intermittent   $ Pulse Oximetry - Multiple Charge Pulse Oximetry - Multiple   Incentive Spirometer   $ Incentive Spirometer Charges done with encouragement   Administration (Incentive Spirometer) done with encouragement   Number of Repetitions (Incentive Spirometer) 12   Level (mL) (Incentive Spirometer) 2000   Patient Tolerance good

## 2018-05-31 LAB
ANION GAP SERPL CALC-SCNC: 4 MMOL/L
BASOPHILS # BLD AUTO: 0 K/UL
BASOPHILS NFR BLD: 0.2 %
BUN SERPL-MCNC: 9 MG/DL
CALCIUM SERPL-MCNC: 8.9 MG/DL
CHLORIDE SERPL-SCNC: 102 MMOL/L
CO2 SERPL-SCNC: 30 MMOL/L
CREAT SERPL-MCNC: 0.7 MG/DL
DIFFERENTIAL METHOD: ABNORMAL
EOSINOPHIL # BLD AUTO: 0.1 K/UL
EOSINOPHIL NFR BLD: 1.3 %
ERYTHROCYTE [DISTWIDTH] IN BLOOD BY AUTOMATED COUNT: 14 %
EST. GFR  (AFRICAN AMERICAN): >60 ML/MIN/1.73 M^2
EST. GFR  (NON AFRICAN AMERICAN): >60 ML/MIN/1.73 M^2
GLUCOSE SERPL-MCNC: 141 MG/DL
HCT VFR BLD AUTO: 41.2 %
HGB BLD-MCNC: 14.2 G/DL
LYMPHOCYTES # BLD AUTO: 1.2 K/UL
LYMPHOCYTES NFR BLD: 12 %
MCH RBC QN AUTO: 33.4 PG
MCHC RBC AUTO-ENTMCNC: 34.5 G/DL
MCV RBC AUTO: 97 FL
MONOCYTES # BLD AUTO: 0.8 K/UL
MONOCYTES NFR BLD: 7.7 %
NEUTROPHILS # BLD AUTO: 8.1 K/UL
NEUTROPHILS NFR BLD: 78.8 %
PLATELET # BLD AUTO: 159 K/UL
PMV BLD AUTO: 7.5 FL
POTASSIUM SERPL-SCNC: 4.1 MMOL/L
RBC # BLD AUTO: 4.26 M/UL
SODIUM SERPL-SCNC: 136 MMOL/L
WBC # BLD AUTO: 10.3 K/UL

## 2018-05-31 PROCEDURE — 36415 COLL VENOUS BLD VENIPUNCTURE: CPT

## 2018-05-31 PROCEDURE — 25000003 PHARM REV CODE 250: Performed by: SURGERY

## 2018-05-31 PROCEDURE — 97116 GAIT TRAINING THERAPY: CPT

## 2018-05-31 PROCEDURE — 25000003 PHARM REV CODE 250: Performed by: INTERNAL MEDICINE

## 2018-05-31 PROCEDURE — 85025 COMPLETE CBC W/AUTO DIFF WBC: CPT

## 2018-05-31 PROCEDURE — 94799 UNLISTED PULMONARY SVC/PX: CPT

## 2018-05-31 PROCEDURE — 97110 THERAPEUTIC EXERCISES: CPT

## 2018-05-31 PROCEDURE — C9113 INJ PANTOPRAZOLE SODIUM, VIA: HCPCS | Performed by: SURGERY

## 2018-05-31 PROCEDURE — 12000002 HC ACUTE/MED SURGE SEMI-PRIVATE ROOM

## 2018-05-31 PROCEDURE — 99232 SBSQ HOSP IP/OBS MODERATE 35: CPT | Mod: ,,, | Performed by: INTERNAL MEDICINE

## 2018-05-31 PROCEDURE — 63600175 PHARM REV CODE 636 W HCPCS: Performed by: SURGERY

## 2018-05-31 PROCEDURE — 25000003 PHARM REV CODE 250: Performed by: NURSE PRACTITIONER

## 2018-05-31 PROCEDURE — 80048 BASIC METABOLIC PNL TOTAL CA: CPT

## 2018-05-31 RX ORDER — METOCLOPRAMIDE HYDROCHLORIDE 5 MG/ML
INJECTION INTRAMUSCULAR; INTRAVENOUS
Status: COMPLETED
Start: 2018-05-31 | End: 2018-05-31

## 2018-05-31 RX ADMIN — MUPIROCIN 1 G: 20 OINTMENT TOPICAL at 08:05

## 2018-05-31 RX ADMIN — ROPINIROLE HYDROCHLORIDE 0.5 MG: 0.25 TABLET, FILM COATED ORAL at 08:05

## 2018-05-31 RX ADMIN — TAMSULOSIN HYDROCHLORIDE 0.4 MG: 0.4 CAPSULE ORAL at 08:05

## 2018-05-31 RX ADMIN — BISOPROLOL FUMARATE 5 MG: 5 TABLET, COATED ORAL at 08:05

## 2018-05-31 RX ADMIN — METOCLOPRAMIDE 5 MG: 5 INJECTION, SOLUTION INTRAMUSCULAR; INTRAVENOUS at 08:05

## 2018-05-31 RX ADMIN — DIGOXIN 0.25 MG: 125 TABLET ORAL at 08:05

## 2018-05-31 RX ADMIN — HYDRALAZINE HYDROCHLORIDE 25 MG: 25 TABLET, FILM COATED ORAL at 08:05

## 2018-05-31 RX ADMIN — PANTOPRAZOLE SODIUM 40 MG: 40 INJECTION, POWDER, LYOPHILIZED, FOR SOLUTION INTRAVENOUS at 05:05

## 2018-05-31 RX ADMIN — RIVAROXABAN 15 MG: 15 TABLET, FILM COATED ORAL at 08:05

## 2018-05-31 RX ADMIN — ALUMINUM HYDROXIDE, MAGNESIUM HYDROXIDE, AND SIMETHICONE 30 ML: 200; 200; 20 SUSPENSION ORAL at 08:05

## 2018-05-31 RX ADMIN — LORAZEPAM 0.25 MG: 2 INJECTION, SOLUTION INTRAMUSCULAR; INTRAVENOUS at 10:05

## 2018-05-31 NOTE — PROGRESS NOTES
POD 3 s/p proctectomy with colostomy  PT resting comfortably.  Has had ostomy output.  No n/v.      Wt Readings from Last 3 Encounters:   05/30/18 83.6 kg (184 lb 4.9 oz)   05/24/18 79.8 kg (176 lb)   05/18/18 79.8 kg (176 lb)     Temp Readings from Last 3 Encounters:   05/31/18 97.2 °F (36.2 °C)   05/03/18 97.6 °F (36.4 °C) (Skin)   04/17/18 97.7 °F (36.5 °C) (Oral)     BP Readings from Last 3 Encounters:   05/31/18 (!) 140/80   05/18/18 121/75   05/11/18 132/65     Pulse Readings from Last 3 Encounters:   05/31/18 93   05/18/18 86   05/11/18 83     AAox3  Soft/nt/nd BS present  2+ pulses B      Lab Results   Component Value Date    WBC 10.30 05/31/2018    HGB 14.2 05/31/2018    HCT 41.2 05/31/2018    MCV 97 05/31/2018     05/31/2018     BMP  Lab Results   Component Value Date     05/31/2018    K 4.1 05/31/2018     05/31/2018    CO2 30 (H) 05/31/2018    BUN 9 05/31/2018    CREATININE 0.7 05/31/2018    CALCIUM 8.9 05/31/2018    ANIONGAP 4 (L) 05/31/2018    ESTGFRAFRICA >60 05/31/2018    EGFRNONAA >60 05/31/2018     A/P: POD 3 s/p proctectomy with colostomy  Ambulate  Adv diet as emma  D/c to rehab per dr knutson

## 2018-05-31 NOTE — PLAN OF CARE
Problem: Patient Care Overview  Goal: Plan of Care Review  Outcome: Ongoing (interventions implemented as appropriate)  Pt alert and oriented with periods of confusion. Wife at bedside. Uses urinal. Up to chair with assistance. No new symptoms. Minimal output in colostomy bag. Plan of care reviewed. Denies pain. Will continue to monitor.

## 2018-05-31 NOTE — PLAN OF CARE
I sent chest xray and PPD to both [facility] and [facility] in [place] via [employer]. I will call to get updates on the pts acceptance . Reny Barrett LMSW     8:40- I completed the LOCET assessment with [facility] at [phone] opt 3. [facility] and [phone]. I will follow up with obtaining the pts 142 via email. Reny Barrett LMSW     11:20- I received the pts approved 142 via email. Placed original in the pts blue folder. Per Aranza at [facility] in [place] [phone]- they received the referral however they currently do not have any beds. I left a message for Bette in admissions at [facility] [phone] to return my call regarding pts acceptance .  Reny Barrett LMSW     12:07- Bette returned my call and stated that the pts record is with her DON for review. She will call me back with an update as soon as she knows something. Reny Barrett LMSW            05/31/18 0831   Discharge Reassessment   Assessment Type Discharge Planning Reassessment

## 2018-05-31 NOTE — PLAN OF CARE
05/30/18 2035   Patient Assessment/Suction   Level of Consciousness (AVPU) alert   PRE-TX-O2-ETCO2   O2 Device (Oxygen Therapy) room air   SpO2 96 %   Pulse Oximetry Type Intermittent   Incentive Spirometer   $ Incentive Spirometer Charges done with encouragement   Administration (Incentive Spirometer) done with encouragement   Number of Repetitions (Incentive Spirometer) 10   Level (mL) (Incentive Spirometer) 2000   Patient Tolerance good

## 2018-05-31 NOTE — PROGRESS NOTES
Completion of Ostomy Education with patient and wife, Kassy. Wife reported she will be the care giver for her  until he feels comfortable with the newly created colostomy. She has demonstrated very good understanding of pouch changing by return demonstration. She has not hesitated asking to do all of the changing herself.     Explained and demonstrated the process of emptying the pouch. The pouch had less than 25 ml of red to dark brown liquid bowel sweat, but the wife was able to  empty the pouch while her  was sitting in the bedside chair, with no difficulty. She then removed the pouch using adhesive remover, clean the stoma and isabel-stoma area with a warm wet wash cloth, dry, then apply skin barrier protectant. She cut the opening of a different pouch from the standard flat hospital pouch, and applied over the stoma. Good seal was obtained.    Wife and patient understand the pouch needs to be emptied when 1/3 to 1/2 full, and to change the drainable pouch every 2-3 days or sooner, if the seal is no longer holding.    Patient was given the following different pouches to try:  1. Millbrae flat floating flange - # [id number]  2. Millbrae closed End Pouch - # [id number]  3. Poli Drainable pouch - # [id number]  4. Poli New Image flange - # [id number]  5. Poli Drainable pouch - # [id number]  6. Poli Flat drainable pouch - # 8901  7. Poli Convex Drainable pouch - # 8958  8. Convatec Closed End pouch - #[id number]  9. Coloplast SenSura Horicon - Convex pouch    Poli to send another sample Sure Start Starter Kit with several different pouches - drainable and closed end. The wife feels the closed end pouches will be easier to take care of. Patient has enough pouches for 2-3 weeks, plus [employer]opslast has sent sample pouches. The wife and I went through the samples to make sure she understood what was in the order.     Patient also has alcohol free adhesive remover and alcohol skin barrier protectant, stoma  guides, moldable rings, barrier strips, and lubricant deodorant.     Patient has been given contact information for all three manufactures of products (Coloplast, Poli, Convatec) and for DMEs ([employer], [employer], and [employer]). Additionally, patient has my contact number.    Patient to be going to rehab upon discharge. Next week, I will contact the patient to see which pouch(es) they are interested in using, the [facility].    Stoma today was round, budded, moist. Black sutures still noted as in place. Measured 32 mm. No pain noted. Steri-strips remained dry and intact.    All questions answered and wife feels comfortable with doing the pouch changes.    Wilma Harper RN, CWOCN

## 2018-05-31 NOTE — PLAN OF CARE
Problem: Physical Therapy Goal  Goal: Physical Therapy Goal  Goals to be met by: 2018     Patient will increase functional independence with mobility by performin. Supine to sit with MInimal Assistance  2. Sit to stand transfer with Contact Guard Assistance  3. Bed to chair transfer with Contact Guard Assistance using Rolling Walker  4. Gait  x 250 feet with Minimal Assistance using Rolling Walker./cane   5. Lower extremity exercise program x20 reps per handout, with assistance as needed     Outcome: Ongoing (interventions implemented as appropriate)  PT for functional mobility, gait training and therex

## 2018-05-31 NOTE — PT/OT/SLP PROGRESS
"Physical Therapy Treatment    Patient Name:  Fabian Shine   MRN:  [id number]    Recommendations:     Discharge Recommendations:  home with home health, home health PT, home health OT       Assessment:     Fabian Shine is a 86 y.o. male admitted with a medical diagnosis of Rectal cancer.  He presents with the following impairments/functional limitations:  weakness, impaired endurance, impaired self care skills, impaired functional mobilty, gait instability, impaired balance, decreased coordination, decreased upper extremity function, decreased lower extremity function, decreased safety awareness, pain, decreased ROM, impaired joint extensibility . Pt continues to require increased cueing and assistance for safety.    Rehab Prognosis:  fair; patient would benefit from acute skilled PT services to address these deficits and reach maximum level of function.      Recent Surgery: Procedure(s) (LRB):  COLOSTOMY (N/A)  PROCTECTOMY (N/A) 3 Days Post-Op    Plan:     During this hospitalization, patient to be seen daily to address the above listed problems via gait training, therapeutic activities, therapeutic exercises  · Plan of Care Expires:  06/08/18   Plan of Care Reviewed with: patient    Subjective     Communicated with nurse Membreno prior to session.  Patient found seated in chair upon PT entry to room, agreeable to treatment.      Chief Complaint: faituge  Patient comments/goals: pt agreeable to gait training and therex  Pain/Comfort:  · Pain Rating 1:  ("a little")  · Location - Orientation 1: generalized  · Location 1: abdomen  · Pain Addressed 1: Reposition, Distraction    Patients cultural, spiritual, Buddhism conflicts given the current situation:      Objective:     Patient found with: colostomy, telemetry, peripheral IV     General Precautions: Standard, fall   Orthopedic Precautions:N/A   Braces: N/A     Functional Mobility:    · Transfers:     · Sit to Stand:  moderate assistance and of 2 for safety with " rolling walker and cueing for tech (2x's total)  · Bed to Chair: stand-pivot with mod A of 2 for safety using RW. Cueing for tech and safety. Pt able to take a few steps to chair.     · Gait: 60' with max A of 2 using RW. cueing for tech, safety, upright posture and to remain close to RW. Pt with increased instability, excessive knee flexion and forward flexed posture. Chair following for safety          Therapeutic Activities and Exercises:   pt assisted to chair following gait      Seated therex: AP, heel slides, SLR, hip abd/add x  10 reps with AAROM for full range and tech    Patient left up in chair with all lines intact, call button in reach and nurse notified..    GOALS:    Physical Therapy Goals        Problem: Physical Therapy Goal    Goal Priority Disciplines Outcome Goal Variances Interventions   Physical Therapy Goal     PT/OT, PT Ongoing (interventions implemented as appropriate)     Description:  Goals to be met by: 2018     Patient will increase functional independence with mobility by performin. Supine to sit with MInimal Assistance  2. Sit to stand transfer with Contact Guard Assistance  3. Bed to chair transfer with Contact Guard Assistance using Rolling Walker  4. Gait  x 250 feet with Minimal Assistance using Rolling Walker./cane   5. Lower extremity exercise program x20 reps per handout, with assistance as needed                      Time Tracking:     PT Received On: 18  PT Start Time: 930     PT Stop Time: 955  PT Total Time (min): 25 min     Billable Minutes: Gait Training 15 and Therapeutic Activity 10    Treatment Type: Treatment  PT/PTA: PTA     PTA Visit Number: 2     Nanette Tadeo, AKE  2018

## 2018-05-31 NOTE — PROGRESS NOTES
Progress Note  [facility] Medicine  Patient Name:Fabian Shine  MRN:  [id number]  Patient Class: IP- Inpatient  Admit Date: 5/28/2018  Length of Stay: 3 days  Expected Discharge Date:   Attending Physician: Willis Huang MD  Primary Care Provider:  Trey Hanks DO    SUBJECTIVE:     Principal Problem: Rectal cancer  Initial history of present illness: Patient is a 86 y.o. male s/p Partial proctectomy with end colostomy by Dr. ham Patient has PMH significant for Chronic atrial fibrillation, history of urinary bladder tumor, PAD, hypertension, RLS and supraventricular tachycardia. Post-operatively, patient is doing well. Patient denied chest pain, shortness of breath, headache, vision changes, focal neuro-deficits, cough or fever. Patient is complaining of abdominal pain at the surgery site.    PMH/PSH/SH/FH/Meds: reviewed.    Symptoms/Review of Systems: Patient working with PT. Liquid green stool in ostomy. Patient is frail and weak. Patient had some confusion last night. No shortness of breath, cough, chest pain or headache, fever or abdominal pain.     Diet: Full liquids  Activity level: Up with assistance  Pain:  Patient reports no pain.       OBJECTIVE:   Vital Signs (Most Recent):      Temp: 97.9 °F (36.6 °C) (05/31/18 0724)  Pulse: 87 (05/31/18 0724)  Resp: 18 (05/31/18 0724)  BP: (!) 158/76 (05/31/18 0724)  SpO2: (!) 93 % (05/31/18 0724)       Vital Signs Range (Last 24H):  Temp:  [96.5 °F (35.8 °C)-98.5 °F (36.9 °C)]   Pulse:  []   Resp:  [16-20]   BP: (123-158)/(63-88)   SpO2:  [93 %-97 %]     Weight: 83.6 kg (184 lb 4.9 oz)  Body mass index is 25 kg/m².    Intake/Output Summary (Last 24 hours) at 05/31/18 0855  Last data filed at 05/31/18 0600   Gross per 24 hour   Intake             2400 ml   Output              900 ml   Net             1500 ml     Physical Examination:  General appearance: well developed, appears stated age  Head: normocephalic, atraumatic  Eyes:  conjunctivae/corneas clear.  PERRL.  Nose: Nares normal. Septum midline.  Throat: lips, mucosa, and tongue normal; teeth and gums normal, no throat erythema.  Neck: supple, symmetrical, trachea midline, no JVD and thyroid not enlarged, symmetric, no tenderness/mass/nodules  Lungs:  clear to auscultation bilaterally and normal respiratory effort  Chest wall: no tenderness  Heart: regular rate and rhythm, S1, S2 normal, no murmur, click, rub or gallop  Abdomen: soft, non-tender non-distented; bowel sounds normal; no masses,  no organomegaly. Abdominal dressing C/D/I. Left paramedian colostomy looks healthy. Bag is still empty.  Extremities: no cyanosis, clubbing or edema.   Pulses: 2+ and symmetric  Skin: Skin color, texture, turgor normal. No rashes or lesions.  Lymph nodes: Cervical, supraclavicular, and axillary nodes normal.  Neurologic: Normal strength and tone. No focal numbness or weakness. CNII-XII intact.      CBC:    Recent Labs  Lab 05/29/18  0440 05/30/18  0409 05/31/18  0454   WBC 11.90 10.80 10.30   RBC 4.53* 4.30* 4.26*   HGB 14.9 14.3 14.2   HCT 43.9 41.4 41.2    146* 159   MCV 97 96 97   MCH 33.0* 33.3* 33.4*   MCHC 34.0 34.6 34.5   BMP    Recent Labs  Lab 05/29/18  0440 05/30/18  0409 05/31/18  0454   * 133* 141*   * 137 136   K 4.2 3.9 4.1    102 102   CO2 26 28 30*   BUN 11 14 9   CREATININE 0.9 0.9 0.7   CALCIUM 8.7 9.2 8.9      Diagnostic Results:  Microbiology Results (last 7 days)     ** No results found for the last 168 hours. **         CXR: No acute radiographic findings in the chest.    Assessment/Plan:      *Rectal cancer s/p Partial proctectomy with end colostomy. [C20]  Continue to follow Reina recommendations. Advance to soft diet, discussed with Dr. Huang.  Needs aggressive incentive spirometry.  Follow hemoglobin and hematocrit closely.  Pain control with PO narcotics and antiemetics as needed.      Yes    Chronic atrial fibrillation [I48.2]  Tele-monitoring.  Resume xarelto  today.  Continue PO Digoxin and Bisoprolol.      Yes    History of iron deficiency anemia [Z86.2]  Follow CBC and transfuse as needed.      Not Applicable    Benign prostatic hyperplasia with urinary obstruction [N40.1, N13.8]  Continue Flomax.   Yes     Await SNF placement. Discussed with Dr. Huang and his wife.     VTE Risk Mitigation         Ordered     rivaroxaban tablet 15 mg  Daily      05/30/18 0851        Jayden Reed MD  Department of Hospital Medicine   [facility]

## 2018-05-31 NOTE — PT/OT/SLP PROGRESS
"Occupational Therapy      Patient Name:  Fabian Shine   MRN:  [id number]    Patient not seen today secondary to Patient fatigue. Pt stated "I'm a bit worn out. Come back tomorrow." Will follow-up when schedule permits and pt is appropriate.    Harsh Nicole, OT  5/31/2018  "

## 2018-05-31 NOTE — PROGRESS NOTES
I left a message for Nitra in admissions at [facility] at [phone] to update me on the pts acceptance. Reny Barrett LMSW

## 2018-05-31 NOTE — PLAN OF CARE
Problem: Patient Care Overview  Goal: Plan of Care Review  Outcome: Revised  Pt is awake and alert, confusion intermittent, reoriented throughout shift.  IVF infusing, no redness or swelling at site.  Cardiac monitoring in place.  Colostomy monitored, emptied as needed.  Wife remains at bedside.  VSS, in NAD, pt remains afebrile.  Pt remains free from injury.  Bed in low position, wheels locked, call light within reach.  Pt verbalized understanding of POC.  Will continue to monitor.

## 2018-06-01 ENCOUNTER — TELEPHONE (OUTPATIENT)
Dept: SURGERY | Facility: CLINIC | Age: 83
End: 2018-06-01

## 2018-06-01 VITALS
BODY MASS INDEX: 24.96 KG/M2 | SYSTOLIC BLOOD PRESSURE: 110 MMHG | WEIGHT: 184.31 LBS | RESPIRATION RATE: 18 BRPM | HEIGHT: 72 IN | TEMPERATURE: 98 F | OXYGEN SATURATION: 96 % | DIASTOLIC BLOOD PRESSURE: 70 MMHG | HEART RATE: 96 BPM

## 2018-06-01 LAB — TB INDURATION 48 - 72 HR READ: 0 MM

## 2018-06-01 PROCEDURE — 94761 N-INVAS EAR/PLS OXIMETRY MLT: CPT

## 2018-06-01 PROCEDURE — 63600175 PHARM REV CODE 636 W HCPCS: Performed by: SURGERY

## 2018-06-01 PROCEDURE — 97530 THERAPEUTIC ACTIVITIES: CPT

## 2018-06-01 PROCEDURE — 25000003 PHARM REV CODE 250: Performed by: INTERNAL MEDICINE

## 2018-06-01 PROCEDURE — 99239 HOSP IP/OBS DSCHRG MGMT >30: CPT | Mod: ,,, | Performed by: INTERNAL MEDICINE

## 2018-06-01 PROCEDURE — 97116 GAIT TRAINING THERAPY: CPT

## 2018-06-01 PROCEDURE — 94799 UNLISTED PULMONARY SVC/PX: CPT

## 2018-06-01 PROCEDURE — 25000003 PHARM REV CODE 250: Performed by: SURGERY

## 2018-06-01 PROCEDURE — C9113 INJ PANTOPRAZOLE SODIUM, VIA: HCPCS | Performed by: SURGERY

## 2018-06-01 RX ORDER — METOCLOPRAMIDE HYDROCHLORIDE 5 MG/ML
5 INJECTION INTRAMUSCULAR; INTRAVENOUS EVERY 6 HOURS PRN
Status: DISCONTINUED | OUTPATIENT
Start: 2018-06-01 | End: 2018-06-01

## 2018-06-01 RX ORDER — HYDROCODONE BITARTRATE AND ACETAMINOPHEN 5; 325 MG/1; MG/1
1 TABLET ORAL EVERY 6 HOURS PRN
Qty: 12 TABLET | Refills: 0 | Status: SHIPPED | OUTPATIENT
Start: 2018-06-01 | End: 2018-08-30 | Stop reason: SDUPTHER

## 2018-06-01 RX ORDER — METOCLOPRAMIDE HYDROCHLORIDE 5 MG/ML
5 INJECTION INTRAMUSCULAR; INTRAVENOUS EVERY 6 HOURS PRN
Status: DISCONTINUED | OUTPATIENT
Start: 2018-06-01 | End: 2018-06-01 | Stop reason: HOSPADM

## 2018-06-01 RX ADMIN — MUPIROCIN 1 G: 20 OINTMENT TOPICAL at 12:06

## 2018-06-01 RX ADMIN — BISOPROLOL FUMARATE 5 MG: 5 TABLET, COATED ORAL at 11:06

## 2018-06-01 RX ADMIN — RIVAROXABAN 15 MG: 15 TABLET, FILM COATED ORAL at 12:06

## 2018-06-01 RX ADMIN — DEXTROSE, SODIUM CHLORIDE, SODIUM LACTATE, POTASSIUM CHLORIDE, AND CALCIUM CHLORIDE: 5; .6; .31; .03; .02 INJECTION, SOLUTION INTRAVENOUS at 01:06

## 2018-06-01 RX ADMIN — METOCLOPRAMIDE 5 MG: 5 INJECTION, SOLUTION INTRAMUSCULAR; INTRAVENOUS at 02:06

## 2018-06-01 RX ADMIN — PANTOPRAZOLE SODIUM 40 MG: 40 INJECTION, POWDER, LYOPHILIZED, FOR SOLUTION INTRAVENOUS at 05:06

## 2018-06-01 RX ADMIN — TAMSULOSIN HYDROCHLORIDE 0.4 MG: 0.4 CAPSULE ORAL at 11:06

## 2018-06-01 RX ADMIN — DIGOXIN 0.25 MG: 125 TABLET ORAL at 11:06

## 2018-06-01 NOTE — PLAN OF CARE
Problem: Physical Therapy Goal  Goal: Physical Therapy Goal  Goals to be met by: 2018     Patient will increase functional independence with mobility by performin. Supine to sit with MInimal Assistance  2. Sit to stand transfer with Contact Guard Assistance  3. Bed to chair transfer with Contact Guard Assistance using Rolling Walker  4. Gait  x 250 feet with Minimal Assistance using Rolling Walker./cane   5. Lower extremity exercise program x20 reps per handout, with assistance as needed     Outcome: Ongoing (interventions implemented as appropriate)  PT functional mobility, transfer and gait training.

## 2018-06-01 NOTE — NURSING
"Old blood noted to tim pad and PT states "when I was ambulating pt blood was running down is leg, body assessment perform, blood coming from urine  dr knutson and dr espinosa made aware. Dr knutson states "it old blood from surgery it will resolved itself" Dr knutson ok to give xarelto    "

## 2018-06-01 NOTE — PROGRESS NOTES
I sent discharge orders and AVS to [facility] via [facility]. I faxed the prescription to [phone]. I updated Nitra at [phone]. She confirmed that the wife did come to complete paperwork. She is going to call her  now to see if they can provide transport.  Reny Barrett LMSW    I faxed hard script of Hydrocodone to [phone] and placed the original in the pts blue folder. Reny Barrett LMSW     Report can be called to [facility] in about 15 minutes [phone] and they are going to transport the pt. I updated the pts nurse, Teressa. Reny Barrett LMSW

## 2018-06-01 NOTE — PROGRESS NOTES
I left a message for Bette with [facility] in [place] [phone], she is in morning meeting and will call me back. Reny Barrett LMSW     I spoke to the pts wife, Kassy at [phone] to ask for additional choices. I informed her that [facility] is the last one located in the [facility] and then [facility]. She does not know where these locations are and asked about [facility]. I explained the difference in SNF and Inpt rehab. She stated that she does not want to proceed with the other facilities until we have a denial from [facility] in [place] . I told her that I would call her back once [facility] returns my call. Reny Barrett LMSW     9:48- Per Bette- they are able to accept the pt. She is going to have the insurance ran and she will call me back. I updated her that discharge would be for today.   I faxed the pts PPD, 142, PASRR, and chest xray to [phone]. I updated the pts wife Kassy at [phone] regarding the pts acceptance at [facility] and she has questions about the pts medications. I told her that she could speak to Dr. Reed when he makes rounds in about an hour. Reny Barrett LMSW     10:47 am- I received a message from Bette that she checked the pts benefits and his medicare will pay for 20 days of skilled services. She spoke to the pts wife and she is coming over to do paperwork. I requested orders from Dr. Reed.  Reny Barrett LMSW

## 2018-06-01 NOTE — PLAN OF CARE
06/01/18 0809   Patient Assessment/Suction   Level of Consciousness (AVPU) alert   Respiratory Effort Unlabored;Normal   Expansion/Accessory Muscles/Retractions no retractions;no use of accessory muscles   All Lung Fields Breath Sounds clear   Rhythm/Pattern, Respiratory unlabored;pattern regular;depth regular   PRE-TX-O2-ETCO2   O2 Device (Oxygen Therapy) room air   SpO2 95 %   Pulse Oximetry Type Intermittent   $ Pulse Oximetry - Multiple Charge Pulse Oximetry - Multiple   Incentive Spirometer   $ Incentive Spirometer Charges done with encouragement   Predicted Level (mL) (Incentive Spirometer) 2200   Administration (Incentive Spirometer) done with encouragement   Number of Repetitions (Incentive Spirometer) 10   Level (mL) (Incentive Spirometer) 2000   Patient Tolerance good

## 2018-06-01 NOTE — PLAN OF CARE
Problem: Patient Care Overview  Goal: Plan of Care Review  Outcome: Revised  Pt is awake and alert, reoriented throughout shift.  Cardiac monitoring in place.  IVF infusing, no redness or swelling at site.  Colostomy to LUQ maintained.  VSS, in NAD, pt remains afebrile.  Pt remains free from injury.  Bed in low position, wheels locked, call light within reach.  Pt verbalized understanding of POC.  Will continue to monitor.

## 2018-06-01 NOTE — TELEPHONE ENCOUNTER
----- Message from Sonia Luu sent at 6/1/2018  4:06 PM CDT -----  Contact: Kassy Shine (Spouse)  Type: Needs Medical Advice    Who Called: Kassy Shine (Spouse)  Symptoms (please be specific): post op - colon (cancer) surgery  How long has patient had these symptoms: NA  Pharmacy name and phone #: NA  Best Call Back Number:   Additional Information: Calling to schedule a post op in 2 weeks. The first avail appt on 6/20/18 and Kassy Shine (Spouse) said it was too far out. Please advise.

## 2018-06-01 NOTE — PT/OT/SLP PROGRESS
Physical Therapy Treatment    Patient Name:  Fabian Shine   MRN:  [id number]    Recommendations:     Discharge Recommendations:  home with home health, home health PT, home health OT       Assessment:     Fabian Shine is a 86 y.o. male admitted with a medical diagnosis of Rectal cancer.  He presents with the following impairments/functional limitations:  weakness, impaired endurance, impaired self care skills, impaired functional mobilty, gait instability, impaired balance, decreased upper extremity function, decreased lower extremity function, decreased safety awareness, pain, decreased ROM, impaired joint extensibility .    Rehab Prognosis:  fair; patient would benefit from acute skilled PT services to address these deficits and reach maximum level of function.      Recent Surgery: Procedure(s) (LRB):  COLOSTOMY (N/A)  PROCTECTOMY (N/A) 4 Days Post-Op    Plan:     During this hospitalization, patient to be seen daily to address the above listed problems via gait training, therapeutic activities, therapeutic exercises  · Plan of Care Expires:  06/08/18   Plan of Care Reviewed with: patient    Subjective     Communicated with nurse House prior to session.  Patient found seated in chair upon PT entry to room, agreeable to treatment.      Chief Complaint: weakness  Patient comments/goals: pt eager to mobilize  Pain/Comfort:  · Pain Rating 1: 0/10    Patients cultural, spiritual, L[employer]-day conflicts given the current situation:      Objective:     Patient found with: colostomy, telemetry, peripheral IV     General Precautions: Standard, fall   Orthopedic Precautions:N/A   Braces: N/A     Functional Mobility:  · Transfers:     · Sit to Stand:  moderate-maximal assistance and of 2 for safety with rolling walker and cueing for tech (2x's total)     · Gait: 60' with max A of 2 using RW. cueing for tech, safety, upright posture and to remain close to RW. Pt with increased instability, excessive knee flexion and forward  flexed posture. Chair following for safety           Therapeutic Activities and Exercises:  Upon initial gait attempt, pt ambulated 6' and reported immediate urge to urinate. Pt with urinary incontinence in standing. Pt assisted back to chair and assisted with donning fresh socks, guillermina hose, gown and diaper.    Pt assisted to standing, stood with mod A at RW while being assisted with donning of diaper. Charge nurse, Sumaya, present to assist.    pt assisted to chair following gait      Patient left up in chair with all lines intact, call button in reach and nurse notified..    GOALS:    Physical Therapy Goals        Problem: Physical Therapy Goal    Goal Priority Disciplines Outcome Goal Variances Interventions   Physical Therapy Goal     PT/OT, PT Ongoing (interventions implemented as appropriate)     Description:  Goals to be met by: 2018     Patient will increase functional independence with mobility by performin. Supine to sit with MInimal Assistance  2. Sit to stand transfer with Contact Guard Assistance  3. Bed to chair transfer with Contact Guard Assistance using Rolling Walker  4. Gait  x 250 feet with Minimal Assistance using Rolling Walker./cane   5. Lower extremity exercise program x20 reps per handout, with assistance as needed                      Time Tracking:     PT Received On: 18  PT Start Time: 1010     PT Stop Time: 1040  PT Total Time (min): 30 min     Billable Minutes: Gait Training 12 and Therapeutic Activity 12    Treatment Type: Treatment  PT/PTA: PTA     PTA Visit Number: 3     Nanette Tadeo, KAE  2018

## 2018-06-01 NOTE — TELEPHONE ENCOUNTER
I spoke to patients wife and scheduled him on Friday, 6/8/18 at 10am for a post op colectomy.  Ellis

## 2018-06-01 NOTE — DISCHARGE SUMMARY
Discharge Summary  Hospital Medicine    Admit Date: 5/28/2018    Date and Time: 6/1/20181:25 PM    Discharge Attending Physician: Jayden Reed MD    Primary Care Physician: Trey Hanks DO    Diagnoses:  Active Hospital Problems    Diagnosis  POA    *Rectal cancer s/p Partial proctectomy with end colostomy. [C20]  Yes    Chronic atrial fibrillation [I48.2]  Yes    History of iron deficiency anemia [Z86.2]  Not Applicable    Benign prostatic hyperplasia with urinary obstruction [N40.1, N13.8]  Yes      Resolved Hospital Problems    Diagnosis Date Resolved POA   No resolved problems to display.     Discharged Condition: Good    Hospital Course:   Patient is a 86 y.o. male s/p Partial proctectomy with end colostomy by Dr. Huang. Patient has PMH significant for Chronic atrial fibrillation, history of urinary bladder tumor, PAD, hypertension, RLS and supraventricular tachycardia. Post-operatively, patient is doing well. Patient denied chest pain, shortness of breath, headache, vision changes, focal neuro-deficits, cough or fever. Patient is complaining of abdominal pain at the surgery site. Patient was followed by hospital medicine. Patient was slow to work ith PT and appeared deconditioned. Bowel functions resumed. Patient tolerating diet. Ostomy teaching done. Patient was discharged to SNF in stable condition with following discharge plan of care. Total time with the patient was 30 minutes and greater than 50% was spent in counseling and coordination of care. The assessment and plan have been discussed at length. Physicians' notes reviewed. Labs and procedure reviewed.     Consults: Dr. Reed    Significant Diagnostic Studies:   CXR: No acute radiographic findings in the chest.     CXR:  Bibasilar atelectasis. Free intraperitoneal air beneath the right hemidiaphragm most likely on a postoperative basis although recommend correlation clinically.       Microbiology Results (last 7 days)     ** No results found  for the last 168 hours. **        Special Treatments/Procedures: as above  Disposition: SNF    Medications:  Reconciled Home Medications: Current Discharge Medication List      CONTINUE these medications which have CHANGED    Details   HYDROcodone-acetaminophen (NORCO) 5-325 mg per tablet Take 1 tablet by mouth every 6 (six) hours as needed for Pain.  Qty: 12 tablet, Refills: 0         CONTINUE these medications which have NOT CHANGED    Details   bisoprolol (ZEBETA) 5 MG tablet Take 1 tablet (5 mg total) by mouth once daily.  Qty: 90 tablet, Refills: 1    Associated Diagnoses: Essential hypertension      digoxin (DIGOX) 250 mcg tablet Take 1 tablet (0.25 mg total) by mouth once daily.  Qty: 90 tablet, Refills: 1      FERREX 150 150 mg iron Cap Cap 1 po daily except Sunday.  Qty: 73 capsule, Refills: 1      krgqmkeud-L0-[id number]-algal oil (METANX, ALGAL OIL,) 3 mg-35 mg-2 mg -90.314 mg Cap Take 1 capsule by mouth once daily.  Qty: 90 capsule, Refills: 3      pentoxifylline (TRENTAL) 400 mg TbSR Take 1 tablet (400 mg total) by mouth 3 (three) times daily with meals.  Qty: 270 tablet, Refills: 1    Associated Diagnoses: Decreased circulation      pravastatin (PRAVACHOL) 10 MG tablet Take 1 tablet (10 mg total) by mouth once daily.  Qty: 90 tablet, Refills: 1    Associated Diagnoses: Dyslipidemia      psyllium 0.52 gram capsule Take 0.52 g by mouth once daily.      rOPINIRole (REQUIP) 1 MG tablet Tab 1 po at HS.  Qty: 90 tablet, Refills: 1      triamcinolone acetonide 0.1% (KENALOG) 0.1 % cream Apply topically 2 (two) times daily. Avoid face, axilla, groin      tamsulosin (FLOMAX) 0.4 mg Cp24 Take 1 capsule (0.4 mg total) by mouth once daily.  Qty: 90 capsule, Refills: 1    Associated Diagnoses: Benign non-nodular prostatic hyperplasia without lower urinary tract symptoms      XARELTO 15 mg Tab TAKE 1 TABLET BY MOUTH ONCE A DAY  Qty: 90 tablet, Refills: 4    Associated Diagnoses: Decreased circulation              Discharge Procedure Orders  Diet Cardiac     Activity as tolerated   Order Comments: Observe fall precautions.     Call MD for:   Order Comments: For worsening symptoms, chest pain, shortness of breath, increased abdominal pain, high grade fever, stroke or stroke like symptoms, immediately go to the nearest Emergency Room or call 911 as soon as possible.       Follow-up Information     [facility].    Why:  SNF, Nursing Home  Contact information:  [address]  [facility] [address]           Trey Hanks DO In 2 weeks.    Specialty:  Family Medicine  Contact information:  [address]  [facility] [address] [phone]             Willis Huang MD In 2 weeks.    Specialties:  General Surgery, Colon and Rectal Surgery, Surgery  Contact information:  [address]  [facility] [address] [phone]

## 2018-06-01 NOTE — PROGRESS NOTES
05/31/18 2015   Incentive Spirometer   $ Incentive Spirometer Charges done with encouragement   Administration (Incentive Spirometer) done independently per patient;ready for self-administration;proper technique demonstrated;breath hold utilized   Number of Repetitions (Incentive Spirometer) 10   Level (mL) (Incentive Spirometer) 2000   Patient Tolerance good

## 2018-06-01 NOTE — DISCHARGE INSTRUCTIONS
[facility]     Dx-   Patient Active Problem List   Diagnosis    Benign prostatic hyperplasia with urinary obstruction    Restless leg syndrome, controlled    Benign non-nodular prostatic hyperplasia without lower urinary tract symptoms    History of iron deficiency anemia    PAD (peripheral artery disease)    Peripheral vascular disease, unspecified    Hemorrhoids    Mass in rectum    Preop cardiovascular exam    Chronic atrial fibrillation    Rectal cancer s/p Partial proctectomy with end colostomy.     Diet- Mechanical soft     PT/OT eval and treat 5xs weekly  Fall Precautions   Turn q2 while in bed  Up to chair with all meals     Colostomy care and teaching

## 2018-06-01 NOTE — PLAN OF CARE
Problem: Patient Care Overview  Goal: Plan of Care Review  Outcome: Ongoing (interventions implemented as appropriate)  Report called to [facility] at [facility], pt tolerated removal of IV and telemetry, pt denies joint pain/discomfort at this time, afib rhythm on telemetry, poor appetite, pt consumes less than 25% on all meals, pt exhibits short term memory loss AEB pt unable to recall recent events, [facility] transport is here to retrieve pt, safety maintain

## 2018-06-02 NOTE — PLAN OF CARE
06/02/18 1252   Final Note   Assessment Type Final Discharge Note   Discharge Disposition SNF

## 2018-06-07 ENCOUNTER — TELEPHONE (OUTPATIENT)
Dept: SURGERY | Facility: CLINIC | Age: 83
End: 2018-06-07

## 2018-06-07 PROBLEM — K62.5 RECTAL BLEEDING: Status: ACTIVE | Noted: 2018-06-07

## 2018-06-07 PROBLEM — N39.0 URINARY TRACT INFECTION WITH HEMATURIA: Status: ACTIVE | Noted: 2018-06-07

## 2018-06-07 PROBLEM — R31.9 URINARY TRACT INFECTION WITH HEMATURIA: Status: ACTIVE | Noted: 2018-06-07

## 2018-06-07 NOTE — TELEPHONE ENCOUNTER
----- Message from Nimo Nicole sent at 6/7/2018 10:46 AM CDT -----  Contact: Kassy Fátima (Spouse)  Name of Who is Calling: Kassy      What is the request in detail: Kassy is calling states she needs to speak with Geovanny MEJIA, due to pt being sent into ER in [facility] the clinic reply by [facility]: no      What Number to Call Back if not in [facility]: [phone]

## 2018-06-07 NOTE — TELEPHONE ENCOUNTER
----- Message from Yolanda Abdi sent at 6/7/2018  2:48 PM CDT -----  Contact: spouse sal [phone]  spouse sal [phone]  Called stated the hospital will keep the patient and need the appt canceled that's on 6/8/18

## 2018-06-08 PROBLEM — K62.5 RECTAL BLEEDING: Status: ACTIVE | Noted: 2018-06-08

## 2018-06-11 ENCOUNTER — TELEPHONE (OUTPATIENT)
Dept: SURGERY | Facility: CLINIC | Age: 83
End: 2018-06-11

## 2018-06-11 NOTE — TELEPHONE ENCOUNTER
----- Message from Alden Nice sent at 6/11/2018  8:10 AM CDT -----  Contact: Wife/Kassy  Kassy called in regarding the attached patient (). Kassy called in and rescheduled patients post op for 6/22/18 at 10;15am and wanted to let Geovanny know to make sure that was OK.    Kassy's call back number is [phone]

## 2018-06-22 ENCOUNTER — TELEPHONE (OUTPATIENT)
Dept: SURGERY | Facility: CLINIC | Age: 83
End: 2018-06-22

## 2018-06-22 ENCOUNTER — OFFICE VISIT (OUTPATIENT)
Dept: SURGERY | Facility: CLINIC | Age: 83
End: 2018-06-22
Payer: MEDICARE

## 2018-06-22 VITALS
HEART RATE: 101 BPM | HEIGHT: 72 IN | DIASTOLIC BLOOD PRESSURE: 56 MMHG | SYSTOLIC BLOOD PRESSURE: 112 MMHG | WEIGHT: 160 LBS | BODY MASS INDEX: 21.67 KG/M2 | TEMPERATURE: 97 F

## 2018-06-22 DIAGNOSIS — Z09 POSTOP CHECK: Primary | ICD-10-CM

## 2018-06-22 PROCEDURE — 99214 OFFICE O/P EST MOD 30 MIN: CPT | Mod: PBBFAC,PO | Performed by: SURGERY

## 2018-06-22 PROCEDURE — 99999 PR PBB SHADOW E&M-EST. PATIENT-LVL IV: CPT | Mod: PBBFAC,,, | Performed by: SURGERY

## 2018-06-22 PROCEDURE — 99024 POSTOP FOLLOW-UP VISIT: CPT | Mod: POP,,, | Performed by: SURGERY

## 2018-06-22 RX ORDER — SIMVASTATIN 10 MG/1
10 TABLET, FILM COATED ORAL NIGHTLY
COMMUNITY
End: 2018-08-30

## 2018-06-22 NOTE — TELEPHONE ENCOUNTER
----- Message from Zully Zendejas sent at 6/22/2018  1:21 PM CDT -----  Contact: Kassy Shine wife  Asking for a few questions for Geovanny regarding antibiotics and packing strips. please call her on [phone] thanks !

## 2018-06-22 NOTE — TELEPHONE ENCOUNTER
----- Message from Nimo Nicole sent at 6/22/2018 12:42 PM CDT -----  Contact: Juana from [facility]  Name of Who is Calling: Juana      What is the request in detail: Juana is calling states pt's wife states doctor told pt to stop the antibiotics and Juana just wants to know if that's safe.      Can the clinic reply by [facility]: no      What Number to [facility] Back [facility]: Juana [phone]

## 2018-06-22 NOTE — PATIENT INSTRUCTIONS
Change dressing daily  Okay to shower before dressing change, be sure to remove dressing and packing and then shower.  Replace packing after shower.  Do not soak in a tub or pool until wound is healed or Physician allows.  Call Geovanny Mahmood LPN if you have any questions

## 2018-06-22 NOTE — PROGRESS NOTES
Cc: post op    HPI: 86 y.o.  male  4 weeks s/p partial proctectomy with colostomy.   Pt notes that he is feeling well.  No pain.  Energy slowly improving.      PE: AFVSS    AAOx3  CTA  Soft/NT/nd  Inc: c/d/i        Path:   FINAL PATHOLOGIC DIAGNOSIS  Procedure:  - Partial colectomy  Tumor Site:  - Rectum  Tumor Size:  - 3.3 cm in greatest dimension  Macroscopic Tumor Perforation:  - Not identified  Histologic Type:  - Adenocarcinoma  Histologic Grade:  - G2: Moderately differentiated  Tumor Extension:  - Tumor invades muscularis propria  Margins:  - All margins are uninvolved by invasive carcinoma  - Margins examined: proximal, distal and radial margins  - Invasive adenocarcinoma is 1 cm from the closest radial margin  Treatment Effect:  - No known presurgical therapy  Lymph-Vascular Invasion:  - Not identified  Perineural Invasion:  - Not identified  Tumor Deposits (discontinuous extramural extension):    Regional Lymph Nodes:  - Number of Lymph Nodes Involved: 0  - Number of Lymph Nodes Examined: 20  Pathologic Stage Classification (pTNM, AJCC 8th Edition)  - Primary Tumor: pT2  - Regional Lymph Nodes: pN0  - Distant metastasis: N/A      A/P:  ST I colon cancer   Pt doing well post surgery.   F/U with me in 4 weeks.    Refer to oncology

## 2018-06-25 ENCOUNTER — TELEPHONE (OUTPATIENT)
Dept: SURGERY | Facility: CLINIC | Age: 83
End: 2018-06-25

## 2018-06-25 NOTE — TELEPHONE ENCOUNTER
----- Message from Shanice Frances sent at 6/25/2018  9:36 AM CDT -----  Contact: Alfredo Huddleston RN w/ [facility]  Alfredo Huddleston RN w/ [facility] calling to speak to the Nurse. Please advise. Call to pod. No answer. She needs an order from the office to order colostomy supplies.    Call back    Thanks!

## 2018-06-25 NOTE — TELEPHONE ENCOUNTER
----- Message from Shanice Frances sent at 6/25/2018  3:56 PM CDT -----  Contact: Sonia iqbal/ [facility]   Sonia iqbal/ [facility] calling to speak to the Nurse. Please advise. Call to pod. Call connected. Warm transferred. Thanks!

## 2018-07-06 ENCOUNTER — TELEPHONE (OUTPATIENT)
Dept: UROLOGY | Facility: CLINIC | Age: 83
End: 2018-07-06

## 2018-07-06 ENCOUNTER — OFFICE VISIT (OUTPATIENT)
Dept: UROLOGY | Facility: CLINIC | Age: 83
End: 2018-07-06
Payer: MEDICARE

## 2018-07-06 VITALS
HEIGHT: 72 IN | WEIGHT: 160.06 LBS | SYSTOLIC BLOOD PRESSURE: 133 MMHG | DIASTOLIC BLOOD PRESSURE: 70 MMHG | BODY MASS INDEX: 21.68 KG/M2 | HEART RATE: 71 BPM

## 2018-07-06 DIAGNOSIS — N49.2 SCROTAL WALL ABSCESS: Primary | ICD-10-CM

## 2018-07-06 PROCEDURE — 99213 OFFICE O/P EST LOW 20 MIN: CPT | Mod: PBBFAC,PO | Performed by: UROLOGY

## 2018-07-06 PROCEDURE — 99214 OFFICE O/P EST MOD 30 MIN: CPT | Mod: S$PBB,,, | Performed by: UROLOGY

## 2018-07-06 PROCEDURE — 99999 PR PBB SHADOW E&M-EST. PATIENT-LVL III: CPT | Mod: PBBFAC,,, | Performed by: UROLOGY

## 2018-07-06 RX ORDER — CEPHALEXIN 500 MG/1
500 CAPSULE ORAL EVERY 8 HOURS
Qty: 21 CAPSULE | Refills: 0 | Status: SHIPPED | OUTPATIENT
Start: 2018-07-06 | End: 2018-07-16

## 2018-07-06 RX ORDER — TRAMADOL HYDROCHLORIDE 50 MG/1
TABLET ORAL
COMMUNITY
Start: 2018-06-23 | End: 2018-09-04

## 2018-07-06 NOTE — TELEPHONE ENCOUNTER
----- Message from Flaquito Chester sent at 7/6/2018  8:30 AM CDT -----  Contact: Wife, Kassy Elena want to speak with a nurse regarding patient having a knot on testicle please call back at [phone]

## 2018-07-06 NOTE — PROGRESS NOTES
Subjective:       Patient ID: Fabian Shine is a 86 y.o. male.    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 He completed a course of BCG.   He has been diagnosed with colon cancer since last seen and underwent colectomy with colostomy.  When he sat down today he had an intense pain the in the scrotum and there was bleeding.  He has no voiding symptoms.  He denies hematuria and dysuria.  He denies fever.      Review of Systems   Constitutional: Negative for fever.   Genitourinary: Negative for dysuria and hematuria.       Objective:      Physical Exam   Constitutional: He is oriented to person, place, and time. He appears well-developed and well-nourished.   HENT:   Head: Normocephalic and atraumatic.   Eyes: Conjunctivae are normal.   Cardiovascular: Normal rate.    Pulmonary/Chest: Effort normal.   Genitourinary: Testes normal and penis normal.         Musculoskeletal: Normal range of motion.   Neurological: He is alert and oriented to person, place, and time.   Skin: Skin is warm and dry. No rash noted.   Psychiatric: He has a normal mood and affect.   Vitals reviewed.      Assessment:       1. Scrotal wall abscess        Plan:       Scrotal wall abscess    Other orders  -     cephALEXin (KEFLEX) 500 MG capsule; Take 1 capsule (500 mg total) by mouth every 8 (eight) hours. for 10 days  Dispense: 21 capsule; Refill: 0      I was able to express a small amount of pus from the abscess and appears to be completely drained and superficial.    Rec Daily sitz baths.  RTC if it does no improve.

## 2018-07-06 NOTE — TELEPHONE ENCOUNTER
Spoke to pt's wife and booked pt an apt for today at 1pm. Pt has a knot on his testicle that is hard and was bleeding last night. The bleeding has stopped now, but they want to get it checked out.

## 2018-07-13 ENCOUNTER — OFFICE VISIT (OUTPATIENT)
Dept: SURGERY | Facility: CLINIC | Age: 83
End: 2018-07-13
Payer: MEDICARE

## 2018-07-13 VITALS — HEIGHT: 72 IN | TEMPERATURE: 97 F | BODY MASS INDEX: 21.67 KG/M2 | WEIGHT: 160 LBS

## 2018-07-13 DIAGNOSIS — C20 RECTAL CANCER: Primary | ICD-10-CM

## 2018-07-13 PROCEDURE — 99024 POSTOP FOLLOW-UP VISIT: CPT | Mod: POP,,, | Performed by: SURGERY

## 2018-07-13 PROCEDURE — 99999 PR PBB SHADOW E&M-EST. PATIENT-LVL III: CPT | Mod: PBBFAC,,, | Performed by: SURGERY

## 2018-07-13 PROCEDURE — 99213 OFFICE O/P EST LOW 20 MIN: CPT | Mod: PBBFAC,PO | Performed by: SURGERY

## 2018-07-13 NOTE — PROGRESS NOTES
Cc: post op    HPI: 86 y.o.  male  6 weeks s/p proctectomy.   Pt notes that he is feeling well. Ostomy working well.      PE: AFVSS    AAOx3  CTA  Soft/NT/nd  Inc: c/d/i        Path: T2NO rectal cancer    A/P:   Pt doing well post surgery.   F/U with me in 3 months  Refer to oncology

## 2018-07-20 ENCOUNTER — INITIAL CONSULT (OUTPATIENT)
Dept: HEMATOLOGY/ONCOLOGY | Facility: CLINIC | Age: 83
End: 2018-07-20
Payer: MEDICARE

## 2018-07-20 VITALS
DIASTOLIC BLOOD PRESSURE: 73 MMHG | WEIGHT: 160 LBS | RESPIRATION RATE: 20 BRPM | BODY MASS INDEX: 21.67 KG/M2 | HEIGHT: 72 IN | SYSTOLIC BLOOD PRESSURE: 122 MMHG | HEART RATE: 99 BPM | TEMPERATURE: 98 F

## 2018-07-20 DIAGNOSIS — I73.9 PAD (PERIPHERAL ARTERY DISEASE): ICD-10-CM

## 2018-07-20 DIAGNOSIS — I48.20 CHRONIC ATRIAL FIBRILLATION: ICD-10-CM

## 2018-07-20 DIAGNOSIS — C20 RECTAL CANCER: Primary | ICD-10-CM

## 2018-07-20 PROCEDURE — 99214 OFFICE O/P EST MOD 30 MIN: CPT | Mod: S$PBB,,, | Performed by: INTERNAL MEDICINE

## 2018-07-20 NOTE — LETTER
July 20, 2018      Willis Huang MD  [address] [facility] [address]  [facility] [address]           [facility] [address]  [facility] [address]  Phone: [phone]  Fax: [phone]          Patient: Fabian Shine   MR Number: [id number]   YOB: 1932   Date of Visit: 7/20/2018       Dear Dr. Willis Huang:    Thank you for referring Fabian Shine to me for evaluation. Attached you will find relevant portions of my assessment and plan of care.    If you have questions, please do not hesitate to call me. I look forward to following Fabian Shine along with you.    Sincerely,    Oumou Klein MD    Enclosure  CC:  No Recipients    If you would like to receive this communication electronically, please contact [email or web address] or [phone] to request more information on [facility] access.    For providers and/or their staff who would like to refer a patient to [facility], please contact us through our one-stop-shop provider referral line, [facility], at [phone].    If you feel you have received this communication in error or would no longer like to receive these types of communications, please e-mail [email or web address]

## 2018-07-20 NOTE — PROGRESS NOTES
Subjective:       Patient ID: Fabian Shine is a 86 y.o. male.    Chief Complaint: Consult (Dr. Huang Rectal [place])  had rectal bleeding , went to DR Redmond then to , had colon resection here to discuss further rx  HPI:     Social History     Social History    Marital status:      Spouse name: N/A    Number of children: N/A    Years of education: N/A     Social History Main Topics    Smoking status: Never Smoker    Smokeless tobacco: Never Used    Alcohol use 0.0 oz/week      Comment: RARELY    Drug use: No    Sexual activity: Not Asked     Other Topics Concern    None     Social History Narrative    None     Family History   Problem Relation Age of Onset    Heart disease Father      Past Surgical History:   Procedure Laterality Date    ANGIOPLASTY Left     LEG    APPENDECTOMY      BLADDER TUMOR EXCISION      x 2    COLONOSCOPY N/A 4/17/2018    Procedure: COLONOSCOPY;  Surgeon: Carlos Redmond MD;  Location: [facility];  Service: Endoscopy;  Laterality: N/A;    COLOSTOMY N/A 5/28/2018    Procedure: COLOSTOMY;  Surgeon: Willis Huang MD;  Location: [facility] OR;  Service: Colon and Rectal;  Laterality: N/A;    HERNIA REPAIR Left     inginual hernia    tibial stent Right     2 stents to right leg    TONSILLECTOMY      TUMOR REMOVAL      BLADDER     Past Medical History:   Diagnosis Date    A-fib     Anticoagulant long-term use     Arthritis     Bladder tumor     Cancer     bladder    Cataracts, bilateral     General anesthetics causing adverse effect in therapeutic use     hallucinations for several days    Hypertension     PAD (peripheral artery disease)     RLS (restless legs syndrome)     Supraventricular arrhythmia     Wears glasses        Current Outpatient Prescriptions:     bisoprolol (ZEBETA) 5 MG tablet, Take 1 tablet (5 mg total) by mouth once daily., Disp: 90 tablet, Rfl: 1    digoxin (DIGOX) 250 mcg tablet, Take 1 tablet (0.25 mg total) by mouth once daily.,  Disp: 90 tablet, Rfl: 1    FERREX 150 150 mg iron Cap, Cap 1 po daily except Sunday. (Patient taking differently: 150 mg once daily. Cap 1 po daily except Sunday.), Disp: 73 capsule, Rfl: 1    HYDROcodone-acetaminophen (NORCO) 5-325 mg per tablet, Take 1 tablet by mouth every 6 (six) hours as needed for Pain., Disp: 12 tablet, Rfl: 0    [id number]-algal oil (METANX, ALGAL OIL,) 3 mg-35 mg-2 mg -90.314 mg Cap, Take 1 capsule by mouth once daily., Disp: 90 capsule, Rfl: 3    menthol/zinc oxide (REMEDY CALAZIME SKIN PASTE TOP), Apply 1 application topically once daily. and prn barrier paste to buttocks, Disp: , Rfl:     pentoxifylline (TRENTAL) 400 mg TbSR, Take 1 tablet (400 mg total) by mouth 3 (three) times daily with meals., Disp: 270 tablet, Rfl: 1    pravastatin (PRAVACHOL) 10 MG tablet, Take 1 tablet (10 mg total) by mouth once daily. (Patient taking differently: Take 10 mg by mouth every evening. ), Disp: 90 tablet, Rfl: 1    psyllium 0.52 gram capsule, Take 0.52 g by mouth once daily., Disp: , Rfl:     rOPINIRole (REQUIP) 1 MG tablet, Tab 1 po at HS. (Patient taking differently: Take 0.5 mg by mouth every evening. Tab 1 po at HS.), Disp: 90 tablet, Rfl: 1    simvastatin (ZOCOR) 10 MG tablet, Take 10 mg by mouth every evening., Disp: , Rfl:     tamsulosin (FLOMAX) 0.4 mg Cp24, Take 1 capsule (0.4 mg total) by mouth once daily., Disp: 90 capsule, Rfl: 1    traMADol (ULTRAM) 50 mg tablet, , Disp: , Rfl:     triamcinolone acetonide 0.1% (KENALOG) 0.1 % cream, Apply topically 2 (two) times daily. Avoid face, axilla, groin, Disp: , Rfl:     XARELTO 15 mg Tab, TAKE 1 TABLET BY MOUTH ONCE A DAY, Disp: 90 tablet, Rfl: 4    diphenhydramine-acetaminophen (TYLENOL PM)  mg Tab, Take 2 tablets by mouth nightly., Disp: , Rfl:   Review of patient's allergies indicates:   Allergen Reactions    Bactrim [sulfamethoxazole-trimethoprim] Rash     Rash on chest and back         REVIEW OF SYSTEMS:      CONSTITUTIONAL: The patient denies any weight change. There is no apparent    change in appetite,coul;d be better since sx fever, night sweats, headaches, fatigue, dizziness, or    weakness.      SKIN: Denies rash, issues with nails, non-healing sores, bleeding, blotching    skin or abnormal bruising. Denies new moles or changes to existing moles.      BREASTS: There is no swelling around breasts or nipple discharge.    EYES: Denies eye pain, blurred vision, swelling, redness or discharge.      ENT AND MOUTH: Denies runny nose, stuffiness, sinus trouble or sores. Denies    nosebleeds. Denies, hoarseness, change in voice or swelling in front of the    neck.      CARDIOVASCULAR: Denies chest pain, discomfort or palpitations. Denies neck    swelling or episodes of passing out.      RESPIRATORY: Denies cough, sputum production, blood in sputum, and denies    shortness of breath.      GI: has colostomy+ does not like it , they have discussed reversal but blayne and DR espinosa both believe no more sx for him at this age  GENITOURINARY: No discharge. No pelvic pain or lumps. No rash around groin or  lesions. No urinary frequency, hesitation, painful urination or blood in    urine. Denies incontinence. No problems with intercourse.      MUSCULOSKELETAL: Denies neck or back pain. Denies weakness in arms or legs,    joint problems or distended inflamed veins in legs. Denies swelling or abnormal  glands.      NEUROLOGICAL: Denies tingling, numbness, altered mentation changes to nerve    function in the face, weakness to one or both of the body. Denies changes to    gait and denies multiple falls or accidents.      PSYCHIATRIC: Denies nervousness, anxiety, hallucinations, depression, suicidal    ideation, trouble sleeping or changes in behavior noticed by family.      The patient denies recent foreign travel or recent exposure to chemicals or    products of concern or infectious diseases.     PHYSICAL EXAM:     Vitals:     07/20/18 0857   BP: 122/73   Pulse: 99   Resp: 20   Temp: 97.5 °F (36.4 °C)       GENERAL: Comfortable looking patient. Patient is in no distress.  Awake, alert and oriented to time, person and place.  No anxiety, or agitation.      HEENT: Normal conjunctivae and eyelids. WNL.  PERRLA 3 to 4 mm. No icterus, no pallor, no congestion, and no discharge noted.     NECK:  Supple. Trachea is central.  No crepitus.  No JVD or masses.    RESPIRATORY:  No intercostal retractions.  No dullness to percussion.  Chest is clear to auscultation.  No rales, rhonchi or wheezes.  No crepitus.  Good air entry bilaterally.    CARDIOVASCULAR:  S1 and S2 are normally heard without murmurs or gallops.  All peripheral pulses are present.    ABDOMEN:  Colostomy+    LYMPHATICS:  No axillary, cervical, supraclavicular, submental, or inguinal lymphadenopathy.    SKIN/MUSCULOSKELETAL:  There is no evidence of excoriation marks or ecchmosis.  No rashes.  No cyanosis.  No clubbing.  No joint or skeletal deformities noted.  Normal range of motion.    NEUROLOGIC:  Higher functions are appropriate.  No cranial nerve deficits.  Normal chung.  Normal strength.  Motor and sensory functions are normal.  Deep tendon reflexes are normal.    GENITAL/RECTAL:  Exams are deferred.      Laboratory:     CBC:  Lab Results   Component Value Date    WBC 11.69 06/08/2018    RBC 4.13 (L) 06/08/2018    HGB 14.2 06/08/2018    HCT 39.0 (L) 06/08/2018    MCV 94 06/08/2018    MCH 32.7 (H) 06/08/2018    MCHC 34.6 06/08/2018    RDW 13.4 06/08/2018     06/08/2018    MPV 9.7 06/08/2018    GRAN 9.0 (H) 06/08/2018    GRAN 76.7 (H) 06/08/2018    LYMPH 1.4 06/08/2018    LYMPH 12.0 (L) 06/08/2018    MONO 0.9 06/08/2018    MONO 7.9 06/08/2018    EOS 0.4 06/08/2018    BASO 0.03 06/08/2018    EOSINOPHIL 3.1 06/08/2018    BASOPHIL 0.3 06/08/2018       BMP: BMP  Lab Results   Component Value Date     (L) 06/08/2018    K 3.5 06/08/2018    CL 98 06/08/2018    CO2 30  06/08/2018    BUN 12 06/08/2018    CREATININE 0.71 06/08/2018    CALCIUM 8.3 (L) 06/08/2018    ANIONGAP 4 (L) 06/08/2018    ESTGFRAFRICA >60 06/08/2018    EGFRNONAA >60 06/08/2018       LFT:   Lab Results   Component Value Date    ALT 40 06/07/2018    AST 22 06/07/2018    ALKPHOS 55 06/07/2018    BILITOT 0.7 06/07/2018     FINAL PATHOLOGIC DIAGNOSIS  Procedure:  - Partial colectomy  Tumor Site:  - Rectum  Tumor Size:  - 3.3 cm in greatest dimension  Macroscopic Tumor Perforation:  - Not identified  Histologic Type:  - Adenocarcinoma  Histologic Grade:  - G2: Moderately differentiated  Tumor Extension:  - Tumor invades muscularis propria  Margins:  - All margins are uninvolved by invasive carcinoma  - Margins examined: proximal, distal and radial margins  - Invasive adenocarcinoma is 1 cm from the closest radial margin  Treatment Effect:  - No known presurgical therapy  Lymph-Vascular Invasion:  - Not identified  Perineural Invasion:  - Not identified  Tumor Deposits (discontinuous extramural extension):  NMCH.  - Not identified  Regional Lymph Nodes:  - Number of Lymph Nodes Involved: 0  - Number of Lymph Nodes Examined: 20  Pathologic Stage Classification (pTNM, AJCC 8th Edition)  - Primary Tumor: pT2  - Regional Lymph Nodes: pN0  /A      Assessment/Plan:       1. Rectal cancer s/p Partial proctectomy with end colostomy.        Pt wishes to be left alone , there is no indication for adjuvant chemotherapy. ( some invasion of muscularis propria)   he wishes to follow with DR Huang , does not wish for scans   At his age I think this is an appropriate approach  rtc prn

## 2018-07-24 ENCOUNTER — TELEPHONE (OUTPATIENT)
Dept: OPHTHALMOLOGY | Facility: CLINIC | Age: 83
End: 2018-07-24

## 2018-07-24 NOTE — TELEPHONE ENCOUNTER
----- Message from Nayana Thomson sent at 7/24/2018  8:54 AM CDT -----  Contact: Pt's Wife Kassy Elena need to speak with nurse to get an appt scheduled for her  to be seen for Cataracts.Tried scheduling an appt but had no availability, pt would like to only be seen by Dr Post. , please call pt to advise  Call back   Thanks

## 2018-08-10 ENCOUNTER — INITIAL CONSULT (OUTPATIENT)
Dept: OPHTHALMOLOGY | Facility: CLINIC | Age: 83
End: 2018-08-10
Payer: MEDICARE

## 2018-08-10 DIAGNOSIS — H52.4 MYOPIA OF BOTH EYES WITH ASTIGMATISM AND PRESBYOPIA: ICD-10-CM

## 2018-08-10 DIAGNOSIS — H52.203 MYOPIA OF BOTH EYES WITH ASTIGMATISM AND PRESBYOPIA: ICD-10-CM

## 2018-08-10 DIAGNOSIS — H25.13 NUCLEAR SCLEROSIS, BILATERAL: Primary | ICD-10-CM

## 2018-08-10 DIAGNOSIS — H52.13 MYOPIA OF BOTH EYES WITH ASTIGMATISM AND PRESBYOPIA: ICD-10-CM

## 2018-08-10 PROCEDURE — 99213 OFFICE O/P EST LOW 20 MIN: CPT | Mod: PBBFAC,PO | Performed by: OPHTHALMOLOGY

## 2018-08-10 PROCEDURE — 99999 PR PBB SHADOW E&M-EST. PATIENT-LVL III: CPT | Mod: PBBFAC,,, | Performed by: OPHTHALMOLOGY

## 2018-08-10 PROCEDURE — 92004 COMPRE OPH EXAM NEW PT 1/>: CPT | Mod: S$PBB,,, | Performed by: OPHTHALMOLOGY

## 2018-08-10 PROCEDURE — 92136 OPHTHALMIC BIOMETRY: CPT | Mod: PBBFAC,PO,RT | Performed by: OPHTHALMOLOGY

## 2018-08-10 RX ORDER — PHENYLEPHRINE HYDROCHLORIDE 100 MG/ML
1 SOLUTION/ DROPS OPHTHALMIC ONCE
Status: CANCELLED | OUTPATIENT
Start: 2018-08-10 | End: 2018-08-10

## 2018-08-10 RX ORDER — LIDOCAINE HYDROCHLORIDE 40 MG/ML
1 INJECTION, SOLUTION RETROBULBAR
Status: CANCELLED | OUTPATIENT
Start: 2018-08-10

## 2018-08-10 RX ORDER — LEVOFLOXACIN 5 MG/ML
1 SOLUTION OPHTHALMIC 4 TIMES DAILY
Qty: 5 ML | Refills: 0 | Status: SHIPPED | OUTPATIENT
Start: 2018-09-04 | End: 2018-09-16

## 2018-08-10 RX ORDER — CYCLOPENTOLATE HYDROCHLORIDE 10 MG/ML
1 SOLUTION/ DROPS OPHTHALMIC
Status: CANCELLED | OUTPATIENT
Start: 2018-08-10

## 2018-08-10 RX ORDER — PROPARACAINE HYDROCHLORIDE 5 MG/ML
1 SOLUTION/ DROPS OPHTHALMIC
Status: CANCELLED | OUTPATIENT
Start: 2018-08-10 | End: 2018-08-10

## 2018-08-10 RX ORDER — LIDOCAINE HYDROCHLORIDE 40 MG/ML
1 INJECTION, SOLUTION RETROBULBAR
Status: CANCELLED | OUTPATIENT
Start: 2018-08-10 | End: 2018-08-10

## 2018-08-10 RX ORDER — ATROPINE SULFATE 10 MG/ML
1 SOLUTION/ DROPS OPHTHALMIC 4 TIMES DAILY
Qty: 2 ML | Refills: 0 | Status: ON HOLD | OUTPATIENT
Start: 2018-09-02 | End: 2018-09-05 | Stop reason: HOSPADM

## 2018-08-10 RX ORDER — MOXIFLOXACIN 5 MG/ML
1 SOLUTION/ DROPS OPHTHALMIC
Status: CANCELLED | OUTPATIENT
Start: 2018-08-10 | End: 2018-08-10

## 2018-08-10 RX ORDER — PROPARACAINE HYDROCHLORIDE 5 MG/ML
1 SOLUTION/ DROPS OPHTHALMIC
Status: CANCELLED | OUTPATIENT
Start: 2018-08-10

## 2018-08-10 RX ORDER — PREDNISOLONE ACETATE 10 MG/ML
1 SUSPENSION/ DROPS OPHTHALMIC 4 TIMES DAILY
Qty: 5 ML | Refills: 2 | Status: ON HOLD | OUTPATIENT
Start: 2018-09-05 | End: 2018-09-05 | Stop reason: SDUPTHER

## 2018-08-10 RX ORDER — TROPICAMIDE 10 MG/ML
1 SOLUTION/ DROPS OPHTHALMIC
Status: CANCELLED | OUTPATIENT
Start: 2018-08-10

## 2018-08-10 RX ORDER — KETOROLAC TROMETHAMINE 5 MG/ML
1 SOLUTION OPHTHALMIC 4 TIMES DAILY
Qty: 5 ML | Refills: 2 | Status: SHIPPED | OUTPATIENT
Start: 2018-09-02 | End: 2018-10-11 | Stop reason: ALTCHOICE

## 2018-08-10 RX ORDER — PHENYLEPHRINE HYDROCHLORIDE 25 MG/ML
1 SOLUTION/ DROPS OPHTHALMIC
Status: CANCELLED | OUTPATIENT
Start: 2018-08-10

## 2018-08-10 NOTE — PATIENT INSTRUCTIONS
What Are Cataracts?  A clear lens in the eye focuses light. This lets the eye see images sharply. With age, the lens slowly becomes cloudy. The cloudy lens is a cataract. A cataract scatters light and makes it hard for the eye to focus. Cataracts often form in both eyes. But one lens may cloud faster than the other.      The aging of your lens  Your lens may cloud so slowly that you don`t notice any vision changes at first. But as the cataract gets worse, the eye has a harder time focusing. In early stages, glasses may help you see better. As the lens gets more cloudy, your doctor may recommend surgery to restore your vision.  Date Last Reviewed: 6/14/2015  © 8330-1965 [employer]. [address] [place] [address]. All rights reserved. This information is not intended as a substitute for professional medical care. Always follow your healthcare professional's instructions.        Small-Incision Cataract Surgery: Removing the Old Lens  You may be surprised by how little time small-incision cataract surgery takes.  The procedure  Your doctor uses a microscope and tiny tools to make the incision and remove the old lens. A special tool breaks apart the old lens with sound waves (ultrasound) and then takes out the pieces. This process is called phacoemulsification. The natural membrane (capsule) that held your lens is left in place.  Incision size  A smaller incision (top) means a shorter recovery time for you. The location of the incision will vary.         Date Last Reviewed: 6/14/2015  © 3989-6713 [employer]. [address] [place] [address]. All rights reserved. This information is not intended as a substitute for professional medical care. Always follow your healthcare professional's instructions.        Small-Incision Cataract Surgery: Implanting the New Lens  Once your old lens has been removed, your doctor slips the new lens (IOL or intraocular lens) in through the  incision. The IOL is then placed in the capsule that held your old lens. With the new lens in place, your doctor is ready to close the incision. Some incisions may be closed with stitches. Others are self-sealing. That means they will stay closed on their own without stitches. The IOL does much the same thing as your old lens did before it became cloudy. It focuses light, letting you see sharp images and vivid colors. The IOL normally lasts a lifetime.  How small is an IOL?        Flexible tabs hold the IOL in place in the eye's natural capsule.     Date Last Reviewed: 6/14/2015  © 5756-8035 [employer]. [address] [place] [address], [place] [address]. All rights reserved. This information is not intended as a substitute for professional medical care. Always follow your healthcare professional's instructions.        Before Small-Incision Cataract Surgery    Like any operation, small-incision cataract surgery requires preparation.  Your health history  Your eye doctor will review your health history. Based on that, he or she may order tests or talk to your other health care providers. Tell your eye doctor which medicines you take. That includes over-the-counter medicine such as aspirin.  Your eye exam  You will have a complete eye exam. Your eye doctor or a technician will use devices that measure the length and curve of your eye. These measurements let your doctor select the proper new lens (IOL or intraocular lens) for you.  The night before surgery  Dont eat or drink anything after midnight the night before your surgery. This includes water, coffee, chewing gum, and mints. If you have been told to continue your daily medicine, take it only with small sips of water. Make sure you follow any other instructions your doctor gives you.  The day of surgery  Have someone you know drive you to and from the outpatient surgery clinic or hospital. Plan to be there for about 2 to 3 hours. When you arrive, youll sign  a consent form if you havent done so already. This form explains the risks of surgery. Just before surgery, your doctor will give you medicine that will relax you and keep you from feeling pain. You may sleep lightly.  Risks and complications  · Your doctor may have to shift from a small incision to a larger incision.  · There is a small chance of bleeding, infection, or swelling.   Date Last Reviewed: 6/14/2015 © 2000-2016 [employer]. [address] [place] [address], [place] [address]. All rights reserved. This information is not intended as a substitute for professional medical care. Always follow your healthcare professional's instructions.        After Small-Incision Cataract Surgery: The First 24 Hours    After surgery, youll rest in a recovery area for about an hour. Even though you may feel fine, you should take it easy. Your doctor will let you know what you should and shouldnt do once you get home. You may need to wear eye protection the first day. Also remember to take any eye drops or other medicine your doctor prescribes.  Back at home  Spend your first day relaxing at home. Watch TV, read, or talk to a friend. Be careful to:  · Not rub your eye  · Not lift anything that makes you strain  · Not drink alcohol within the first 24 hours  What to expect  Its normal for your eye to be bruised or bloodshot at first. You may also feel itching or mild discomfort. You may have some short-term (temporary) fluid discharge. These wont last long.   Getting back in action  You may be able to get back to much of your routine on the first day. But with some tasks, your doctor may ask you to wait. Always follow your doctor's recommendations.  Here are some general guidelines:  · You can shower again in 2 to 3 days.  · You can cook again in 2 to 3 days.  · You can drive again in 5 to 7 days.  · You can exercise again in 14 days.  When to call your doctor  Call your doctor if:  · Your pain is not relieved by  over-the-counter medicine.  · You have nausea or vomiting.  · Your vision suddenly becomes worse.   Date Last Reviewed: 6/14/2015  © 8457-8014 [employer]. [address] [place] [address], [place] [address]. All rights reserved. This information is not intended as a substitute for professional medical care. Always follow your healthcare professional's instructions.

## 2018-08-10 NOTE — PROGRESS NOTES
HPI     85 YO male presents today for an annual eye exam. He states that he was   seen by an outside optometrist (unsure of name) and she told him he has   cataracts and should see a specialist about getting them removed.     OTC AT [facility].     Presents today with his wife. Stopped driving about 5 years because he   couldn't see well enough. Per wife, also has vascular issues with his   legs. Does not take glasses off to read - uses progressives. Remote   history of rigid contact lens wear for 10 years.     Last edited by Paola Post MD on 8/10/2018  2:12 PM.   (History)        ROS     Positive for: Neurological (restless legs; denies hx seizure),   Genitourinary (flomax), Cardiovascular (PVD, CAD, HTN - controlled with   meds per wife.), Heme/Lymph (xarelto)    Negative for: Endocrine (denies DM), Eyes (denies surgery/trauma),   Respiratory (denies astma/orthopnea)    Last edited by Paola Post MD on 8/10/2018  2:17 PM.   (History)        Assessment /Plan     For exam results, see Encounter Report.    Nuclear sclerosis, bilateral  -     Case Request Operating Room: PHACOEMULSIFICATION, CATARACT    Myopia of both eyes with astigmatism and presbyopia          Visually significant OD>OS. Will need both, otherwise will have anisometropia. Dilates poorly due to flomax - will need Atropine 3 days prior, also 10% phenylephrine am of surgery if vital signs ok.     Target emmetropia. K's not yet documented.

## 2018-08-30 NOTE — H&P (VIEW-ONLY)
Subjective:       Patient ID: Fabian Shine is a 86 y.o. male.    Chief Complaint: Annual Exam (PRINT IRON RX. ALL OTHER TO MAIL ORDER. CATARACT SURGERY ON 9/5 RIGHT EYE AND 1 MONTH LATER THE LEFT)    Patient here for preoperative clearance for cataract surgery, and for reassessment of lipids, blood pressure and heart rhythm.  Recent proctectomy to remove mass and now with ostomy bag.  He is here with his wife, Kassy.      Review of Systems   Constitutional: Negative.    HENT: Negative.    Eyes: Negative.    Respiratory: Negative.    Cardiovascular: Negative.    Gastrointestinal: Negative.         Ostomy bag.   Endocrine: Negative.    Genitourinary: Negative.    Musculoskeletal: Negative.    Skin: Negative.    Allergic/Immunologic: Negative.    Neurological: Negative.         Patient is in a wheelchair.   Hematological: Negative.    Psychiatric/Behavioral: Negative.        Objective:      Physical Exam   Constitutional: He is oriented to person, place, and time. He appears well-developed and well-nourished.   Without lower extremity edema.   HENT:   Head: Normocephalic and atraumatic.   Eyes:   Sclerae clear   Cardiovascular: Normal rate, regular rhythm and normal heart sounds.   Pulmonary/Chest: Effort normal and breath sounds normal. No stridor. No respiratory distress. He has no wheezes. He has no rales.   Abdominal: Soft. There is no tenderness.   Neurological: He is alert and oriented to person, place, and time.   Grossly intact.   Skin: Skin is warm and dry.   Without lesions or rashes.   Psychiatric: He has a normal mood and affect. His behavior is normal.   Nursing note and vitals reviewed.      Assessment:       1. Benign non-nodular prostatic hyperplasia without lower urinary tract symptoms    2. Decreased circulation    3. Dyslipidemia    4. Essential hypertension    5. Encounter for long-term (current) use of medications    6. RLS (restless legs syndrome)    7. Preoperative exam for gynecologic surgery     8. Paroxysmal atrial fibrillation    9. Other iron deficiency anemia        Plan:       Benign non-nodular prostatic hyperplasia without lower urinary tract symptoms  -     tamsulosin (FLOMAX) 0.4 mg Cap; Take 1 capsule (0.4 mg total) by mouth once daily.  Dispense: 90 capsule; Refill: 1    Decreased circulation  -     rivaroxaban (XARELTO) 15 mg Tab; Take 1 tablet (15 mg total) by mouth once daily.  Dispense: 90 tablet; Refill: 1  -     pentoxifylline (TRENTAL) 400 mg TbSR; Take 1 tablet (400 mg total) by mouth 3 (three) times daily with meals.  Dispense: 270 tablet; Refill: 1    Dyslipidemia  -     pravastatin (PRAVACHOL) 10 MG tablet; Take 1 tablet (10 mg total) by mouth once daily.  Dispense: 90 tablet; Refill: 1  -     Lipid panel; Future; Expected date: 01/30/2019    Essential hypertension  -     bisoprolol (ZEBETA) 5 MG tablet; Take 1 tablet (5 mg total) by mouth once daily.  Dispense: 90 tablet; Refill: 1    Encounter for long-term (current) use of medications  -     Comprehensive metabolic panel; Future; Expected date: 01/30/2019  -     Digoxin level; Future; Expected date: 01/30/2019    RLS (restless legs syndrome)  -     rOPINIRole (REQUIP) 0.5 MG tablet; Tab 1 po with evening meal, and tab 1 po at HS.  Dispense: 180 tablet; Refill: 1    Preoperative exam for gynecologic surgery    Paroxysmal atrial fibrillation    Other iron deficiency anemia  -     Iron and TIBC; Future; Expected date: 01/30/2019  -     CBC auto differential; Future; Expected date: 01/30/2019    Other orders  -     digoxin (DIGOX) 250 mcg tablet; Take 1 tablet (0.25 mg total) by mouth once daily.  Dispense: 90 tablet; Refill: 1  -     FERREX 150 150 mg iron Cap; Cap 1 po daily except Sunday.  Dispense: 73 capsule; Refill: 1  -     HYDROcodone-acetaminophen (NORCO) 5-325 mg per tablet; Take 1 tablet by mouth every 12 (twelve) hours as needed for Pain (Warning of drowsiness or confusion.).  Dispense: 50 tablet; Refill: 0

## 2018-09-04 ENCOUNTER — ANESTHESIA EVENT (OUTPATIENT)
Dept: SURGERY | Facility: AMBULARY SURGERY CENTER | Age: 83
End: 2018-09-04
Payer: MEDICARE

## 2018-09-05 ENCOUNTER — HOSPITAL ENCOUNTER (OUTPATIENT)
Facility: AMBULARY SURGERY CENTER | Age: 83
Discharge: HOME | End: 2018-09-05
Attending: OPHTHALMOLOGY | Admitting: OPHTHALMOLOGY
Payer: MEDICARE

## 2018-09-05 ENCOUNTER — ANESTHESIA (OUTPATIENT)
Dept: SURGERY | Facility: AMBULARY SURGERY CENTER | Age: 83
End: 2018-09-05
Payer: MEDICARE

## 2018-09-05 DIAGNOSIS — H21.81 TAMSULOSIN-ASSOCIATED INTRAOPERATIVE FLOPPY IRIS SYNDROME (IFIS): Primary | ICD-10-CM

## 2018-09-05 DIAGNOSIS — H25.13 NUCLEAR SCLEROSIS, BILATERAL: ICD-10-CM

## 2018-09-05 DIAGNOSIS — T44.6X5A TAMSULOSIN-ASSOCIATED INTRAOPERATIVE FLOPPY IRIS SYNDROME (IFIS): Primary | ICD-10-CM

## 2018-09-05 PROCEDURE — G8907 PT DOC NO EVENTS ON DISCHARG: HCPCS | Performed by: OPHTHALMOLOGY

## 2018-09-05 PROCEDURE — 66982 XCAPSL CTRC RMVL CPLX WO ECP: CPT | Performed by: OPHTHALMOLOGY

## 2018-09-05 PROCEDURE — D9220A PRA ANESTHESIA: Mod: ANES,,, | Performed by: ANESTHESIOLOGY

## 2018-09-05 PROCEDURE — 66982 XCAPSL CTRC RMVL CPLX WO ECP: CPT | Mod: RT,,, | Performed by: OPHTHALMOLOGY

## 2018-09-05 PROCEDURE — D9220A PRA ANESTHESIA: Mod: CRNA,,, | Performed by: NURSE ANESTHETIST, CERTIFIED REGISTERED

## 2018-09-05 DEVICE — IMPLANTABLE DEVICE: Type: IMPLANTABLE DEVICE | Site: EYE | Status: FUNCTIONAL

## 2018-09-05 RX ORDER — EPINEPHRINE 1 MG/ML
INJECTION, SOLUTION INTRACARDIAC; INTRAMUSCULAR; INTRAVENOUS; SUBCUTANEOUS
Status: DISCONTINUED | OUTPATIENT
Start: 2018-09-05 | End: 2018-09-05 | Stop reason: HOSPADM

## 2018-09-05 RX ORDER — MIDAZOLAM HYDROCHLORIDE 1 MG/ML
INJECTION INTRAMUSCULAR; INTRAVENOUS
Status: COMPLETED
Start: 2018-09-05 | End: 2018-09-05

## 2018-09-05 RX ORDER — ONDANSETRON HYDROCHLORIDE 2 MG/ML
INJECTION, SOLUTION INTRAMUSCULAR; INTRAVENOUS
Status: DISCONTINUED | OUTPATIENT
Start: 2018-09-05 | End: 2018-09-05

## 2018-09-05 RX ORDER — MOXIFLOXACIN 5 MG/ML
SOLUTION/ DROPS OPHTHALMIC
Status: DISCONTINUED | OUTPATIENT
Start: 2018-09-05 | End: 2018-09-05 | Stop reason: HOSPADM

## 2018-09-05 RX ORDER — PHENYLEPHRINE HYDROCHLORIDE 100 MG/ML
1 SOLUTION/ DROPS OPHTHALMIC ONCE
Status: COMPLETED | OUTPATIENT
Start: 2018-09-05 | End: 2018-09-05

## 2018-09-05 RX ORDER — LIDOCAINE HYDROCHLORIDE 40 MG/ML
1 INJECTION, SOLUTION RETROBULBAR
Status: DISCONTINUED | OUTPATIENT
Start: 2018-09-05 | End: 2018-09-05 | Stop reason: HOSPADM

## 2018-09-05 RX ORDER — LIDOCAINE HYDROCHLORIDE 40 MG/ML
1 INJECTION, SOLUTION RETROBULBAR
Status: COMPLETED | OUTPATIENT
Start: 2018-09-05 | End: 2018-09-05

## 2018-09-05 RX ORDER — TROPICAMIDE 10 MG/ML
1 SOLUTION/ DROPS OPHTHALMIC
Status: DISCONTINUED | OUTPATIENT
Start: 2018-09-05 | End: 2018-09-05 | Stop reason: HOSPADM

## 2018-09-05 RX ORDER — LIDOCAINE HYDROCHLORIDE 10 MG/ML
0.5 INJECTION, SOLUTION EPIDURAL; INFILTRATION; INTRACAUDAL; PERINEURAL ONCE AS NEEDED
Status: DISCONTINUED | OUTPATIENT
Start: 2018-09-05 | End: 2018-09-05 | Stop reason: HOSPADM

## 2018-09-05 RX ORDER — CYCLOPENTOLATE HYDROCHLORIDE 10 MG/ML
1 SOLUTION/ DROPS OPHTHALMIC
Status: DISCONTINUED | OUTPATIENT
Start: 2018-09-05 | End: 2018-09-05 | Stop reason: HOSPADM

## 2018-09-05 RX ORDER — MOXIFLOXACIN 5 MG/ML
1 SOLUTION/ DROPS OPHTHALMIC
Status: COMPLETED | OUTPATIENT
Start: 2018-09-05 | End: 2018-09-05

## 2018-09-05 RX ORDER — SODIUM CHLORIDE 0.9 % (FLUSH) 0.9 %
3 SYRINGE (ML) INJECTION
Status: DISCONTINUED | OUTPATIENT
Start: 2018-09-05 | End: 2018-09-05 | Stop reason: HOSPADM

## 2018-09-05 RX ORDER — MIDAZOLAM HYDROCHLORIDE 1 MG/ML
INJECTION INTRAMUSCULAR; INTRAVENOUS
Status: DISCONTINUED | OUTPATIENT
Start: 2018-09-05 | End: 2018-09-05

## 2018-09-05 RX ORDER — PROPARACAINE HYDROCHLORIDE 5 MG/ML
SOLUTION/ DROPS OPHTHALMIC
Status: DISCONTINUED | OUTPATIENT
Start: 2018-09-05 | End: 2018-09-05 | Stop reason: HOSPADM

## 2018-09-05 RX ORDER — PREDNISOLONE ACETATE 10 MG/ML
1 SUSPENSION/ DROPS OPHTHALMIC
Qty: 5 ML | Refills: 2 | Status: SHIPPED | OUTPATIENT
Start: 2018-09-05 | End: 2018-10-11 | Stop reason: ALTCHOICE

## 2018-09-05 RX ORDER — PHENYLEPHRINE HYDROCHLORIDE 25 MG/ML
1 SOLUTION/ DROPS OPHTHALMIC
Status: DISCONTINUED | OUTPATIENT
Start: 2018-09-05 | End: 2018-09-05 | Stop reason: HOSPADM

## 2018-09-05 RX ORDER — PROPARACAINE HYDROCHLORIDE 5 MG/ML
1 SOLUTION/ DROPS OPHTHALMIC
Status: DISCONTINUED | OUTPATIENT
Start: 2018-09-05 | End: 2018-09-05 | Stop reason: HOSPADM

## 2018-09-05 RX ORDER — SODIUM CHLORIDE, SODIUM LACTATE, POTASSIUM CHLORIDE, CALCIUM CHLORIDE 600; 310; 30; 20 MG/100ML; MG/100ML; MG/100ML; MG/100ML
INJECTION, SOLUTION INTRAVENOUS CONTINUOUS
Status: DISCONTINUED | OUTPATIENT
Start: 2018-09-05 | End: 2018-09-05 | Stop reason: HOSPADM

## 2018-09-05 RX ORDER — FENTANYL CITRATE 50 UG/ML
INJECTION, SOLUTION INTRAMUSCULAR; INTRAVENOUS
Status: DISCONTINUED | OUTPATIENT
Start: 2018-09-05 | End: 2018-09-05

## 2018-09-05 RX ORDER — ACETAMINOPHEN 325 MG/1
650 TABLET ORAL EVERY 6 HOURS PRN
Status: DISCONTINUED | OUTPATIENT
Start: 2018-09-05 | End: 2018-09-05 | Stop reason: HOSPADM

## 2018-09-05 RX ORDER — ONDANSETRON 2 MG/ML
INJECTION INTRAMUSCULAR; INTRAVENOUS
Status: COMPLETED
Start: 2018-09-05 | End: 2018-09-05

## 2018-09-05 RX ORDER — EPINEPHRINE 1 MG/ML
INJECTION, SOLUTION INTRACARDIAC; INTRAMUSCULAR; INTRAVENOUS; SUBCUTANEOUS
Status: DISCONTINUED
Start: 2018-09-05 | End: 2018-09-05 | Stop reason: HOSPADM

## 2018-09-05 RX ORDER — LIDOCAINE HYDROCHLORIDE 40 MG/ML
SOLUTION TOPICAL
Status: DISCONTINUED | OUTPATIENT
Start: 2018-09-05 | End: 2018-09-05 | Stop reason: HOSPADM

## 2018-09-05 RX ORDER — LIDOCAINE HYDROCHLORIDE 10 MG/ML
INJECTION, SOLUTION EPIDURAL; INFILTRATION; INTRACAUDAL; PERINEURAL
Status: DISCONTINUED | OUTPATIENT
Start: 2018-09-05 | End: 2018-09-05 | Stop reason: HOSPADM

## 2018-09-05 RX ORDER — FENTANYL CITRATE 50 UG/ML
INJECTION, SOLUTION INTRAMUSCULAR; INTRAVENOUS
Status: COMPLETED
Start: 2018-09-05 | End: 2018-09-05

## 2018-09-05 RX ORDER — PROPARACAINE HYDROCHLORIDE 5 MG/ML
1 SOLUTION/ DROPS OPHTHALMIC
Status: COMPLETED | OUTPATIENT
Start: 2018-09-05 | End: 2018-09-05

## 2018-09-05 RX ADMIN — CYCLOPENTOLATE HYDROCHLORIDE 1 DROP: 10 SOLUTION/ DROPS OPHTHALMIC at 09:09

## 2018-09-05 RX ADMIN — MIDAZOLAM HYDROCHLORIDE 1 MG: 1 INJECTION INTRAMUSCULAR; INTRAVENOUS at 11:09

## 2018-09-05 RX ADMIN — PROPARACAINE HYDROCHLORIDE 1 DROP: 5 SOLUTION/ DROPS OPHTHALMIC at 11:09

## 2018-09-05 RX ADMIN — SODIUM CHLORIDE, SODIUM LACTATE, POTASSIUM CHLORIDE, CALCIUM CHLORIDE: 600; 310; 30; 20 INJECTION, SOLUTION INTRAVENOUS at 10:09

## 2018-09-05 RX ADMIN — PROPARACAINE HYDROCHLORIDE 1 DROP: 5 SOLUTION/ DROPS OPHTHALMIC at 09:09

## 2018-09-05 RX ADMIN — MOXIFLOXACIN 1 DROP: 5 SOLUTION/ DROPS OPHTHALMIC at 09:09

## 2018-09-05 RX ADMIN — FENTANYL CITRATE 25 MCG: 50 INJECTION, SOLUTION INTRAMUSCULAR; INTRAVENOUS at 12:09

## 2018-09-05 RX ADMIN — TROPICAMIDE 1 DROP: 10 SOLUTION/ DROPS OPHTHALMIC at 09:09

## 2018-09-05 RX ADMIN — ONDANSETRON HYDROCHLORIDE 4 MG: 2 INJECTION, SOLUTION INTRAMUSCULAR; INTRAVENOUS at 11:09

## 2018-09-05 RX ADMIN — LIDOCAINE HYDROCHLORIDE 1 DROP: 40 INJECTION, SOLUTION RETROBULBAR at 10:09

## 2018-09-05 RX ADMIN — PHENYLEPHRINE HYDROCHLORIDE 1 DROP: 25 SOLUTION/ DROPS OPHTHALMIC at 09:09

## 2018-09-05 RX ADMIN — PHENYLEPHRINE HYDROCHLORIDE 1 DROP: 100 SOLUTION/ DROPS OPHTHALMIC at 10:09

## 2018-09-05 RX ADMIN — PROPARACAINE HYDROCHLORIDE 1 DROP: 5 SOLUTION/ DROPS OPHTHALMIC at 10:09

## 2018-09-05 RX ADMIN — LIDOCAINE HYDROCHLORIDE 1 DROP: 40 INJECTION, SOLUTION RETROBULBAR at 11:09

## 2018-09-05 RX ADMIN — FENTANYL CITRATE 25 MCG: 50 INJECTION, SOLUTION INTRAMUSCULAR; INTRAVENOUS at 11:09

## 2018-09-05 RX ADMIN — MIDAZOLAM HYDROCHLORIDE 0.5 MG: 1 INJECTION INTRAMUSCULAR; INTRAVENOUS at 11:09

## 2018-09-05 NOTE — ANESTHESIA PREPROCEDURE EVALUATION
09/05/2018  Fabian Shine is a 86 y.o., male.    Anesthesia Evaluation      I have reviewed the Medications.     Review of Systems  Anesthesia Hx:  No problems with previous Anesthesia   Social:  Non-Smoker, No Alcohol Use    Cardiovascular:   Hypertension Dysrhythmias atrial fibrillation CHF PVD (left LE angioplasty 8/17) hyperlipidemia    Pulmonary:  Pulmonary Normal    Renal/:  Renal/ Normal     Hepatic/GI:  Hepatic/GI Normal    Neurological:  Neurology Normal RLS   Endocrine:  Endocrine Normal        Physical Exam  General:  Well nourished    Airway/Jaw/Neck:  Airway Findings: Mouth Opening: Normal General Airway Assessment: Adult  Mallampati: II  Jaw/Neck Findings:  Neck ROM: Extension Decreased, Mild       Chest/Lungs:  Chest/Lungs Findings: Clear to auscultation, Normal Respiratory Rate     Heart/Vascular:  Heart Findings: Rate: Normal  Rhythm: Irregularly Irregular  Sounds: Normal  Heart murmur: negative Vascular Findings: Normal (No carotid bruits.)       Mental Status:  Mental Status Findings:  Cooperative, Alert and Oriented         Anesthesia Plan  Type of Anesthesia, risks & benefits discussed:  Anesthesia Type:  MAC  Patient's Preference:   Intra-op Monitoring Plan:   Intra-op Monitoring Plan Comments:   Post Op Pain Control Plan:   Post Op Pain Control Plan Comments:   Induction:    Beta Blocker:  Patient is on a Beta-Blocker and has received one dose within the past 24 hours (No further documentation required).       Informed Consent: Patient understands risks and agrees with Anesthesia plan.  Questions answered. Anesthesia consent signed with patient.  ASA Score: 3     Day of Surgery Review of History & Physical:            Ready For Surgery From Anesthesia Perspective.

## 2018-09-05 NOTE — TRANSFER OF CARE
Anesthesia Transfer of Care Note    Patient: Fabian Shine    Procedure(s) Performed: Procedure(s) (LRB):  PHACOEMULSIFICATION, CATARACT (Right)    Patient location: PACU    Anesthesia Type: MAC    Transport from OR: Transported from OR on room air with adequate spontaneous ventilation    Post pain: adequate analgesia    Post assessment: no apparent anesthetic complications and tolerated procedure well    Post vital signs: stable    Level of consciousness: awake    Nausea/Vomiting: no nausea/vomiting    Transfer of care protocol was followed      Last vitals:   Visit Vitals  /63 (BP Location: Right arm, Patient Position: Lying)   Pulse 84   Temp 36.5 °C (97.7 °F) (Skin)   Resp 18   Ht 6' (1.829 m)   Wt 74.8 kg (165 lb)   SpO2 100%   BMI 22.38 kg/m²

## 2018-09-05 NOTE — INTERVAL H&P NOTE
"See PCP H&P. No changes noted. Heart and lungs per anesthesia. "This patient has been cleared for surgery in an ambulatory surgery center/facility."    Vitals:    09/05/18 0936   BP: 112/63   Pulse: 84   Resp: 18   Temp: 97.7 °F (36.5 °C)   '  "

## 2018-09-05 NOTE — OP NOTE
Date of surgery: 9/5/2018    Operative Report    Preoperative Diagnosis:      Nuclear sclerosis, right eye with small pupil    Postoperative Diagnosis:      Nuclear sclerosis, right eye with small pupil, intraoperative floppy iris syndrome    Procedure: Phacoemulsification with posterior chamber intraocular lens, right eye, with Visitec I-ring    Surgeon: Paola Post M.D.    Anesthesia: MAC with topical    Procedure in detail:  Informed consent was obtained. Indications, risks and alternatives to the procedure were explained to the patient. The patient was given the opportunity to ask questions and consented to the procedure in writing. In the preoperative holding area, the patients identity and operative site were confirmed. Topical anesthetic was administered, consisting of alternating 0.5% Proparacaine and 4% preservative-free Lidocaine Q 5 minutes times 3. The patient was taken to the operative suite. A time-out was performed. The right eye was prepped with 5% Betadine and draped in sterile fashion. A lid speculum was placed. A paracentesis was made at the 10 oclock position. 1% preservative-free lidocaine was placed in the anterior chamber, followed by Viscoat. A bi-planar clear corneal incision was made at the 8 oclock position. A Visitec I-ring was placed to expand the pupil. The cystotome was then used to begin the continuous curvilinear capsulorrhexis, which was completed with the Utrata forceps.     BSS on a blunt-tipped cannula and a Moore hydrodissection cannula were used to attempt to hydrodissect the lens nucleus, however it would not rotate freely. Phacoemulsification was started in a V-pattern to attempt to crack the nucleus and remove a fragment for easier manipulation, however the lens would not crack at either point. Significant time was taking, alternating additional Viscoat, hydrodissection, removal of fragments. Finally, the nucleus was able to be rotated to complete removal with  stop-and-chop technique.    This was followed by irrigation and aspiration of the lens cortex. The capsular bag was inflated with Healon. The lens, which was an Salvatore Acrysoft IQ IOL, model SN60WF, power 11.5, was placed in the capsular bag and rotated into position. The Visitec I-ring was removed The remaining viscoelastic material was removed by I&A. The wound and paracentesis were hydrated. The lens was centered. The anterior chamber was deep. The eye pressure was good. A 10-0 nylon suture was placed at the incision site. The wounds were checked and watertight. The lid speculum and drape were removed. A drop of Vigamox was placed in the eye. A clear shield was taped in place over the eye.    TOTAL ULTRASOUND TIME: 2:47.6    PERCENTAGE OF TIME IN POSITION 3: 20.9 %    Estimated blood loss: none    Specimens: none    Complications: none    Disposition: stable to PACU

## 2018-09-05 NOTE — DISCHARGE INSTRUCTIONS
Anesthesia information    Anesthesia Safety      You have been given medicine  to sedate you during your procedure today. This may have included both a pain medicine and sleeping medicine. Most of the effects have worn off; however, you may continue to have some drowsiness for the next  24 hours. Anesthesia and pain medicines can cause nausea, sleepiness, dizziness and  constipation.    HOME CARE:  1) For the next EIGHT HOURS, you should be watched by a responsible adult to look for any worsening of your condition.  2) DO NOT DRINK any ALCOHOL for the next 24 HOURS.  3) DO NOT DRIVE or operate dangerous machinery during the next 24 HOURS.  FOLLOW UP with your doctor or this facility if you are not alert and back to your usual level of activity within 24 hrs.  GET PROMPT MEDICAL ATTENTION if any of the following occur:  -- Increased drowsiness  -- Increased weakness or dizziness  -- Repeated vomiting  -- If you cannot be awakened    Discharge Instructions for Cataract Surgery    DR. LR:  OFFICE [phone]  CELL [phone] PAGE [phone]  USE DROPS AS INSTRUCTED:    PREDNISOLONE  ONE DROP 6TIMES A DAY  Levofloxacin ONE DROP 4 TIMES A DAY  KETEROLAC ONE DROP 4 TIMES A DAY   WAIT 5 MINUTES BETWEEN DROPS     BRING ALL EYE DROPS TO YOUR CLINIC VISITS  YOU MAY TAKE TYLENOL EXTRA STRENGTH FOR PAIN. IF PAIN IS SEVERE AND TYLENOL DOES NOT HELP CALL THE DR.  If you use eye drops for glaucoma continue to use them.    A surgeon removed the cloudy lens in your eye and replaced it with a clear manmade lens. Be sure to have an adult family member or friend drive you home after surgery. Heres what you can expect following surgery and tips for a healthy recovery.  It is normal to have the following:  · Bruised or bloodshot eye for 7 days  · Scratchy, sandlike feeling in the eye for 2 weeks  · Tired feeling, especially during the first 24 hours  Activity  · Do not drink alcohol for at least 24 hours.  · No bending over  past your waist, straining, strenuous activity, or lifting more than 10 pounds for the first 2 weeks.  · Relax for the first 24 hours after surgery. Watching TV and reading are OK and wont harm your eye.  Eye Protection  · Do not rub your eye.  · Keep water out of the eye for the first 2 weeks.  · Sleep on your back or on your unoperated side.  · May remove eye shield when you get home, but Wear your eye shield when sleeping for 2 weeks  · Wear sunglasses for comfort as needed.  Using Eyedrops  You may be given special eyedrops or ointment. Here is one way to use eyedrops:  · Tilt your head back.  · Pull your bottom eyelid down.  · Squeeze one drop into your eye. Do not touch your eye with the bottle tip.  · Close your eyes for a few seconds.  · If you need more than one drop, wait a few minutes before adding the next one.   Call your doctor right away if you have any of the following:  · Bleeding or discharge from the eye  · Your vision suddenly becomes worse  · Pain not relieved by the pain reliever youre told to take  · Nausea or vomiting or significant coughing  · Chills or fever over 100.4°F   © 0571-1511 [facility], [address] [place] [address], [place] [address]. All rights reserved. This information is not intended as a substitute for professional medical care. Always follow your healthcare professional's instructions.

## 2018-09-05 NOTE — BRIEF OP NOTE
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]    Procedure(s) (LRB):  PHACOEMULSIFICATION, CATARACT (Right)    Anesthesia: Local MAC    Findings/Key Components:  Same as post op diagnosis    Estimated Blood Loss: * No values recorded between 9/5/2018 11:19 AM and 9/5/2018 12:30 PM *         Specimens (From admission, onward)    None            Discharge Note      SUMMARY     Admit Date: 9/5/2018    Attending Physician: Paola Post*     Discharge Physician: Paola Post*    Discharge Date: 9/5/2018     Final Diagnosis: Nuclear sclerosis, 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 The patient was discharged home in stable condition the same day.    Diet: Advance as tolerated    Follow-Up: Follow up with Dr. Post, next day, as previously scheduled  Activity as tolerated.      Activity: Per Handout Instructions    Disposition: Home or self care    Patient Instructions:   Current Discharge Medication List      CONTINUE these medications which have CHANGED    Details   prednisoLONE acetate (PRED FORTE) 1 % DrpS Place 1 drop into the right eye 6 (six) times daily. Start on day of surgery.  Qty: 5 mL, Refills: 2         CONTINUE these medications which have NOT CHANGED    Details   bisoprolol (ZEBETA) 5 MG tablet Take 1 tablet (5 mg total) by mouth once daily.  Qty: 90 tablet, Refills: 1    Associated Diagnoses: Essential hypertension      digoxin (DIGOX) 250 mcg tablet Take 1 tablet (0.25 mg total) by mouth once daily.  Qty: 90 tablet, Refills: 1      rivaroxaban (XARELTO) 15 mg Tab Take 1 tablet (15 mg total) by mouth once daily.  Qty: 90 tablet, Refills: 1    Associated Diagnoses: Decreased circulation      rOPINIRole  (REQUIP) 0.5 MG tablet Tab 1 po with evening meal, and tab 1 po at HS.  Qty: 180 tablet, Refills: 1    Associated Diagnoses: RLS (restless legs syndrome)      diphenhydramine-acetaminophen (TYLENOL PM)  mg Tab Take 2 tablets by mouth nightly.      FERREX 150 150 mg iron Cap Cap 1 po daily except Sunday.  Qty: 73 capsule, Refills: 1      HYDROcodone-acetaminophen (NORCO) 5-325 mg per tablet Take 1 tablet by mouth every 12 (twelve) hours as needed for Pain (Warning of drowsiness or confusion.).  Qty: 50 tablet, Refills: 0      ketorolac 0.5% (ACULAR) 0.5 % Drop Place 1 drop into the right eye 4 (four) times daily. Start 3 days before surgery.  Qty: 5 mL, Refills: 2      levoFLOXacin (QUIXIN) 0.5 % ophthalmic solution Place 1 drop into the right eye 4 (four) times daily. Start 1 day before cataract surgery. for 12 days  Qty: 5 mL, Refills: 0      menthol/zinc oxide (REMEDY CALAZIME SKIN PASTE TOP) Apply 1 application topically once daily. and prn barrier paste to buttocks      pentoxifylline (TRENTAL) 400 mg TbSR Take 1 tablet (400 mg total) by mouth 3 (three) times daily with meals.  Qty: 270 tablet, Refills: 1    Associated Diagnoses: Decreased circulation      pravastatin (PRAVACHOL) 10 MG tablet Take 1 tablet (10 mg total) by mouth once daily.  Qty: 90 tablet, Refills: 1    Associated Diagnoses: Dyslipidemia      psyllium 0.52 gram capsule Take 0.52 g by mouth once daily.      tamsulosin (FLOMAX) 0.4 mg Cap Take 1 capsule (0.4 mg total) by mouth once daily.  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 Avoid face, axilla, groin      vit [id number]-kitzvluc-me-B12-ALA (NUFOLA) 25-3.75-1-300 mg Cap Take 1 capsule by mouth once daily.  Qty: 90 capsule, Refills: 4         STOP taking these medications       atropine 1% (ISOPTO ATROPINE) 1 %  Drop Comments:   Reason for Stopping:               Discharge Procedure Orders (must include Diet, Follow-up, Activity)  No discharge procedures on file.

## 2018-09-06 ENCOUNTER — OFFICE VISIT (OUTPATIENT)
Dept: OPHTHALMOLOGY | Facility: CLINIC | Age: 83
End: 2018-09-06
Payer: MEDICARE

## 2018-09-06 VITALS
DIASTOLIC BLOOD PRESSURE: 60 MMHG | HEIGHT: 72 IN | WEIGHT: 165 LBS | SYSTOLIC BLOOD PRESSURE: 130 MMHG | BODY MASS INDEX: 22.35 KG/M2 | TEMPERATURE: 98 F | OXYGEN SATURATION: 98 % | RESPIRATION RATE: 20 BRPM | HEART RATE: 84 BPM

## 2018-09-06 DIAGNOSIS — H52.4 MYOPIA OF BOTH EYES WITH ASTIGMATISM AND PRESBYOPIA: ICD-10-CM

## 2018-09-06 DIAGNOSIS — H52.203 MYOPIA OF BOTH EYES WITH ASTIGMATISM AND PRESBYOPIA: ICD-10-CM

## 2018-09-06 DIAGNOSIS — H52.13 MYOPIA OF BOTH EYES WITH ASTIGMATISM AND PRESBYOPIA: ICD-10-CM

## 2018-09-06 DIAGNOSIS — Z98.890 POST-OPERATIVE STATE: Primary | ICD-10-CM

## 2018-09-06 DIAGNOSIS — H25.13 NUCLEAR SCLEROSIS, BILATERAL: ICD-10-CM

## 2018-09-06 PROCEDURE — 99213 OFFICE O/P EST LOW 20 MIN: CPT | Mod: PBBFAC,PO | Performed by: OPHTHALMOLOGY

## 2018-09-06 PROCEDURE — 99999 PR PBB SHADOW E&M-EST. PATIENT-LVL III: CPT | Mod: PBBFAC,,, | Performed by: OPHTHALMOLOGY

## 2018-09-06 PROCEDURE — 99024 POSTOP FOLLOW-UP VISIT: CPT | Mod: POP,,, | Performed by: OPHTHALMOLOGY

## 2018-09-06 NOTE — PROGRESS NOTES
HPI     85 YO male presents today for a post-op visit, Phaco w/IOL OD on   09/07/2018. He states he is doing well. He is using drops as directed.     Last edited by Jen Giraldo, PCT on 9/6/2018  8:34 AM. (History)            Assessment /Plan     For exam results, see Encounter Report.    Post-operative state    Nuclear sclerosis, bilateral    Myopia of both eyes with astigmatism and presbyopia          POD #1 s/p Phaco/PCIOL OD with Visitec I-ring, also long phaco time due to difficulty with hydrodissection. IOP good, doing well. Continue drops QID. Precautions reviewed. RTC 1 week  Visually significant OD>OS. Will need both, otherwise will have anisometropia. Dilates poorly due to flomax - will need Atropine 3 days prior, also 10% phenylephrine am of surgery if vital signs ok.     Target emmetropia.

## 2018-09-06 NOTE — PATIENT INSTRUCTIONS
Continue drops 4x per day (Levofloxacin, Prednisolone, Ketorolac).    I recommend that you use artificial tears 2-3 times daily. Recommended brands include Systane, Refresh, Genteal, and Thera-tears. Non-preserved formulas will be gentler - they will come in small individual vials instead of a bottle.     Call MD immediately if signs of infection occur (pain, redness, blurred vision). Do not wait for next appointment.    Call MD immediately if you experience new floaters/shadows in your vision, flashes of light, or a curtain or veil appearing to in your vision.    Cell number [phone].

## 2018-09-11 NOTE — ANESTHESIA POSTPROCEDURE EVALUATION
Anesthesia Post Evaluation    Patient: Fabian Shine    Procedure(s) Performed: Procedure(s) (LRB):  PHACOEMULSIFICATION, CATARACT (Right)    Final Anesthesia Type: MAC  Patient location during evaluation: PACU  Patient participation: Yes- Able to Participate  Level of consciousness: awake and alert  Post-procedure vital signs: reviewed and stable  Pain management: adequate  Airway patency: patent  PONV status at discharge: No PONV  Anesthetic complications: no      Cardiovascular status: hemodynamically stable  Respiratory status: unassisted and room air  Hydration status: euvolemic  Follow-up not needed.        Visit Vitals  /60   Pulse 84   Temp 36.9 °C (98.4 °F) (Skin)   Resp 20   Ht 6' (1.829 m)   Wt 74.8 kg (165 lb)   SpO2 98%   BMI 22.38 kg/m²       Pain/Benjamin Score: No Data Recorded

## 2018-09-12 ENCOUNTER — OFFICE VISIT (OUTPATIENT)
Dept: OPHTHALMOLOGY | Facility: CLINIC | Age: 83
End: 2018-09-12
Payer: MEDICARE

## 2018-09-12 DIAGNOSIS — H52.203 MYOPIA OF BOTH EYES WITH ASTIGMATISM AND PRESBYOPIA: ICD-10-CM

## 2018-09-12 DIAGNOSIS — H52.4 MYOPIA OF BOTH EYES WITH ASTIGMATISM AND PRESBYOPIA: ICD-10-CM

## 2018-09-12 DIAGNOSIS — H52.13 MYOPIA OF BOTH EYES WITH ASTIGMATISM AND PRESBYOPIA: ICD-10-CM

## 2018-09-12 DIAGNOSIS — Z98.890 POST-OPERATIVE STATE: Primary | ICD-10-CM

## 2018-09-12 DIAGNOSIS — H25.13 NUCLEAR SCLEROSIS, BILATERAL: ICD-10-CM

## 2018-09-12 PROCEDURE — 99999 PR PBB SHADOW E&M-EST. PATIENT-LVL III: CPT | Mod: PBBFAC,,, | Performed by: OPHTHALMOLOGY

## 2018-09-12 PROCEDURE — 99024 POSTOP FOLLOW-UP VISIT: CPT | Mod: POP,,, | Performed by: OPHTHALMOLOGY

## 2018-09-12 PROCEDURE — 99213 OFFICE O/P EST LOW 20 MIN: CPT | Mod: PBBFAC,PO | Performed by: OPHTHALMOLOGY

## 2018-09-12 NOTE — PATIENT INSTRUCTIONS
Continue drops 4x/day until Sunday, then Discontinue Levofloxacin on Sunday. Start to taper prednisolone and ketorolac 3x/day for 1 week then 2x/day for 1 week then 1x/day for 1 week then stop.

## 2018-09-12 NOTE — PROGRESS NOTES
HPI     85 YO male presents today for a post-op visit OD. He states he is doing   well, no problems or complaints. He is using his drops as directed.     Last edited by Jen Giraldo, PCT on 9/12/2018 10:36 AM. (History)            Assessment /Plan     For exam results, see Encounter Report.    Post-operative state    Nuclear sclerosis, bilateral    Myopia of both eyes with astigmatism and presbyopia            POD #1 s/p Phaco/PCIOL OD with Visitec I-ring, also long phaco time due to difficulty with hydrodissection. IOP good, doing well. Continue drops QID until Sunday, then Continue Vigamox QID until gone. Taper prednisolone and ketorolac 3x/day for 1 week then 2x/day for 1 week then 1x/day for 1 week then stop.   Visually significant OD>OS. Will need both, otherwise will have anisometropia. Dilates poorly due to flomax - will need Atropine 3 days prior, also 10% phenylephrine am of surgery if vital signs ok.     Target emmetropia.

## 2018-10-11 ENCOUNTER — OFFICE VISIT (OUTPATIENT)
Dept: OPHTHALMOLOGY | Facility: CLINIC | Age: 83
End: 2018-10-11
Payer: MEDICARE

## 2018-10-11 DIAGNOSIS — H25.13 NUCLEAR SCLEROSIS, BILATERAL: Primary | ICD-10-CM

## 2018-10-11 DIAGNOSIS — H52.203 MYOPIA OF BOTH EYES WITH ASTIGMATISM AND PRESBYOPIA: ICD-10-CM

## 2018-10-11 DIAGNOSIS — H52.13 MYOPIA OF BOTH EYES WITH ASTIGMATISM AND PRESBYOPIA: ICD-10-CM

## 2018-10-11 DIAGNOSIS — H52.4 MYOPIA OF BOTH EYES WITH ASTIGMATISM AND PRESBYOPIA: ICD-10-CM

## 2018-10-11 DIAGNOSIS — Z98.890 POST-OPERATIVE STATE: ICD-10-CM

## 2018-10-11 PROCEDURE — 99999 PR PBB SHADOW E&M-EST. PATIENT-LVL IV: CPT | Mod: PBBFAC,,, | Performed by: OPHTHALMOLOGY

## 2018-10-11 PROCEDURE — 99214 OFFICE O/P EST MOD 30 MIN: CPT | Mod: PBBFAC,PO | Performed by: OPHTHALMOLOGY

## 2018-10-11 PROCEDURE — 92136 OPHTHALMIC BIOMETRY: CPT | Mod: PBBFAC,PO,LT | Performed by: OPHTHALMOLOGY

## 2018-10-11 PROCEDURE — 99024 POSTOP FOLLOW-UP VISIT: CPT | Mod: POP,,, | Performed by: OPHTHALMOLOGY

## 2018-10-11 RX ORDER — PROPARACAINE HYDROCHLORIDE 5 MG/ML
1 SOLUTION/ DROPS OPHTHALMIC
Status: CANCELLED | OUTPATIENT
Start: 2018-10-11 | End: 2018-10-11

## 2018-10-11 RX ORDER — LIDOCAINE HYDROCHLORIDE 40 MG/ML
1 INJECTION, SOLUTION RETROBULBAR
Status: CANCELLED | OUTPATIENT
Start: 2018-10-11

## 2018-10-11 RX ORDER — PHENYLEPHRINE HYDROCHLORIDE 100 MG/ML
1 SOLUTION/ DROPS OPHTHALMIC ONCE
Status: CANCELLED | OUTPATIENT
Start: 2018-10-11 | End: 2018-10-11

## 2018-10-11 RX ORDER — TROPICAMIDE 10 MG/ML
1 SOLUTION/ DROPS OPHTHALMIC
Status: CANCELLED | OUTPATIENT
Start: 2018-10-11

## 2018-10-11 RX ORDER — MOXIFLOXACIN 5 MG/ML
1 SOLUTION/ DROPS OPHTHALMIC
Status: CANCELLED | OUTPATIENT
Start: 2018-10-11 | End: 2018-10-11

## 2018-10-11 RX ORDER — LIDOCAINE HYDROCHLORIDE 40 MG/ML
1 INJECTION, SOLUTION RETROBULBAR
Status: CANCELLED | OUTPATIENT
Start: 2018-10-11 | End: 2018-10-11

## 2018-10-11 RX ORDER — KETOROLAC TROMETHAMINE 5 MG/ML
1 SOLUTION OPHTHALMIC 4 TIMES DAILY
Qty: 5 ML | Refills: 2 | Status: SHIPPED | OUTPATIENT
Start: 2018-10-28 | End: 2018-11-29 | Stop reason: ALTCHOICE

## 2018-10-11 RX ORDER — SODIUM CHLORIDE 9 MG/ML
INJECTION, SOLUTION INTRAVENOUS CONTINUOUS
Status: CANCELLED | OUTPATIENT
Start: 2018-10-11

## 2018-10-11 RX ORDER — CYCLOPENTOLATE HYDROCHLORIDE 10 MG/ML
1 SOLUTION/ DROPS OPHTHALMIC
Status: CANCELLED | OUTPATIENT
Start: 2018-10-11

## 2018-10-11 RX ORDER — PREDNISOLONE ACETATE 10 MG/ML
1 SUSPENSION/ DROPS OPHTHALMIC 4 TIMES DAILY
Qty: 5 ML | Refills: 2 | Status: SHIPPED | OUTPATIENT
Start: 2018-10-11 | End: 2018-11-29 | Stop reason: ALTCHOICE

## 2018-10-11 RX ORDER — PHENYLEPHRINE HYDROCHLORIDE 25 MG/ML
1 SOLUTION/ DROPS OPHTHALMIC
Status: CANCELLED | OUTPATIENT
Start: 2018-10-11

## 2018-10-11 RX ORDER — LEVOFLOXACIN 5 MG/ML
1 SOLUTION OPHTHALMIC 4 TIMES DAILY
Qty: 5 ML | Refills: 0 | Status: SHIPPED | OUTPATIENT
Start: 2018-10-30 | End: 2018-11-11

## 2018-10-11 RX ORDER — ATROPINE SULFATE 10 MG/ML
1 SOLUTION/ DROPS OPHTHALMIC 4 TIMES DAILY
Qty: 2 ML | Refills: 0 | Status: ON HOLD
Start: 2018-10-28 | End: 2018-10-31 | Stop reason: HOSPADM

## 2018-10-11 RX ORDER — PROPARACAINE HYDROCHLORIDE 5 MG/ML
1 SOLUTION/ DROPS OPHTHALMIC
Status: CANCELLED | OUTPATIENT
Start: 2018-10-11

## 2018-10-11 NOTE — H&P (VIEW-ONLY)
HPI     Post-op Evaluation      Additional comments: 1 month OD              Comments     85 YO male presents today for a post-op visit, Phaco w/IOL OD on   09/07/2018. He states he is doing well, no problems or complaints.           Last edited by RICO Arango on 10/11/2018  1:04 PM. (History)        ROS     Positive for: Neurological (restless legs; denies hx seizure),   Genitourinary (flomax), Cardiovascular (PVD, CAD, HTN - controlled with   meds per wife.), Heme/Lymph (xarelto)    Negative for: Endocrine (denies DM), Eyes (denies surgery/trauma),   Respiratory (denies astma/orthopnea)    Last edited by Paola Post MD on 10/11/2018  1:48 PM.   (History)        Assessment /Plan     For exam results, see Encounter Report.    Nuclear sclerosis, bilateral  -     Vital signs; Standing  -     Diet NPO; Standing  -     Case Request Operating Room: phaco/pciol  -     IOL Master - OS - Left Eye  -     Place in Outpatient; Standing    Post-operative state    Myopia of both eyes with astigmatism and presbyopia    Other orders  -     0.9%  NaCl infusion; Inject into the vein continuous.  -     proparacaine 0.5 % ophthalmic solution 1 drop; Place 1 drop into the left eye On call Procedure for Other (Surgery).  -     tropicamide 1% ophthalmic solution 1 drop; Place 1 drop into the left eye On call Procedure (Surgery).  -     phenylephrine HCL 2.5% ophthalmic solution 1 drop; Place 1 drop into the left eye On call Procedure for Irritation (Surgery).  -     cyclopentolate 1% ophthalmic solution 1 drop; Place 1 drop into the left eye On call Procedure for Other (Surgery).  -     phenylephrine HCL 10% ophthalmic solution 1 drop; Place 1 drop into the left eye once.  -     moxifloxacin 0.5 % ophthalmic solution 1 drop; Place 1 drop into the left eye every 5 (five) minutes.  -     proparacaine 0.5 % ophthalmic solution 1 drop; Place 1 drop into the left eye every 5 (five) minutes.  -     lidocaine (PF) 40  mg/mL (4 %) injection 4 mg; 0.1 mLs (4 mg total) by Other route every 5 (five) minutes.  -     lidocaine (PF) 40 mg/mL (4 %) injection 4 mg; 0.1 mLs (4 mg total) by Other route as needed (eye pain).  -     prednisoLONE acetate (PRED FORTE) 1 % DrpS; Place 1 drop into the left eye 4 (four) times daily. Start on day of surgery.  Dispense: 5 mL; Refill: 2  -     levoFLOXacin (QUIXIN) 0.5 % ophthalmic solution; Place 1 drop into the left eye 4 (four) times daily. Start 1 day before cataract surgery. for 12 days  Dispense: 5 mL; Refill: 0  -     ketorolac 0.5% (ACULAR) 0.5 % Drop; Place 1 drop into the left eye 4 (four) times daily. Start 3 days before surgery.  Dispense: 5 mL; Refill: 2  -     atropine 1% (ISOPTO ATROPINE) 1 % Drop; Place 1 drop into the left eye 4 (four) times daily. Start 3 days before surgery. for 3 days  Dispense: 2 mL; Refill: 0            1 month s/p Phaco/PCIOL OD with Visitec I-ring, also long phaco time due to difficulty with hydrodissection. IOP good, doing well. Pre-op done today for OS.    Target emmetropia.

## 2018-10-11 NOTE — PROGRESS NOTES
HPI     Post-op Evaluation      Additional comments: 1 month OD              Comments     87 YO male presents today for a post-op visit, Phaco w/IOL OD on   09/07/2018. He states he is doing well, no problems or complaints.           Last edited by RICO Arango on 10/11/2018  1:04 PM. (History)        ROS     Positive for: Neurological (restless legs; denies hx seizure),   Genitourinary (flomax), Cardiovascular (PVD, CAD, HTN - controlled with   meds per wife.), Heme/Lymph (xarelto)    Negative for: Endocrine (denies DM), Eyes (denies surgery/trauma),   Respiratory (denies astma/orthopnea)    Last edited by Paola Post MD on 10/11/2018  1:48 PM.   (History)        Assessment /Plan     For exam results, see Encounter Report.    Nuclear sclerosis, bilateral  -     Vital signs; Standing  -     Diet NPO; Standing  -     Case Request Operating Room: phaco/pciol  -     IOL Master - OS - Left Eye  -     Place in Outpatient; Standing    Post-operative state    Myopia of both eyes with astigmatism and presbyopia    Other orders  -     0.9%  NaCl infusion; Inject into the vein continuous.  -     proparacaine 0.5 % ophthalmic solution 1 drop; Place 1 drop into the left eye On call Procedure for Other (Surgery).  -     tropicamide 1% ophthalmic solution 1 drop; Place 1 drop into the left eye On call Procedure (Surgery).  -     phenylephrine HCL 2.5% ophthalmic solution 1 drop; Place 1 drop into the left eye On call Procedure for Irritation (Surgery).  -     cyclopentolate 1% ophthalmic solution 1 drop; Place 1 drop into the left eye On call Procedure for Other (Surgery).  -     phenylephrine HCL 10% ophthalmic solution 1 drop; Place 1 drop into the left eye once.  -     moxifloxacin 0.5 % ophthalmic solution 1 drop; Place 1 drop into the left eye every 5 (five) minutes.  -     proparacaine 0.5 % ophthalmic solution 1 drop; Place 1 drop into the left eye every 5 (five) minutes.  -     lidocaine (PF) 40  mg/mL (4 %) injection 4 mg; 0.1 mLs (4 mg total) by Other route every 5 (five) minutes.  -     lidocaine (PF) 40 mg/mL (4 %) injection 4 mg; 0.1 mLs (4 mg total) by Other route as needed (eye pain).  -     prednisoLONE acetate (PRED FORTE) 1 % DrpS; Place 1 drop into the left eye 4 (four) times daily. Start on day of surgery.  Dispense: 5 mL; Refill: 2  -     levoFLOXacin (QUIXIN) 0.5 % ophthalmic solution; Place 1 drop into the left eye 4 (four) times daily. Start 1 day before cataract surgery. for 12 days  Dispense: 5 mL; Refill: 0  -     ketorolac 0.5% (ACULAR) 0.5 % Drop; Place 1 drop into the left eye 4 (four) times daily. Start 3 days before surgery.  Dispense: 5 mL; Refill: 2  -     atropine 1% (ISOPTO ATROPINE) 1 % Drop; Place 1 drop into the left eye 4 (four) times daily. Start 3 days before surgery. for 3 days  Dispense: 2 mL; Refill: 0            1 month s/p Phaco/PCIOL OD with Visitec I-ring, also long phaco time due to difficulty with hydrodissection. IOP good, doing well. Pre-op done today for OS.    Target emmetropia.

## 2018-10-12 ENCOUNTER — TELEPHONE (OUTPATIENT)
Dept: SURGERY | Facility: CLINIC | Age: 83
End: 2018-10-12

## 2018-10-12 NOTE — TELEPHONE ENCOUNTER
----- Message from Sonia Luu sent at 10/12/2018 12:50 PM CDT -----  Contact: Kassy Shine (Spouse)  Type: Needs Medical Advice    Who Called:  Kassy Shine (Spouse)  Symptoms (please be specific):  na  How long has patient had these symptoms:  na  Pharmacy name and phone #:  na  Best Call Back Number:   Additional Information: Calling to find out if it is time to schedule a f/u appt for the patient. Please advise.

## 2018-10-12 NOTE — TELEPHONE ENCOUNTER
I spoke to patients wife and she stated that he's doing well.  He needs a 3 month f/u appointment, which I scheduled on Thursday, 10/25/18 at 9am in [place].  [place]is

## 2018-10-25 ENCOUNTER — OFFICE VISIT (OUTPATIENT)
Dept: SURGERY | Facility: CLINIC | Age: 83
End: 2018-10-25
Payer: MEDICARE

## 2018-10-25 ENCOUNTER — LAB VISIT (OUTPATIENT)
Dept: LAB | Facility: HOSPITAL | Age: 83
End: 2018-10-25
Attending: SURGERY
Payer: MEDICARE

## 2018-10-25 VITALS
SYSTOLIC BLOOD PRESSURE: 122 MMHG | HEART RATE: 78 BPM | WEIGHT: 168 LBS | HEIGHT: 72 IN | BODY MASS INDEX: 22.75 KG/M2 | DIASTOLIC BLOOD PRESSURE: 66 MMHG | TEMPERATURE: 98 F

## 2018-10-25 DIAGNOSIS — C20 RECTAL CANCER: Primary | ICD-10-CM

## 2018-10-25 DIAGNOSIS — C20 RECTAL CANCER: ICD-10-CM

## 2018-10-25 LAB — CEA SERPL-MCNC: 4.3 NG/ML

## 2018-10-25 PROCEDURE — 99212 OFFICE O/P EST SF 10 MIN: CPT | Mod: S$PBB,,, | Performed by: SURGERY

## 2018-10-25 PROCEDURE — 36415 COLL VENOUS BLD VENIPUNCTURE: CPT | Mod: PO

## 2018-10-25 PROCEDURE — 99213 OFFICE O/P EST LOW 20 MIN: CPT | Mod: PBBFAC,PO | Performed by: SURGERY

## 2018-10-25 PROCEDURE — 82378 CARCINOEMBRYONIC ANTIGEN: CPT

## 2018-10-25 PROCEDURE — 99999 PR PBB SHADOW E&M-EST. PATIENT-LVL III: CPT | Mod: PBBFAC,,, | Performed by: SURGERY

## 2018-10-25 NOTE — PROGRESS NOTES
Pleasant 85 yo M who is s/p proctectomy in 5/18 for a mid rectal cancer that was T2NO.  Pt had colostomy given functional status.  He is feeling well.  Denies pain. No n/v.  No fever/chills.      AFVSS  AAox3  CTA B  Sinus  Soft/nt/nd  Ostomy inplace    No results found for: CEA  A/P: 85 yo M s/p proctectomy with colostomy  Pt doing well.    Check cea today  RTC in 3 months

## 2018-10-30 ENCOUNTER — ANESTHESIA EVENT (OUTPATIENT)
Dept: SURGERY | Facility: AMBULARY SURGERY CENTER | Age: 83
End: 2018-10-30
Payer: MEDICARE

## 2018-10-30 NOTE — ANESTHESIA PREPROCEDURE EVALUATION
10/30/2018  Fabian Shine is a 86 y.o., male.    Anesthesia Evaluation    I have reviewed the Patient Summary Reports.    I have reviewed the Nursing Notes.   I have reviewed the Medications.     Review of Systems  Anesthesia Hx:  Hx of Anesthetic complications General anesthetics causing adverse effect in therapeutic use Personal Hx of Anesthesia complications   Social:  Non-Smoker Smoking Status: Never Smoker  Smokeless Tobacco Status: Never Used  Alcohol use: Yes; 0.0 oz per week  Drug use: No       Hematology/Oncology:         Rectal Current/Recent Cancer. Oncology Comments: Rectal cancer s/p Partial proctectomy with end colostomy    Cardiovascular:   Hypertension Dysrhythmias atrial fibrillation  Peripheral Arterial Disease  Hypertension    Hepatic/GI:  Bowel Conditions:        Physical Exam  General:  Cachexia    Airway/Jaw/Neck:  Airway Findings: Mouth Opening: Normal Tongue: Normal  General Airway Assessment: Adult, Good  Mallampati: II  Improves to II with phonation.  TM Distance: 4-6 cm      Dental:  Dental Findings: In tact   Chest/Lungs:  Chest/Lungs Findings: Clear to auscultation, Normal Respiratory Rate     Heart/Vascular:  Heart Findings: Rate: Normal  Rhythm: Regular Rhythm  Sounds: Normal  Heart murmur: negative       Mental Status:  Mental Status Findings:  Cooperative, Alert and Oriented         Anesthesia Plan  Type of Anesthesia, risks & benefits discussed:  Anesthesia Type:  MAC  Patient's Preference:   Intra-op Monitoring Plan: standard ASA monitors  Intra-op Monitoring Plan Comments:   Post Op Pain Control Plan:   Post Op Pain Control Plan Comments:   Induction:   IV  Beta Blocker:  Patient is not currently on a Beta-Blocker (No further documentation required).       Informed Consent: Patient understands risks and agrees with Anesthesia plan.  Questions answered. Anesthesia consent  signed with patient.  ASA Score: 4     Day of Surgery Review of History & Physical: I have interviewed and examined the patient. I have reviewed the patient's H&P dated:  There are no significant changes.  H&P update referred to the surgeon.         Ready For Surgery From Anesthesia Perspective.                                                                                                                  10/31/2018  Fabian Shine is a 86 y.o., male.    Anesthesia Evaluation      I have reviewed the Medications.     Review of Systems  Anesthesia Hx:  No problems with previous Anesthesia   Social:  Non-Smoker, No Alcohol Use    Cardiovascular:   Hypertension Dysrhythmias atrial fibrillation CHF PVD (left LE angioplasty 8/17) hyperlipidemia    Pulmonary:  Pulmonary Normal    Renal/:  Renal/ Normal     Hepatic/GI:  Hepatic/GI Normal    Neurological:  Neurology Normal RLS   Endocrine:  Endocrine Normal        Physical Exam  General:  Well nourished    Airway/Jaw/Neck:  Airway Findings: Mouth Opening: Normal General Airway Assessment: Adult  Mallampati: II  Jaw/Neck Findings:  Neck ROM: Extension Decreased, Mild       Chest/Lungs:  Chest/Lungs Findings: Clear to auscultation, Normal Respiratory Rate     Heart/Vascular:  Heart Findings: Rate: Normal  Rhythm: Irregularly Irregular  Sounds: Normal  Heart murmur: negative Vascular Findings: Normal (No carotid bruits.)       Mental Status:  Mental Status Findings:  Cooperative, Alert and Oriented         Anesthesia Plan  Type of Anesthesia, risks & benefits discussed:  Anesthesia Type:  MAC  Patient's Preference:   Intra-op Monitoring Plan:   Intra-op Monitoring Plan Comments:   Post Op Pain Control Plan:   Post Op Pain Control Plan Comments:   Induction:    Beta Blocker:  Patient is on a Beta-Blocker and has received one dose within the past 24 hours (No further documentation required).       Informed Consent: Patient understands risks and agrees with Anesthesia plan.   Questions answered. Anesthesia consent signed with patient.  ASA Score: 3     Day of Surgery Review of History & Physical:            Ready For Surgery From Anesthesia Perspective.                                                                                                                  10/31/2018  Fabian Shine is a 86 y.o., male.    Anesthesia Evaluation      I have reviewed the Medications.     Review of Systems  Anesthesia Hx:  No problems with previous Anesthesia   Social:  Non-Smoker, No Alcohol Use    Cardiovascular:   Hypertension Dysrhythmias atrial fibrillation CHF PVD (left LE angioplasty 8/17) hyperlipidemia    Pulmonary:  Pulmonary Normal    Renal/:  Renal/ Normal     Hepatic/GI:  Hepatic/GI Normal    Neurological:  Neurology Normal RLS   Endocrine:  Endocrine Normal        Physical Exam  General:  Well nourished    Airway/Jaw/Neck:  Airway Findings: Mouth Opening: Normal General Airway Assessment: Adult  Mallampati: II  Jaw/Neck Findings:  Neck ROM: Extension Decreased, Mild       Chest/Lungs:  Chest/Lungs Findings: Clear to auscultation, Normal Respiratory Rate     Heart/Vascular:  Heart Findings: Rate: Normal  Rhythm: Irregularly Irregular  Sounds: Normal  Heart murmur: negative Vascular Findings: Normal (No carotid bruits.)       Mental Status:  Mental Status Findings:  Cooperative, Alert and Oriented         Anesthesia Plan  Type of Anesthesia, risks & benefits discussed:  Anesthesia Type:  MAC  Patient's Preference:   Intra-op Monitoring Plan:   Intra-op Monitoring Plan Comments:   Post Op Pain Control Plan:   Post Op Pain Control Plan Comments:   Induction:    Beta Blocker:  Patient is on a Beta-Blocker and has received one dose within the past 24 hours (No further documentation required).       Informed Consent: Patient understands risks and agrees with Anesthesia plan.  Questions answered. Anesthesia consent signed with patient.  ASA Score: 3     Day of Surgery Review of History &  Physical:            Ready For Surgery From Anesthesia Perspective.

## 2018-10-31 ENCOUNTER — ANESTHESIA (OUTPATIENT)
Dept: SURGERY | Facility: AMBULARY SURGERY CENTER | Age: 83
End: 2018-10-31
Payer: MEDICARE

## 2018-10-31 ENCOUNTER — HOSPITAL ENCOUNTER (OUTPATIENT)
Facility: AMBULARY SURGERY CENTER | Age: 83
Discharge: HOME | End: 2018-10-31
Attending: OPHTHALMOLOGY | Admitting: OPHTHALMOLOGY
Payer: MEDICARE

## 2018-10-31 DIAGNOSIS — H25.13 NUCLEAR SCLEROSIS, BILATERAL: ICD-10-CM

## 2018-10-31 DIAGNOSIS — T44.6X5A TAMSULOSIN-ASSOCIATED INTRAOPERATIVE FLOPPY IRIS SYNDROME (IFIS): Primary | ICD-10-CM

## 2018-10-31 DIAGNOSIS — H21.81 TAMSULOSIN-ASSOCIATED INTRAOPERATIVE FLOPPY IRIS SYNDROME (IFIS): Primary | ICD-10-CM

## 2018-10-31 PROCEDURE — D9220A PRA ANESTHESIA: Mod: CRNA,,, | Performed by: NURSE ANESTHETIST, CERTIFIED REGISTERED

## 2018-10-31 PROCEDURE — C9447 INJ, PHENYLEPHRINE KETOROLAC: HCPCS | Performed by: OPHTHALMOLOGY

## 2018-10-31 PROCEDURE — G8907 PT DOC NO EVENTS ON DISCHARG: HCPCS | Performed by: OPHTHALMOLOGY

## 2018-10-31 PROCEDURE — D9220A PRA ANESTHESIA: Mod: ANES,,, | Performed by: ANESTHESIOLOGY

## 2018-10-31 PROCEDURE — 66984 XCAPSL CTRC RMVL W/O ECP: CPT | Performed by: OPHTHALMOLOGY

## 2018-10-31 PROCEDURE — 63600175 PHARM REV CODE 636 W HCPCS: Performed by: OPHTHALMOLOGY

## 2018-10-31 PROCEDURE — 66984 XCAPSL CTRC RMVL W/O ECP: CPT | Mod: 79,LT,, | Performed by: OPHTHALMOLOGY

## 2018-10-31 DEVICE — LENS 12.0: Type: IMPLANTABLE DEVICE | Site: EYE | Status: FUNCTIONAL

## 2018-10-31 RX ORDER — PHENYLEPHRINE HYDROCHLORIDE 100 MG/ML
1 SOLUTION/ DROPS OPHTHALMIC ONCE
Status: COMPLETED | OUTPATIENT
Start: 2018-10-31 | End: 2018-10-31

## 2018-10-31 RX ORDER — SODIUM CHLORIDE 0.9 % (FLUSH) 0.9 %
3 SYRINGE (ML) INJECTION
Status: DISCONTINUED | OUTPATIENT
Start: 2018-10-31 | End: 2018-10-31 | Stop reason: HOSPADM

## 2018-10-31 RX ORDER — LIDOCAINE HYDROCHLORIDE 10 MG/ML
1 INJECTION, SOLUTION EPIDURAL; INFILTRATION; INTRACAUDAL; PERINEURAL ONCE
Status: DISCONTINUED | OUTPATIENT
Start: 2018-10-31 | End: 2018-10-31 | Stop reason: HOSPADM

## 2018-10-31 RX ORDER — MOXIFLOXACIN 5 MG/ML
1 SOLUTION/ DROPS OPHTHALMIC
Status: COMPLETED | OUTPATIENT
Start: 2018-10-31 | End: 2018-10-31

## 2018-10-31 RX ORDER — CYCLOPENTOLATE HYDROCHLORIDE 10 MG/ML
1 SOLUTION/ DROPS OPHTHALMIC
Status: DISCONTINUED | OUTPATIENT
Start: 2018-10-31 | End: 2018-10-31 | Stop reason: HOSPADM

## 2018-10-31 RX ORDER — LIDOCAINE HYDROCHLORIDE 40 MG/ML
1 INJECTION, SOLUTION RETROBULBAR
Status: COMPLETED | OUTPATIENT
Start: 2018-10-31 | End: 2018-10-31

## 2018-10-31 RX ORDER — PROPARACAINE HYDROCHLORIDE 5 MG/ML
SOLUTION/ DROPS OPHTHALMIC
Status: DISCONTINUED | OUTPATIENT
Start: 2018-10-31 | End: 2018-10-31 | Stop reason: HOSPADM

## 2018-10-31 RX ORDER — MOXIFLOXACIN 5 MG/ML
SOLUTION/ DROPS OPHTHALMIC
Status: DISCONTINUED | OUTPATIENT
Start: 2018-10-31 | End: 2018-10-31 | Stop reason: HOSPADM

## 2018-10-31 RX ORDER — ACETAMINOPHEN 325 MG/1
650 TABLET ORAL EVERY 6 HOURS PRN
Status: DISCONTINUED | OUTPATIENT
Start: 2018-10-31 | End: 2018-10-31 | Stop reason: HOSPADM

## 2018-10-31 RX ORDER — SODIUM CHLORIDE 0.9 % (FLUSH) 0.9 %
3 SYRINGE (ML) INJECTION EVERY 8 HOURS
Status: DISCONTINUED | OUTPATIENT
Start: 2018-10-31 | End: 2018-10-31 | Stop reason: HOSPADM

## 2018-10-31 RX ORDER — SODIUM CHLORIDE, SODIUM LACTATE, POTASSIUM CHLORIDE, CALCIUM CHLORIDE 600; 310; 30; 20 MG/100ML; MG/100ML; MG/100ML; MG/100ML
10 INJECTION, SOLUTION INTRAVENOUS CONTINUOUS
Status: DISCONTINUED | OUTPATIENT
Start: 2018-11-01 | End: 2018-10-31 | Stop reason: HOSPADM

## 2018-10-31 RX ORDER — SODIUM CHLORIDE, SODIUM LACTATE, POTASSIUM CHLORIDE, CALCIUM CHLORIDE 600; 310; 30; 20 MG/100ML; MG/100ML; MG/100ML; MG/100ML
INJECTION, SOLUTION INTRAVENOUS CONTINUOUS
Status: DISCONTINUED | OUTPATIENT
Start: 2018-10-31 | End: 2018-10-31 | Stop reason: HOSPADM

## 2018-10-31 RX ORDER — MIDAZOLAM HYDROCHLORIDE 1 MG/ML
INJECTION INTRAMUSCULAR; INTRAVENOUS
Status: COMPLETED
Start: 2018-10-31 | End: 2018-10-31

## 2018-10-31 RX ORDER — PROPARACAINE HYDROCHLORIDE 5 MG/ML
1 SOLUTION/ DROPS OPHTHALMIC
Status: DISCONTINUED | OUTPATIENT
Start: 2018-10-31 | End: 2018-10-31 | Stop reason: HOSPADM

## 2018-10-31 RX ORDER — LIDOCAINE HYDROCHLORIDE 10 MG/ML
INJECTION, SOLUTION EPIDURAL; INFILTRATION; INTRACAUDAL; PERINEURAL
Status: DISCONTINUED | OUTPATIENT
Start: 2018-10-31 | End: 2018-10-31 | Stop reason: HOSPADM

## 2018-10-31 RX ORDER — ONDANSETRON 2 MG/ML
4 INJECTION INTRAMUSCULAR; INTRAVENOUS ONCE AS NEEDED
Status: DISCONTINUED | OUTPATIENT
Start: 2018-10-31 | End: 2018-10-31 | Stop reason: HOSPADM

## 2018-10-31 RX ORDER — PHENYLEPHRINE HYDROCHLORIDE 25 MG/ML
1 SOLUTION/ DROPS OPHTHALMIC
Status: DISCONTINUED | OUTPATIENT
Start: 2018-10-31 | End: 2018-10-31 | Stop reason: HOSPADM

## 2018-10-31 RX ORDER — TROPICAMIDE 10 MG/ML
1 SOLUTION/ DROPS OPHTHALMIC
Status: DISCONTINUED | OUTPATIENT
Start: 2018-10-31 | End: 2018-10-31 | Stop reason: HOSPADM

## 2018-10-31 RX ORDER — SODIUM CHLORIDE 9 MG/ML
INJECTION, SOLUTION INTRAVENOUS CONTINUOUS
Status: DISCONTINUED | OUTPATIENT
Start: 2018-10-31 | End: 2018-10-31 | Stop reason: HOSPADM

## 2018-10-31 RX ORDER — MIDAZOLAM HYDROCHLORIDE 1 MG/ML
INJECTION, SOLUTION INTRAMUSCULAR; INTRAVENOUS
Status: DISCONTINUED | OUTPATIENT
Start: 2018-10-31 | End: 2018-10-31

## 2018-10-31 RX ORDER — FENTANYL CITRATE 50 UG/ML
INJECTION, SOLUTION INTRAMUSCULAR; INTRAVENOUS
Status: COMPLETED
Start: 2018-10-31 | End: 2018-10-31

## 2018-10-31 RX ORDER — PROPARACAINE HYDROCHLORIDE 5 MG/ML
1 SOLUTION/ DROPS OPHTHALMIC
Status: COMPLETED | OUTPATIENT
Start: 2018-10-31 | End: 2018-10-31

## 2018-10-31 RX ORDER — FENTANYL CITRATE 50 UG/ML
INJECTION, SOLUTION INTRAMUSCULAR; INTRAVENOUS
Status: DISCONTINUED | OUTPATIENT
Start: 2018-10-31 | End: 2018-10-31

## 2018-10-31 RX ORDER — ONDANSETRON 2 MG/ML
INJECTION INTRAMUSCULAR; INTRAVENOUS
Status: DISCONTINUED | OUTPATIENT
Start: 2018-10-31 | End: 2018-10-31

## 2018-10-31 RX ORDER — ONDANSETRON 2 MG/ML
INJECTION INTRAMUSCULAR; INTRAVENOUS
Status: COMPLETED
Start: 2018-10-31 | End: 2018-10-31

## 2018-10-31 RX ORDER — LIDOCAINE HYDROCHLORIDE 40 MG/ML
1 INJECTION, SOLUTION RETROBULBAR
Status: DISCONTINUED | OUTPATIENT
Start: 2018-10-31 | End: 2018-10-31 | Stop reason: HOSPADM

## 2018-10-31 RX ADMIN — SODIUM CHLORIDE, SODIUM LACTATE, POTASSIUM CHLORIDE, CALCIUM CHLORIDE 10 ML/HR: 600; 310; 30; 20 INJECTION, SOLUTION INTRAVENOUS at 07:10

## 2018-10-31 RX ADMIN — MOXIFLOXACIN 1 DROP: 5 SOLUTION/ DROPS OPHTHALMIC at 07:10

## 2018-10-31 RX ADMIN — PROPARACAINE HYDROCHLORIDE 1 DROP: 5 SOLUTION/ DROPS OPHTHALMIC at 08:10

## 2018-10-31 RX ADMIN — PHENYLEPHRINE HYDROCHLORIDE 1 DROP: 100 SOLUTION/ DROPS OPHTHALMIC at 08:10

## 2018-10-31 RX ADMIN — FENTANYL CITRATE 25 MCG: 50 INJECTION, SOLUTION INTRAMUSCULAR; INTRAVENOUS at 08:10

## 2018-10-31 RX ADMIN — PROPARACAINE HYDROCHLORIDE 1 DROP: 5 SOLUTION/ DROPS OPHTHALMIC at 07:10

## 2018-10-31 RX ADMIN — TROPICAMIDE 1 DROP: 10 SOLUTION/ DROPS OPHTHALMIC at 07:10

## 2018-10-31 RX ADMIN — LIDOCAINE HYDROCHLORIDE 1 DROP: 40 INJECTION, SOLUTION RETROBULBAR at 08:10

## 2018-10-31 RX ADMIN — CYCLOPENTOLATE HYDROCHLORIDE 1 DROP: 10 SOLUTION/ DROPS OPHTHALMIC at 07:10

## 2018-10-31 RX ADMIN — PHENYLEPHRINE HYDROCHLORIDE 1 DROP: 25 SOLUTION/ DROPS OPHTHALMIC at 07:10

## 2018-10-31 RX ADMIN — FENTANYL CITRATE 50 MCG: 50 INJECTION, SOLUTION INTRAMUSCULAR; INTRAVENOUS at 08:10

## 2018-10-31 RX ADMIN — FENTANYL CITRATE 25 MCG: 50 INJECTION, SOLUTION INTRAMUSCULAR; INTRAVENOUS at 09:10

## 2018-10-31 RX ADMIN — MIDAZOLAM HYDROCHLORIDE 0.5 MG: 1 INJECTION, SOLUTION INTRAMUSCULAR; INTRAVENOUS at 09:10

## 2018-10-31 RX ADMIN — ONDANSETRON 4 MG: 2 INJECTION INTRAMUSCULAR; INTRAVENOUS at 08:10

## 2018-10-31 RX ADMIN — MIDAZOLAM HYDROCHLORIDE 1 MG: 1 INJECTION, SOLUTION INTRAMUSCULAR; INTRAVENOUS at 08:10

## 2018-10-31 RX ADMIN — MIDAZOLAM HYDROCHLORIDE 0.5 MG: 1 INJECTION, SOLUTION INTRAMUSCULAR; INTRAVENOUS at 08:10

## 2018-10-31 NOTE — ANESTHESIA POSTPROCEDURE EVALUATION
Anesthesia Post Evaluation    Patient: Fabian Shine    Procedure(s) Performed: Procedure(s) (LRB):  phaco/pciol (Left)    Final Anesthesia Type: MAC  Patient location during evaluation: PACU  Patient participation: Yes- Able to Participate  Level of consciousness: awake and alert and oriented  Post-procedure vital signs: reviewed and stable  Pain management: adequate  Airway patency: patent  PONV status at discharge: No PONV  Anesthetic complications: no      Cardiovascular status: blood pressure returned to baseline  Respiratory status: unassisted, spontaneous ventilation and room air  Hydration status: euvolemic  Follow-up not needed.        Visit Vitals  /66   Pulse 78   Temp 36.5 °C (97.7 °F) (Skin)   Resp 17   Ht 6' (1.829 m)   Wt 76.2 kg (168 lb)   SpO2 98%   BMI 22.78 kg/m²       Pain/Benjamin Score: Pain Assessment Performed: Yes (10/31/2018  9:20 AM)  Presence of Pain: denies (10/31/2018  9:20 AM)  Benjamin Score: 9 (10/31/2018  9:20 AM)

## 2018-10-31 NOTE — INTERVAL H&P NOTE
CC: H&P for cataract surgery  HPI: Patient has been diagnosed with cataracts, which are interfering with daily activities due to blurred vision and glare which cannot adequately be corrected with glasses.   PMH:  Past Medical History:   Diagnosis Date    A-fib     Anticoagulant long-term use     Arthritis     Bladder tumor     Cancer     bladder    Cancer 05/2018    colon    Cataracts, bilateral     General anesthetics causing adverse effect in therapeutic use     hallucinations for several days    Hypertension     PAD (peripheral artery disease)     RLS (restless legs syndrome)     Supraventricular arrhythmia     Wears glasses        FH:  Family History   Problem Relation Age of Onset    Heart disease Father        SH:  Social History     Socioeconomic History    Marital status:      Spouse name: Not on file    Number of children: Not on file    Years of education: Not on file    Highest education level: Not on file   Social Needs    Financial resource strain: Not on file    Food insecurity - worry: Not on file    Food insecurity - inability: Not on file    Transportation needs - medical: Not on file    Transportation needs - non-medical: Not on file   Occupational History    Not on file   Tobacco Use    Smoking status: Never Smoker    Smokeless tobacco: Never Used   Substance and Sexual Activity    Alcohol use: Yes     Alcohol/week: 0.0 oz     Comment: RARELY    Drug use: No    Sexual activity: Not on file   Other Topics Concern    Not on file   Social History Narrative    Not on file       ROS:  HEENT: Blurred vision  Gen: denies fever/chills  CV: denies chest pain  Pulm: denies SOB  GI: denies nausea/vomiting  Please see my last exam note for additional ROS details    PE:  Vitals:    10/31/18 0737   BP: (!) 121/58   Pulse: 88   Resp: 20   Temp: 98.2 °F (36.8 °C)     HEENT: Cataract  CV: RRR, no M/R/G  Pulm: CTA B  Abd: soft, nontender, normal bowel sounds  Extremeties: no  edema    A/P  1. Cataract - Indications, risks and alternatives to the procedure were explained to the patient. The patient was given the opportunity to ask questions and consented to the procedure in writing.  Proceed with cataract surgery as scheduled.  2. Other health issues - patient has been cleared by their PCP. See last note for details.

## 2018-10-31 NOTE — PLAN OF CARE
"Dc criteria met. Denies pain. Stares"ready to go home and  get a milkshake!" Home via private vehicle with wife.  "

## 2018-10-31 NOTE — OP NOTE
Date of surgery: 10/31/2018    Operative Report    Preoperative Diagnosis: Nuclear sclerosis, left eye    Postoperative Diagnosis: Nuclear sclerosis, left eye, intraoperative floppy iris syndrome    Procedure: Phacoemulsification with posterior chamber intraocular lens, left eye with use of Omidria    Surgeon: Paola Post M.D.    Anesthesia: MAC with topical    Procedure in detail:   Informed consent was obtained. Indications, risks and alternatives to the procedure were explained to the patient. The patient was given the opportunity to ask questions and consented to the procedure in writing. In the preoperative holding area, the patients identity and operative site were confirmed.  Topical anesthetic was administered, consisting of alternating 0.5% Proparacaine and 4% preservative-free Lidocaine Q 5 minutes times 3.  The patient was taken to the operative suite.  A time-out was performed.  The left eye was prepped with 5% Betadine and draped in sterile fashion.  A lid speculum was placed.  A paracentesis was made at the 4 oclock position. 1% preservative-free lidocaine was placed in the anterior chamber, followed by Viscoat.  A bi-planar clear corneal incision was made at the 2 oclock position.  The cystotome was used to begin the continuous curvilinear capsulorrhexis, which was completed with the Utrata forceps.  BSS on a blunt-tipped cannula was used to hydrodissect and hydrodelineate the lens nucleus until it was noted to rotate freely.  Phacoemulsification was used in a divide-and-conquer technique to remove the lens nucleus, followed by irrigation and aspiration of the lens cortex.  The capsular bag was inflated with Healon.  The lens, which was an Salvatore Acrysoft IQ IOL, model SN60WF, power 12.0, was placed in the capsular bag and rotated into position.  The remaining viscoelastic material was removed by I&A.  The wound and paracentesis were hydrated.  The lens was centered.  The anterior chamber was  deep.  The eye pressure was good.  The wounds were checked and watertight.  The lid speculum and drape were removed.  A drop of Vigamox was placed in the eye.  A clear shield was taped in place over the eye.    TOTAL ULTRASOUND TIME:  1:01.9    PERCENTAGE OF TIME IN POSITION 3: 16.3 %    Estimated blood loss:  none    Specimens:   none  Complications:  none    Disposition:   stable to PACU

## 2018-10-31 NOTE — BRIEF OP NOTE
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]    Procedure(s) (LRB):  phaco/pciol (Left)    Anesthesia: Local MAC    Findings/Key Components:  Same as post op diagnosis    Estimated Blood Loss: * No values recorded between 10/31/2018  8:55 AM and 10/31/2018  9:18 AM *         Specimens (From admission, onward)    None            Discharge Note      SUMMARY     Admit Date: 10/31/2018    Attending Physician: Paola Post*     Discharge Physician: Paola Post*    Discharge Date: 10/31/2018     Final Diagnosis: Nuclear sclerosis, 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 The patient was discharged home in stable condition the same day.    Diet: Advance as tolerated    Follow-Up: Follow up with Dr. Post, next day, as previously scheduled  Activity as tolerated.      Activity: Per Handout Instructions    Disposition: Home or self care    Patient Instructions:   Current Discharge Medication List      CONTINUE these medications which have NOT CHANGED    Details   bisoprolol (ZEBETA) 5 MG tablet Take 1 tablet (5 mg total) by mouth once daily.  Qty: 90 tablet, Refills: 1    Associated Diagnoses: Essential hypertension      digoxin (DIGOX) 250 mcg tablet Take 1 tablet (0.25 mg total) by mouth once daily.  Qty: 90 tablet, Refills: 1      diphenhydramine-acetaminophen (TYLENOL PM)  mg Tab Take 2 tablets by mouth nightly.      FERREX 150 150 mg iron Cap Cap 1 po daily except Sunday.  Qty: 73 capsule, Refills: 1      HYDROcodone-acetaminophen (NORCO) 5-325 mg per tablet Take 1 tablet by mouth every 12 (twelve) hours as needed for Pain (Warning of drowsiness or confusion.).  Qty: 50 tablet, Refills: 0      ketorolac 0.5%  (ACULAR) 0.5 % Drop Place 1 drop into the left eye 4 (four) times daily. Start 3 days before surgery.  Qty: 5 mL, Refills: 2      levoFLOXacin (QUIXIN) 0.5 % ophthalmic solution Place 1 drop into the left eye 4 (four) times daily. Start 1 day before cataract surgery. for 12 days  Qty: 5 mL, Refills: 0      menthol/zinc oxide (REMEDY CALAZIME SKIN PASTE TOP) Apply 1 application topically once daily. and prn barrier paste to buttocks      pentoxifylline (TRENTAL) 400 mg TbSR Take 1 tablet (400 mg total) by mouth 3 (three) times daily with meals.  Qty: 270 tablet, Refills: 1    Associated Diagnoses: Decreased circulation      pravastatin (PRAVACHOL) 10 MG tablet Take 1 tablet (10 mg total) by mouth once daily.  Qty: 90 tablet, Refills: 1    Associated Diagnoses: Dyslipidemia      prednisoLONE acetate (PRED FORTE) 1 % DrpS Place 1 drop into the left eye 4 (four) times daily. Start on day of surgery.  Qty: 5 mL, Refills: 2      psyllium 0.52 gram capsule Take 0.52 g by mouth once daily.      rivaroxaban (XARELTO) 15 mg Tab Take 1 tablet (15 mg total) by mouth once daily.  Qty: 90 tablet, Refills: 1    Associated Diagnoses: Decreased circulation      rOPINIRole (REQUIP) 0.5 MG tablet Tab 1 po with evening meal, and tab 1 po at HS.  Qty: 180 tablet, Refills: 1    Associated Diagnoses: RLS (restless legs syndrome)      tamsulosin (FLOMAX) 0.4 mg Cap Take 1 capsule (0.4 mg total) by mouth once daily.  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 Avoid face, axilla, groin      vit Y1-ab-djnusrak-me-B12-ALA (NUFOLA) 25-3.75-1-300 mg Cap Take 1 capsule by mouth once daily.  Qty: 90 capsule, Refills: 4         STOP taking these medications       atropine 1% (ISOPTO ATROPINE) 1 % Drop Comments:   Reason for Stopping:               Discharge Procedure  Orders (must include Diet, Follow-up, Activity)  No discharge procedures on file.

## 2018-10-31 NOTE — DISCHARGE INSTRUCTIONS
Discharge Instructions for Cataract Surgery    DR. LR:  OFFICE [phone]  CELL [phone] PAGE [phone]  USE DROPS AS INSTRUCTED:    PREDNISOLONE  ONE DROP 4 TIMES A DAY  LEVOFLOAXIN ONE DROP 4 TIMES A DAY  KETEROLAC ONE DROP 4 TIMES A DAY   WAIT 5 MINUTES BETWEEN DROPS     BRING ALL EYE DROPS TO YOUR CLINIC VISITS  YOU MAY TAKE TYLENOL EXTRA STRENGTH FOR PAIN. IF PAIN IS SEVERE AND TYLENOL DOES NOT HELP CALL THE DR.  If you use eye drops for glaucoma continue to use them.    A surgeon removed the cloudy lens in your eye and replaced it with a clear manmade lens. Be sure to have an adult family member or friend drive you home after surgery. Heres what you can expect following surgery and tips for a healthy recovery.  It is normal to have the following:  · Bruised or bloodshot eye for 7 days  · Scratchy, sandlike feeling in the eye for 2 weeks  · Tired feeling, especially during the first 24 hours  Activity  · Do not drink alcohol for at least 24 hours.  · No bending straining or lifting more than 10 pounds for the first 2 weeks.  · Relax for the first 24 hours after surgery. Watching TV and reading are OK and wont harm your eye.  Eye Protection  · Do not rub your eye.  · Keep water out of the eye for the first 2 weeks.  · Sleep on your back or on your unoperated side.  · Wear your eye shield when sleeping.  · Wear sunglasses for comfort as needed.  Using Eyedrops  You may be given special eyedrops or ointment. Here is one way to use eyedrops:  · Tilt your head back.  · Pull your bottom eyelid down.  · Squeeze one drop into your eye. Do not touch your eye with the bottle tip.  · Close your eyes for a few seconds.  · If you need more than one drop, wait a few minutes before adding the next one.   Call your doctor right away if you have any of the following:  · Bleeding or discharge from the eye  · Your vision suddenly becomes worse  · Pain not relieved by the pain reliever youre told to  take  · Nausea or vomiting  · Chills or fever over 100.4°F   © 7608-6918 [employer], [address] [facility], [place],       Recovery After Procedural Sedation (Adult)  You have been given medicine by vein to make you sleep during your surgery. This may have included both a pain medicine and sleeping medicine. Most of the effects have worn off. But you may still have some drowsiness for the next 6 to 8 hours.  Home care  Follow these guidelines when you get home:  · For the next 8 hours, you should be watched by a responsible adult. This person should make sure your condition is not getting worse.  · Don't drink any alcohol for the next 24 hours.  · Don't drive, operate dangerous machinery, or make important business or personal decisions during the next 24 hours.  Note: Your healthcare provider may tell you not to take any medicine by mouth for pain or sleep in the next 4 hours. These medicines may react with the medicines you were given in the hospital. This could cause a much stronger response than usual.  Follow-up care  Follow up with your healthcare provider if you are not alert and back to your usual level of activity within 12 hours.  When to seek medical advice  Call your healthcare provider right away if any of these occur:  · Drowsiness gets worse  · Weakness or dizziness gets worse  · Repeated vomiting  · You can't be awakened   Date Last Reviewed: 10/18/2016  © 0293-0522 [employer]. [address] [place] [address]. All rights reserved. This information is not intended as a substitute for professional medical care. Always follow your healthcare professional's instructions.

## 2018-10-31 NOTE — TRANSFER OF CARE
Anesthesia Transfer of Care Note    Patient: Fabian Shine    Procedure(s) Performed: Procedure(s) (LRB):  phaco/pciol (Left)    Patient location: PACU    Anesthesia Type: MAC    Transport from OR: Transported from OR on room air with adequate spontaneous ventilation    Post pain: adequate analgesia    Post assessment: no apparent anesthetic complications and tolerated procedure well    Post vital signs: stable    Level of consciousness: awake, alert and oriented    Nausea/Vomiting: no nausea/vomiting    Complications: none    Transfer of care protocol was followed      Last vitals:   Visit Vitals  BP (!) 121/58   Pulse 88   Temp 36.8 °C (98.2 °F) (Skin)   Resp 20   Ht 6' (1.829 m)   Wt 76.2 kg (168 lb)   SpO2 95%   BMI 22.78 kg/m²

## 2018-11-01 ENCOUNTER — OFFICE VISIT (OUTPATIENT)
Dept: OPHTHALMOLOGY | Facility: CLINIC | Age: 83
End: 2018-11-01
Payer: MEDICARE

## 2018-11-01 VITALS
HEIGHT: 72 IN | OXYGEN SATURATION: 98 % | RESPIRATION RATE: 17 BRPM | TEMPERATURE: 98 F | SYSTOLIC BLOOD PRESSURE: 108 MMHG | BODY MASS INDEX: 22.75 KG/M2 | HEART RATE: 78 BPM | WEIGHT: 168 LBS | DIASTOLIC BLOOD PRESSURE: 66 MMHG

## 2018-11-01 DIAGNOSIS — H52.7 REFRACTIVE ERROR: ICD-10-CM

## 2018-11-01 DIAGNOSIS — Z98.890 POST-OPERATIVE STATE: Primary | ICD-10-CM

## 2018-11-01 DIAGNOSIS — Z96.1 PSEUDOPHAKIA, BOTH EYES: ICD-10-CM

## 2018-11-01 PROBLEM — H25.13 NUCLEAR SCLEROSIS, BILATERAL: Status: RESOLVED | Noted: 2018-09-05 | Resolved: 2018-11-01

## 2018-11-01 PROCEDURE — 99999 PR PBB SHADOW E&M-EST. PATIENT-LVL III: CPT | Mod: PBBFAC,,, | Performed by: OPHTHALMOLOGY

## 2018-11-01 PROCEDURE — 99024 POSTOP FOLLOW-UP VISIT: CPT | Mod: POP,,, | Performed by: OPHTHALMOLOGY

## 2018-11-01 PROCEDURE — 99213 OFFICE O/P EST LOW 20 MIN: CPT | Mod: PBBFAC,PO | Performed by: OPHTHALMOLOGY

## 2018-11-01 NOTE — PATIENT INSTRUCTIONS
Continue drops 4x per day (Levofloxacin, Prednisolone, Ketorolac).    Call MD immediately if signs of infection occur (pain, redness, blurred vision). Do not wait for next appointment.    Call MD immediately if you experience new floaters/shadows in your vision, flashes of light, or a curtain or veil appearing to in your vision.    Cell number [phone].

## 2018-11-01 NOTE — PROGRESS NOTES
HPI     87 YO female presents today for a post-op visit. 1 day post-op OS. He   states that he is doing well, some scratchiness but otherwise doing well.       Last edited by Jen Giraldo, PCT on 11/1/2018  8:07 AM. (History)            Assessment /Plan     For exam results, see Encounter Report.    Post-operative state    Pseudophakia, both eyes    Refractive error          POD #1 s/p Phaco/PCIOL OS on 10/31/2018. Doing well. Able to do with just Omidria. Continue drops QID. Precautions reviewed. RTC 1 week  s/p Phaco/PCIOL OD with Visitec I-ring, doing well.     [facility].

## 2018-11-02 ENCOUNTER — TELEPHONE (OUTPATIENT)
Dept: SURGERY | Facility: CLINIC | Age: 83
End: 2018-11-02

## 2018-11-02 NOTE — TELEPHONE ENCOUNTER
----- Message from Dhaval Corderorian sent at 11/2/2018 10:05 AM CDT -----  Contact: Erma with [employer]   Erma with [employer] need to speak with a nurse regarding the ostimy supplies, please call back at [phone]

## 2018-11-07 ENCOUNTER — TELEPHONE (OUTPATIENT)
Dept: UROLOGY | Facility: CLINIC | Age: 83
End: 2018-11-07

## 2018-11-07 DIAGNOSIS — N30.00 ACUTE CYSTITIS WITHOUT HEMATURIA: ICD-10-CM

## 2018-11-07 DIAGNOSIS — Z85.51 HX OF BLADDER CANCER: Primary | ICD-10-CM

## 2018-11-07 NOTE — TELEPHONE ENCOUNTER
----- Message from Nolberto Suarez sent at 11/7/2018 12:59 PM CST -----  Type: Needs Medical Advice    Who Called:  Wife  Best Call Back Number: [phone] (home)   Additional Information: Wife would like call from Katie - please call to advise

## 2018-11-07 NOTE — TELEPHONE ENCOUNTER
Spoke to pt and schedule the cysto and the urine culture and urinalysis one week prior to the cysto to check for an infection they have done this in the past. She verbalized understanding of the apts.

## 2018-11-07 NOTE — PROGRESS NOTES
Assessment /Plan     For exam results, see Encounter Report.    Post-operative state    Pseudophakia, both eyes    Refractive error            POD #8 s/p Phaco/PCIOL OS on 10/31/2018. Doing well. Starting Friday, discontinue Levofloxacin. Taper prednisolone and ketorolac 3x/day for 1 week then 2x/day for 1 week then 1x/day for 1 week then stop.  s/p Phaco/PCIOL OD with Visitec I-ring, doing well.     [employer] [facility].

## 2018-11-08 ENCOUNTER — OFFICE VISIT (OUTPATIENT)
Dept: OPHTHALMOLOGY | Facility: CLINIC | Age: 83
End: 2018-11-08
Payer: MEDICARE

## 2018-11-08 DIAGNOSIS — H52.7 REFRACTIVE ERROR: ICD-10-CM

## 2018-11-08 DIAGNOSIS — Z98.890 POST-OPERATIVE STATE: Primary | ICD-10-CM

## 2018-11-08 DIAGNOSIS — Z96.1 PSEUDOPHAKIA, BOTH EYES: ICD-10-CM

## 2018-11-08 PROCEDURE — 99999 PR PBB SHADOW E&M-EST. PATIENT-LVL III: CPT | Mod: PBBFAC,,, | Performed by: OPHTHALMOLOGY

## 2018-11-08 PROCEDURE — 99213 OFFICE O/P EST LOW 20 MIN: CPT | Mod: PBBFAC,PO | Performed by: OPHTHALMOLOGY

## 2018-11-08 PROCEDURE — 99024 POSTOP FOLLOW-UP VISIT: CPT | Mod: POP,,, | Performed by: OPHTHALMOLOGY

## 2018-11-08 NOTE — PATIENT INSTRUCTIONS
Starting Friday, discontinue Levofloxacin. Taper prednisolone and ketorolac 3x/day for 1 week then 2x/day for 1 week then 1x/day for 1 week then stop.

## 2018-11-29 ENCOUNTER — OFFICE VISIT (OUTPATIENT)
Dept: OPHTHALMOLOGY | Facility: CLINIC | Age: 83
End: 2018-11-29
Payer: MEDICARE

## 2018-11-29 DIAGNOSIS — Z96.1 PSEUDOPHAKIA, BOTH EYES: ICD-10-CM

## 2018-11-29 DIAGNOSIS — Z98.890 POST-OPERATIVE STATE: Primary | ICD-10-CM

## 2018-11-29 DIAGNOSIS — H52.7 REFRACTIVE ERROR: ICD-10-CM

## 2018-11-29 PROCEDURE — 99999 PR PBB SHADOW E&M-EST. PATIENT-LVL III: CPT | Mod: PBBFAC,,, | Performed by: OPHTHALMOLOGY

## 2018-11-29 PROCEDURE — 99024 POSTOP FOLLOW-UP VISIT: CPT | Mod: POP,,, | Performed by: OPHTHALMOLOGY

## 2018-11-29 PROCEDURE — 99213 OFFICE O/P EST LOW 20 MIN: CPT | Mod: PBBFAC,PO | Performed by: OPHTHALMOLOGY

## 2018-11-29 NOTE — PROGRESS NOTES
HPI     87 YO male presents today for a post-op visit, 1 month CE OS. He states   he is doing well, C/O trouble with reading vision and would like a   prescription for it.     Last edited by RICO Arango on 11/29/2018  9:20 AM. (History)        ROS     Positive for: Neurological (restless legs; denies hx seizure),   Genitourinary (flomax), Cardiovascular (PVD, CAD, HTN - controlled with   meds per wife.), Heme/Lymph (xarelto)    Negative for: Endocrine (denies DM), Eyes (denies surgery/trauma),   Respiratory (denies astma/orthopnea)    Last edited by Paola Post MD on 11/29/2018  9:58 AM.   (History)        Assessment /Plan     For exam results, see Encounter Report.    Post-operative state    Pseudophakia, both eyes    Refractive error            1 month s/p Phaco/PCIOL OS on 10/31/2018. Doing well.   s/p Phaco/PCIOL OD with Visitec I-ring, doing well.     [facility] em[facility].     F/u annually - optometry ok.

## 2018-12-03 ENCOUNTER — LAB VISIT (OUTPATIENT)
Dept: LAB | Facility: HOSPITAL | Age: 83
End: 2018-12-03
Attending: UROLOGY
Payer: MEDICARE

## 2018-12-03 DIAGNOSIS — N30.00 ACUTE CYSTITIS WITHOUT HEMATURIA: ICD-10-CM

## 2018-12-03 DIAGNOSIS — Z85.51 HX OF BLADDER CANCER: ICD-10-CM

## 2018-12-03 LAB
BILIRUB UR QL STRIP: NEGATIVE
CLARITY UR: CLEAR
COLOR UR: YELLOW
GLUCOSE UR QL STRIP: NEGATIVE
HGB UR QL STRIP: NEGATIVE
KETONES UR QL STRIP: NEGATIVE
LEUKOCYTE ESTERASE UR QL STRIP: NEGATIVE
NITRITE UR QL STRIP: NEGATIVE
PH UR STRIP: 6 [PH] (ref 5–8)
PROT UR QL STRIP: NEGATIVE
SP GR UR STRIP: 1.01 (ref 1–1.03)
URN SPEC COLLECT METH UR: NORMAL

## 2018-12-03 PROCEDURE — 81003 URINALYSIS AUTO W/O SCOPE: CPT | Mod: PO

## 2018-12-03 PROCEDURE — 87086 URINE CULTURE/COLONY COUNT: CPT

## 2018-12-04 ENCOUNTER — TELEPHONE (OUTPATIENT)
Dept: UROLOGY | Facility: CLINIC | Age: 83
End: 2018-12-04

## 2018-12-04 LAB — BACTERIA UR CULT: NORMAL

## 2018-12-04 NOTE — TELEPHONE ENCOUNTER
----- Message from Flaquito Chester sent at 12/4/2018  3:41 PM CST -----  Contact: self   Placed call to pod, patient miss call from your office please call back at [phone] (odwn)

## 2018-12-13 ENCOUNTER — PROCEDURE VISIT (OUTPATIENT)
Dept: UROLOGY | Facility: CLINIC | Age: 83
End: 2018-12-13
Payer: MEDICARE

## 2018-12-13 VITALS
BODY MASS INDEX: 23.03 KG/M2 | SYSTOLIC BLOOD PRESSURE: 119 MMHG | WEIGHT: 170 LBS | DIASTOLIC BLOOD PRESSURE: 66 MMHG | HEIGHT: 72 IN | HEART RATE: 91 BPM

## 2018-12-13 DIAGNOSIS — Z85.51 HX OF BLADDER CANCER: ICD-10-CM

## 2018-12-13 LAB
BILIRUB SERPL-MCNC: NORMAL MG/DL
BLOOD URINE, POC: NORMAL
COLOR, POC UA: YELLOW
GLUCOSE UR QL STRIP: NORMAL
KETONES UR QL STRIP: NORMAL
LEUKOCYTE ESTERASE URINE, POC: NORMAL
NITRITE, POC UA: NORMAL
PH, POC UA: 7
PROTEIN, POC: NORMAL
SPECIFIC GRAVITY, POC UA: 1.01
UROBILINOGEN, POC UA: NORMAL

## 2018-12-13 PROCEDURE — 52234 CYSTOSCOPY AND TREATMENT: CPT | Mod: S$PBB,,, | Performed by: UROLOGY

## 2018-12-13 PROCEDURE — 52234 CYSTOSCOPY AND TREATMENT: CPT | Mod: PBBFAC,PO | Performed by: UROLOGY

## 2018-12-13 PROCEDURE — 81002 URINALYSIS NONAUTO W/O SCOPE: CPT | Mod: PBBFAC,PO | Performed by: UROLOGY

## 2018-12-13 NOTE — PROCEDURES
"Cystoscopy with fulgeration of suspected recurrence  Date/Time: 12/13/2018 12:54 PM  Performed by: WISAM Mccall MD  Authorized by: WISAM Mccall MD     Consent Done?:  Yes (Written)  Time out: Immediately prior to procedure a "time out" was called to verify the correct patient, procedure, equipment, support staff and site/side marked as required.    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 He completed a course of BCG. He is asymptomatic today. Cysto 6 months ago was negative except one suspicious area. Last recurrence 4/14. He has no complaints today.  He dysuria.      The flexible cystoscope was placed into the urethra and carefully advanced into the bladder.  A careful cystoscopic exam was then performed.  The entire bladder mucosa was systematically visualized.   Findings include moderate bladder wall trabeculation.  There were multiple small recurrences on right posterior wall.  Otherwise the mucosa appeared normal   Each ureteral orifices were visualized and both had clear efflux of urine.  I advanced the bugbee through the scope and fulgurated all suspicious lesions.  All lesion were less than 1 cm.  The cystoscope was then removed and I examined the entire length of the urethra.  There was moderate trilobar enlargement of the prostate otherwise the urethra appeared normal.  He tolerated the procedure well.  There were no complications     Impression:  ?Recurrence.  All area treated with bugbee.  We will continue to  observe for now.  Follow up in 6 months with another cystoscopy.        "

## 2019-01-24 ENCOUNTER — LAB VISIT (OUTPATIENT)
Dept: LAB | Facility: HOSPITAL | Age: 84
End: 2019-01-24
Attending: SURGERY
Payer: MEDICARE

## 2019-01-24 ENCOUNTER — OFFICE VISIT (OUTPATIENT)
Dept: SURGERY | Facility: CLINIC | Age: 84
End: 2019-01-24
Payer: MEDICARE

## 2019-01-24 VITALS
WEIGHT: 169.75 LBS | BODY MASS INDEX: 22.99 KG/M2 | HEIGHT: 72 IN | TEMPERATURE: 97 F | SYSTOLIC BLOOD PRESSURE: 117 MMHG | HEART RATE: 88 BPM | DIASTOLIC BLOOD PRESSURE: 58 MMHG

## 2019-01-24 DIAGNOSIS — Z85.048 HISTORY OF RECTAL CANCER: Primary | ICD-10-CM

## 2019-01-24 DIAGNOSIS — Z85.048 HISTORY OF RECTAL CANCER: ICD-10-CM

## 2019-01-24 LAB — CEA SERPL-MCNC: 4.6 NG/ML

## 2019-01-24 PROCEDURE — 99999 PR PBB SHADOW E&M-EST. PATIENT-LVL III: CPT | Mod: PBBFAC,,, | Performed by: SURGERY

## 2019-01-24 PROCEDURE — 36415 COLL VENOUS BLD VENIPUNCTURE: CPT | Mod: PO

## 2019-01-24 PROCEDURE — 99213 OFFICE O/P EST LOW 20 MIN: CPT | Mod: PBBFAC,PO | Performed by: SURGERY

## 2019-01-24 PROCEDURE — 99999 PR PBB SHADOW E&M-EST. PATIENT-LVL III: ICD-10-PCS | Mod: PBBFAC,,, | Performed by: SURGERY

## 2019-01-24 PROCEDURE — 82378 CARCINOEMBRYONIC ANTIGEN: CPT

## 2019-01-24 PROCEDURE — 99212 PR OFFICE/OUTPT VISIT, EST, LEVL II, 10-19 MIN: ICD-10-PCS | Mod: S$PBB,,, | Performed by: SURGERY

## 2019-01-24 PROCEDURE — 99212 OFFICE O/P EST SF 10 MIN: CPT | Mod: S$PBB,,, | Performed by: SURGERY

## 2019-01-24 NOTE — PROGRESS NOTES
Pleasant 87 yo M who is s/p proctectomy in 5/18 for a mid rectal cancer that was T2NO.  Pt had colostomy given functional status.  He is feeling well.  Denies pain. No n/v.  No fever/chills. He reports his appetite and weight have improved.  Minimal incisional tenderness.    Wt Readings from Last 3 Encounters:   01/24/19 77 kg (169 lb 12.1 oz)   12/13/18 77.1 kg (170 lb)   10/30/18 76.2 kg (168 lb)     Temp Readings from Last 3 Encounters:   01/24/19 97.3 °F (36.3 °C) (Oral)   10/31/18 97.7 °F (36.5 °C) (Skin)   10/25/18 97.7 °F (36.5 °C) (Oral)     BP Readings from Last 3 Encounters:   01/24/19 (!) 117/58   12/13/18 119/66   10/31/18 108/66     Pulse Readings from Last 3 Encounters:   01/24/19 88   12/13/18 91   10/31/18 78     AAOx3  CTA B  Soft/nd/nt      Lab Results   Component Value Date    CEA 4.3 10/25/2018       A?P: History of ST I rectal cancer  Doing well. RTC in 3 months  Will plan cscope at that time

## 2019-02-14 PROBLEM — Z79.01 ANTICOAGULANT LONG-TERM USE: Status: ACTIVE | Noted: 2019-02-14

## 2019-02-14 PROBLEM — L85.3 DRY SKIN DERMATITIS: Status: ACTIVE | Noted: 2019-02-14

## 2019-04-25 ENCOUNTER — LAB VISIT (OUTPATIENT)
Dept: LAB | Facility: HOSPITAL | Age: 84
End: 2019-04-25
Attending: SURGERY
Payer: MEDICARE

## 2019-04-25 ENCOUNTER — OFFICE VISIT (OUTPATIENT)
Dept: SURGERY | Facility: CLINIC | Age: 84
End: 2019-04-25
Payer: MEDICARE

## 2019-04-25 VITALS
DIASTOLIC BLOOD PRESSURE: 58 MMHG | HEIGHT: 72 IN | TEMPERATURE: 97 F | HEART RATE: 95 BPM | SYSTOLIC BLOOD PRESSURE: 101 MMHG | WEIGHT: 176 LBS | BODY MASS INDEX: 23.84 KG/M2

## 2019-04-25 DIAGNOSIS — C20 RECTAL CANCER: ICD-10-CM

## 2019-04-25 DIAGNOSIS — C20 RECTAL CANCER: Primary | ICD-10-CM

## 2019-04-25 LAB — CEA SERPL-MCNC: 4.7 NG/ML (ref 0–5)

## 2019-04-25 PROCEDURE — 99212 PR OFFICE/OUTPT VISIT, EST, LEVL II, 10-19 MIN: ICD-10-PCS | Mod: S$PBB,,, | Performed by: SURGERY

## 2019-04-25 PROCEDURE — 99999 PR PBB SHADOW E&M-EST. PATIENT-LVL III: CPT | Mod: PBBFAC,,, | Performed by: SURGERY

## 2019-04-25 PROCEDURE — 99999 PR PBB SHADOW E&M-EST. PATIENT-LVL III: ICD-10-PCS | Mod: PBBFAC,,, | Performed by: SURGERY

## 2019-04-25 PROCEDURE — 82378 CARCINOEMBRYONIC ANTIGEN: CPT

## 2019-04-25 PROCEDURE — 99213 OFFICE O/P EST LOW 20 MIN: CPT | Mod: PBBFAC,PO | Performed by: SURGERY

## 2019-04-25 PROCEDURE — 36415 COLL VENOUS BLD VENIPUNCTURE: CPT | Mod: PO

## 2019-04-25 PROCEDURE — 99212 OFFICE O/P EST SF 10 MIN: CPT | Mod: S$PBB,,, | Performed by: SURGERY

## 2019-04-25 NOTE — PROGRESS NOTES
Pleasant 88 yo M who is s/p proctectomy in 5/18 for a mid rectal cancer that was T2NO.  Pt had colostomy given functional status.  He is feeling well.  Denies pain. No n/v.  No fever/chills. He reports his appetite and weight have improved. States that he is getting more and more active.    Pt has not had a colonoscopy since surgery.      AFVSS  AAox3  Soft/nt/nd  2+ pulses B      Lab Results   Component Value Date    CEA 4.6 01/24/2019       A/P: History of rectal cancer  Check cea today  REfer to Dr Redmond for colonoscopy through stoma and also eval of rectal stump.   RTC in 3 months

## 2019-04-25 NOTE — Clinical Note
LIZ. He has a colostomy so will need scope through colostomy and also rectal stump.  Let me know if you have questions Thx

## 2019-05-21 PROBLEM — Z12.11 SPECIAL SCREENING FOR MALIGNANT NEOPLASM OF COLON: Status: ACTIVE | Noted: 2019-05-21

## 2019-05-22 PROBLEM — T44.6X5A TAMSULOSIN-ASSOCIATED INTRAOPERATIVE FLOPPY IRIS SYNDROME (IFIS): Chronic | Status: ACTIVE | Noted: 2018-09-05

## 2019-05-22 PROBLEM — H21.81 TAMSULOSIN-ASSOCIATED INTRAOPERATIVE FLOPPY IRIS SYNDROME (IFIS): Chronic | Status: ACTIVE | Noted: 2018-09-05

## 2019-05-30 ENCOUNTER — TELEPHONE (OUTPATIENT)
Dept: UROLOGY | Facility: CLINIC | Age: 84
End: 2019-05-30

## 2019-05-30 DIAGNOSIS — N30.00 ACUTE CYSTITIS WITHOUT HEMATURIA: Primary | ICD-10-CM

## 2019-05-30 NOTE — TELEPHONE ENCOUNTER
----- Message from Barb Giraldo sent at 5/30/2019  8:04 AM CDT -----  Contact: Kassy  Type: Needs Medical Advice    Who Called:  wife  Best Call Back Number: [phone]  Additional Information:  Asking to speak to office regarding doing a urine specimen prior to cystoscope. Thanks!

## 2019-06-03 ENCOUNTER — LAB VISIT (OUTPATIENT)
Dept: LAB | Facility: HOSPITAL | Age: 84
End: 2019-06-03
Attending: UROLOGY
Payer: MEDICARE

## 2019-06-03 DIAGNOSIS — N30.00 ACUTE CYSTITIS WITHOUT HEMATURIA: ICD-10-CM

## 2019-06-03 LAB
BILIRUB UR QL STRIP: NEGATIVE
CLARITY UR: CLEAR
COLOR UR: YELLOW
GLUCOSE UR QL STRIP: NEGATIVE
HGB UR QL STRIP: NEGATIVE
KETONES UR QL STRIP: NEGATIVE
LEUKOCYTE ESTERASE UR QL STRIP: NEGATIVE
NITRITE UR QL STRIP: NEGATIVE
PH UR STRIP: 6 [PH] (ref 5–8)
PROT UR QL STRIP: NEGATIVE
SP GR UR STRIP: 1.02 (ref 1–1.03)
URN SPEC COLLECT METH UR: NORMAL

## 2019-06-03 PROCEDURE — 87086 URINE CULTURE/COLONY COUNT: CPT

## 2019-06-03 PROCEDURE — 81003 URINALYSIS AUTO W/O SCOPE: CPT | Mod: PO

## 2019-06-04 LAB — BACTERIA UR CULT: NORMAL

## 2019-06-13 ENCOUNTER — PROCEDURE VISIT (OUTPATIENT)
Dept: UROLOGY | Facility: CLINIC | Age: 84
End: 2019-06-13
Payer: MEDICARE

## 2019-06-13 VITALS
HEIGHT: 72 IN | SYSTOLIC BLOOD PRESSURE: 126 MMHG | HEART RATE: 95 BPM | DIASTOLIC BLOOD PRESSURE: 76 MMHG | BODY MASS INDEX: 23.84 KG/M2 | WEIGHT: 176 LBS

## 2019-06-13 DIAGNOSIS — Z85.51 HX OF BLADDER CANCER: ICD-10-CM

## 2019-06-13 PROCEDURE — 52000 CYSTOSCOPY: ICD-10-PCS | Mod: S$PBB,,, | Performed by: UROLOGY

## 2019-06-13 PROCEDURE — 52000 CYSTOURETHROSCOPY: CPT | Mod: PBBFAC,PO | Performed by: UROLOGY

## 2019-06-13 PROCEDURE — 88112 CYTOPATH CELL ENHANCE TECH: CPT | Performed by: PATHOLOGY

## 2019-06-13 PROCEDURE — 88112 CYTOLOGY SPECIMEN-URINE: ICD-10-PCS | Mod: 26,,, | Performed by: PATHOLOGY

## 2019-06-13 NOTE — PROCEDURES
"Cystoscopy  Date/Time: 6/13/2019 12:20 PM  Performed by: WISAM Mccall MD  Authorized by: WISAM Mccall MD     Consent Done?:  Yes (Written)  Time out: Immediately prior to procedure a "time out" was called to verify the correct patient, procedure, equipment, support staff and site/side marked as required.    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 He completed a course of BCG. He is asymptomatic today.  Cysto 6 months ago was negative except one suspicious area.  Last recurrence 4/14. He has no complaints today.  He denies dysuria.     The flexible cystoscope was placed into the urethra and carefully advanced into the bladder.  A careful cystoscopic exam was then performed.  The entire bladder mucosa was systematically visualized.  Findings include moderate bladder wall trabeculation.  There were no lesions, masses foreign bodies or stones.   Each ureteral orifices were visualized and both had clear efflux of urine.  Barbotage was performed and washings were collected for cytological evaluation.  The cystoscope was then removed and I examined the entire length of the urethra.  There was moderate trilobar enlargement of the prostate otherwise the urethra appeared normal.  He tolerated the procedure well.  There were no complications     Impression:  No evidence of recurrence.    Plan:  Follow-up 1 year for cystoscopy     "

## 2019-06-17 DIAGNOSIS — Z85.51 HX OF BLADDER CANCER: Primary | ICD-10-CM

## 2019-07-25 ENCOUNTER — LAB VISIT (OUTPATIENT)
Dept: LAB | Facility: HOSPITAL | Age: 84
End: 2019-07-25
Attending: SURGERY
Payer: MEDICARE

## 2019-07-25 ENCOUNTER — OFFICE VISIT (OUTPATIENT)
Dept: SURGERY | Facility: CLINIC | Age: 84
End: 2019-07-25
Payer: MEDICARE

## 2019-07-25 VITALS
DIASTOLIC BLOOD PRESSURE: 55 MMHG | BODY MASS INDEX: 23.84 KG/M2 | TEMPERATURE: 97 F | HEART RATE: 91 BPM | SYSTOLIC BLOOD PRESSURE: 114 MMHG | HEIGHT: 72 IN | WEIGHT: 176 LBS

## 2019-07-25 DIAGNOSIS — C20 RECTAL CANCER: ICD-10-CM

## 2019-07-25 DIAGNOSIS — C20 RECTAL CANCER: Primary | ICD-10-CM

## 2019-07-25 LAB — CEA SERPL-MCNC: 5.1 NG/ML (ref 0–5)

## 2019-07-25 PROCEDURE — 99212 PR OFFICE/OUTPT VISIT, EST, LEVL II, 10-19 MIN: ICD-10-PCS | Mod: S$PBB,,, | Performed by: SURGERY

## 2019-07-25 PROCEDURE — 36415 COLL VENOUS BLD VENIPUNCTURE: CPT | Mod: PO

## 2019-07-25 PROCEDURE — 99999 PR PBB SHADOW E&M-EST. PATIENT-LVL III: ICD-10-PCS | Mod: PBBFAC,,, | Performed by: SURGERY

## 2019-07-25 PROCEDURE — 99212 OFFICE O/P EST SF 10 MIN: CPT | Mod: S$PBB,,, | Performed by: SURGERY

## 2019-07-25 PROCEDURE — 82378 CARCINOEMBRYONIC ANTIGEN: CPT

## 2019-07-25 PROCEDURE — 99999 PR PBB SHADOW E&M-EST. PATIENT-LVL III: CPT | Mod: PBBFAC,,, | Performed by: SURGERY

## 2019-07-25 PROCEDURE — 99213 OFFICE O/P EST LOW 20 MIN: CPT | Mod: PBBFAC,PO | Performed by: SURGERY

## 2019-07-25 NOTE — PROGRESS NOTES
Pleasant 88 yo M who is s/p proctectomy in 5/18 for a mid rectal cancer that was T2NO.  Pt had colostomy given functional status.  He is feeling well.  Denies pain. No n/v.  No fever/chills. He reports his appetite and weight have improved. States that he is getting more and more active.    Pt had a colonoscopy with Dr Redmond since last visit with no significant findings    AFVSS  AAox3  Soft/n/tnd  2+ pulses B    Lab Results   Component Value Date    CEA 4.7 04/25/2019     A/P: HIstory of rectal cancer  Cont supportive care  F/u with me in 3 months  Check cea today

## 2019-08-21 PROBLEM — C20 RECTAL CANCER: Chronic | Status: ACTIVE | Noted: 2018-05-28

## 2019-10-24 ENCOUNTER — LAB VISIT (OUTPATIENT)
Dept: LAB | Facility: HOSPITAL | Age: 84
End: 2019-10-24
Attending: SURGERY
Payer: MEDICARE

## 2019-10-24 ENCOUNTER — OFFICE VISIT (OUTPATIENT)
Dept: SURGERY | Facility: CLINIC | Age: 84
End: 2019-10-24
Payer: MEDICARE

## 2019-10-24 VITALS
BODY MASS INDEX: 23.84 KG/M2 | DIASTOLIC BLOOD PRESSURE: 55 MMHG | TEMPERATURE: 98 F | WEIGHT: 176 LBS | HEART RATE: 72 BPM | SYSTOLIC BLOOD PRESSURE: 113 MMHG | HEIGHT: 72 IN

## 2019-10-24 DIAGNOSIS — Z85.048 HISTORY OF RECTAL CANCER: ICD-10-CM

## 2019-10-24 DIAGNOSIS — Z85.048 HISTORY OF RECTAL CANCER: Primary | ICD-10-CM

## 2019-10-24 LAB — CEA SERPL-MCNC: 5.1 NG/ML (ref 0–5)

## 2019-10-24 PROCEDURE — 99212 OFFICE O/P EST SF 10 MIN: CPT | Mod: S$PBB,,, | Performed by: SURGERY

## 2019-10-24 PROCEDURE — 36415 COLL VENOUS BLD VENIPUNCTURE: CPT | Mod: PO

## 2019-10-24 PROCEDURE — 99999 PR PBB SHADOW E&M-EST. PATIENT-LVL III: CPT | Mod: PBBFAC,,, | Performed by: SURGERY

## 2019-10-24 PROCEDURE — 99999 PR PBB SHADOW E&M-EST. PATIENT-LVL III: ICD-10-PCS | Mod: PBBFAC,,, | Performed by: SURGERY

## 2019-10-24 PROCEDURE — 99213 OFFICE O/P EST LOW 20 MIN: CPT | Mod: PBBFAC,PO | Performed by: SURGERY

## 2019-10-24 PROCEDURE — 99212 PR OFFICE/OUTPT VISIT, EST, LEVL II, 10-19 MIN: ICD-10-PCS | Mod: S$PBB,,, | Performed by: SURGERY

## 2019-10-24 PROCEDURE — 82378 CARCINOEMBRYONIC ANTIGEN: CPT

## 2019-10-26 NOTE — PROGRESS NOTES
Pleasant 86 yo M who is s/p proctectomy in 5/18 for a mid rectal cancer that was T2NO.  Pt had colostomy given functional status.  He is feeling well.  Denies pain. No n/v.  No fever/chills. He reports his appetite and weight have improved. States that he is getting more and more active.    Pt had a colonoscopy with Dr Redmond earlier this year.     AFVSS  AAOx3  CTA B  Soft/nt/nd  Ostomy pink and function.  Slight stomal hernia    Lab Results   Component Value Date    CEA 5.1 (H) 10/24/2019       A/P: History of rectal cancer  RTC in 3 months

## 2019-11-19 ENCOUNTER — OFFICE VISIT (OUTPATIENT)
Dept: OPTOMETRY | Facility: CLINIC | Age: 84
End: 2019-11-19
Payer: MEDICARE

## 2019-11-19 DIAGNOSIS — H04.123 DRY EYE SYNDROME, BILATERAL: Primary | ICD-10-CM

## 2019-11-19 DIAGNOSIS — Z96.1 PSEUDOPHAKIA: ICD-10-CM

## 2019-11-19 DIAGNOSIS — H52.7 REFRACTIVE ERROR: ICD-10-CM

## 2019-11-19 PROCEDURE — 92014 COMPRE OPH EXAM EST PT 1/>: CPT | Mod: S$PBB,,, | Performed by: OPTOMETRIST

## 2019-11-19 PROCEDURE — 99212 OFFICE O/P EST SF 10 MIN: CPT | Mod: PBBFAC,PO | Performed by: OPTOMETRIST

## 2019-11-19 PROCEDURE — 92014 PR EYE EXAM, EST PATIENT,COMPREHESV: ICD-10-PCS | Mod: S$PBB,,, | Performed by: OPTOMETRIST

## 2019-11-19 PROCEDURE — 99999 PR PBB SHADOW E&M-EST. PATIENT-LVL II: ICD-10-PCS | Mod: PBBFAC,,, | Performed by: OPTOMETRIST

## 2019-11-19 PROCEDURE — 99999 PR PBB SHADOW E&M-EST. PATIENT-LVL II: CPT | Mod: PBBFAC,,, | Performed by: OPTOMETRIST

## 2019-11-19 NOTE — PROGRESS NOTES
HPI     Annual Exam      Additional comments: DLE 11-18 (darya)   ocular health exam               Blurred Vision      Additional comments: at near only --  distance [facility] good              Headache      Additional comments: +PF ATS OU bid              Spots and/or Floaters      Additional comments: OU -- no light flashes          Last edited by Zaina Mckinney on 11/19/2019  9:06 AM. (History)            Assessment /Plan     For exam results, see Encounter Report.    Dry eye syndrome, bilateral    Pseudophakia    Refractive error      COLTEN OU. Discussed ocular affects of dry eyes. Recommend OTC preservative free artificial tears throughout day, recommend otc systane gel at bedtime, caution transient blurring of vision with gel use. Discussed chronicity of COLTEN. RTC if symptoms not alleviated by continued use of artificial tears.     S/p cataract extraction with good results. Pt happy with uncorrected distance vision and otc readers prn for near. Denies refraction. Return as desired for spec Rx.    Pt reports having dizzy spells and occasional subjective visual disturbance of left eye. Retina clear today, pt to f/u with PCP. Pt to report back if any worsening.       RTC in 1 year for comprehensive eye exam, or sooner prn.

## 2019-12-06 ENCOUNTER — TELEPHONE (OUTPATIENT)
Dept: UROLOGY | Facility: CLINIC | Age: 84
End: 2019-12-06

## 2019-12-06 ENCOUNTER — CLINICAL SUPPORT (OUTPATIENT)
Dept: UROLOGY | Facility: CLINIC | Age: 84
End: 2019-12-06
Payer: MEDICARE

## 2019-12-06 DIAGNOSIS — Z85.51 HX OF BLADDER CANCER: ICD-10-CM

## 2019-12-06 DIAGNOSIS — R31.9 HEMATURIA, UNSPECIFIED TYPE: Primary | ICD-10-CM

## 2019-12-06 DIAGNOSIS — R31.9 HEMATURIA, UNSPECIFIED TYPE: ICD-10-CM

## 2019-12-06 PROCEDURE — 88112 CYTOPATH CELL ENHANCE TECH: CPT | Performed by: PATHOLOGY

## 2019-12-06 PROCEDURE — 88112 CYTOPATH CELL ENHANCE TECH: CPT | Mod: 26,,, | Performed by: PATHOLOGY

## 2019-12-06 PROCEDURE — 88112 PR  CYTOPATH, CELL ENHANCE TECH: ICD-10-PCS | Mod: 26,,, | Performed by: PATHOLOGY

## 2019-12-06 NOTE — PROGRESS NOTES
Spoke to faina and I had to book the pt for a nurse apt to collect the urine cytology the lab already had because the lab sent over the wrong paperwork and they could not process it. Now everything is straight.

## 2019-12-06 NOTE — TELEPHONE ENCOUNTER
----- Message from Lucas Serrato sent at 12/6/2019 12:05 PM CST -----  Contact: Paige with [facility]  Type: Needs Medical Advice    Who Called:  Paige    Jostin Call Back Number: [phone]  Additional Information: Calling to get an order for a cytology urine for the pt. Please call to advise.

## 2019-12-09 LAB — FINAL PATHOLOGIC DIAGNOSIS: NORMAL

## 2020-01-23 ENCOUNTER — LAB VISIT (OUTPATIENT)
Dept: LAB | Facility: HOSPITAL | Age: 85
End: 2020-01-23
Attending: SURGERY
Payer: MEDICARE

## 2020-01-23 ENCOUNTER — OFFICE VISIT (OUTPATIENT)
Dept: SURGERY | Facility: CLINIC | Age: 85
End: 2020-01-23
Payer: MEDICARE

## 2020-01-23 VITALS
HEART RATE: 95 BPM | SYSTOLIC BLOOD PRESSURE: 126 MMHG | TEMPERATURE: 97 F | DIASTOLIC BLOOD PRESSURE: 60 MMHG | WEIGHT: 176 LBS | HEIGHT: 72 IN | BODY MASS INDEX: 23.84 KG/M2

## 2020-01-23 DIAGNOSIS — Z85.048 HISTORY OF RECTAL CANCER: ICD-10-CM

## 2020-01-23 DIAGNOSIS — Z85.048 HISTORY OF RECTAL CANCER: Primary | ICD-10-CM

## 2020-01-23 LAB — CEA SERPL-MCNC: 4.1 NG/ML (ref 0–5)

## 2020-01-23 PROCEDURE — 99999 PR PBB SHADOW E&M-EST. PATIENT-LVL III: CPT | Mod: PBBFAC,,, | Performed by: SURGERY

## 2020-01-23 PROCEDURE — 1126F PR PAIN SEVERITY QUANTIFIED, NO PAIN PRESENT: ICD-10-PCS | Mod: ,,, | Performed by: SURGERY

## 2020-01-23 PROCEDURE — 82378 CARCINOEMBRYONIC ANTIGEN: CPT

## 2020-01-23 PROCEDURE — 99213 PR OFFICE/OUTPT VISIT, EST, LEVL III, 20-29 MIN: ICD-10-PCS | Mod: S$PBB,,, | Performed by: SURGERY

## 2020-01-23 PROCEDURE — 36415 COLL VENOUS BLD VENIPUNCTURE: CPT | Mod: PO

## 2020-01-23 PROCEDURE — 99999 PR PBB SHADOW E&M-EST. PATIENT-LVL III: ICD-10-PCS | Mod: PBBFAC,,, | Performed by: SURGERY

## 2020-01-23 PROCEDURE — 99213 OFFICE O/P EST LOW 20 MIN: CPT | Mod: PBBFAC,PO | Performed by: SURGERY

## 2020-01-23 PROCEDURE — 1159F PR MEDICATION LIST DOCUMENTED IN MEDICAL RECORD: ICD-10-PCS | Mod: ,,, | Performed by: SURGERY

## 2020-01-23 PROCEDURE — 1159F MED LIST DOCD IN RCRD: CPT | Mod: ,,, | Performed by: SURGERY

## 2020-01-23 PROCEDURE — 1126F AMNT PAIN NOTED NONE PRSNT: CPT | Mod: ,,, | Performed by: SURGERY

## 2020-01-23 PROCEDURE — 99213 OFFICE O/P EST LOW 20 MIN: CPT | Mod: S$PBB,,, | Performed by: SURGERY

## 2020-01-24 NOTE — PROGRESS NOTES
Pleasant 88 yo M who is s/p proctectomy in 5/18 for a mid rectal cancer that was T2NO.  Pt had colostomy given functional status.  He is feeling well.  Denies pain. No n/v.  No fever/chills. Pt notes he is doing very well    AFVSS  AAox3  Soft/nt/nd slight parastomal hernia  Rectal: not examined    Lab Results   Component Value Date    CEA 4.1 01/23/2020     A/P: history of St I colon cancer  Doing well  Pt had cscope last year with multiple polyps removed by DR Redmond.  Will plan repeat when he return for visit next week

## 2020-01-27 PROBLEM — Z79.01 CURRENT USE OF LONG TERM ANTICOAGULATION: Status: ACTIVE | Noted: 2020-01-27

## 2020-02-17 PROBLEM — Z00.00 WELL ADULT EXAM: Status: ACTIVE | Noted: 2020-02-17

## 2020-02-17 PROBLEM — Z12.5 SCREENING FOR PROSTATE CANCER: Status: ACTIVE | Noted: 2020-02-17

## 2020-02-27 ENCOUNTER — TELEPHONE (OUTPATIENT)
Dept: SURGERY | Facility: CLINIC | Age: 85
End: 2020-02-27

## 2020-02-27 NOTE — TELEPHONE ENCOUNTER
Spoke to wife, new on-set diarrhea since Sunday no other c/o , no fever, no pain had a cramp that resolved. Colostomy in place wants to know if he can use antidiarrheal. Suggested she call PCP, Dr Huang out of town, she is hesitant to do that informed I will try to reach Dr Huang for her reassurance and will call back.

## 2020-02-27 NOTE — TELEPHONE ENCOUNTER
Dr. Huang states hesitant to medicate without him having appointment. Could be a virus so pt needs to see PCP or urgent care. Notified his wife Kassy, states she will call PCP.

## 2020-02-27 NOTE — TELEPHONE ENCOUNTER
----- Message from Ellis Evans sent at 2/27/2020  8:14 AM CST -----  Contact: Pt wife Kassy  Pt wife Kassy called and would like to have nurse Geovanny call her back    This is regarding the pt ostomy    Kassy can be reached at [phone]

## 2020-04-13 ENCOUNTER — TELEPHONE (OUTPATIENT)
Dept: SURGERY | Facility: CLINIC | Age: 85
End: 2020-04-13

## 2020-04-13 NOTE — TELEPHONE ENCOUNTER
----- Message from Debby Espinoza sent at 4/13/2020 10:31 AM CDT -----  Contact: Pt  Type: Needs Medical Advice  Who Called:  pt  Symptoms (please be specific):    How long has patient had these symptoms:    Pharmacy name and phone #:    Best Call Back Number: [phone]  Additional Information: requesting a callback in regards to lab wk

## 2020-05-18 PROBLEM — Z00.00 WELL ADULT EXAM: Status: RESOLVED | Noted: 2020-02-17 | Resolved: 2020-05-18

## 2020-06-04 ENCOUNTER — TELEPHONE (OUTPATIENT)
Dept: UROLOGY | Facility: CLINIC | Age: 85
End: 2020-06-04

## 2020-06-04 NOTE — TELEPHONE ENCOUNTER
patient's wife calling to ask if she will be allowed to accompany her . Advised they are allowing spouses now.

## 2020-06-04 NOTE — TELEPHONE ENCOUNTER
----- Message from iNmco Mcallister sent at 6/4/2020 12:13 PM CDT -----  Contact: Kassy  Type: Needs Medical Advice  Who Called:  Patient wife  Best Call Back Number: [phone] (home) [phone] (work)  Additional Information: the wife wants to speak to a nurse about the patients appt next week please call to advise.

## 2020-06-11 ENCOUNTER — PROCEDURE VISIT (OUTPATIENT)
Dept: UROLOGY | Facility: CLINIC | Age: 85
End: 2020-06-11
Payer: MEDICARE

## 2020-06-11 VITALS
WEIGHT: 176 LBS | HEART RATE: 71 BPM | HEIGHT: 72 IN | DIASTOLIC BLOOD PRESSURE: 68 MMHG | SYSTOLIC BLOOD PRESSURE: 118 MMHG | BODY MASS INDEX: 23.84 KG/M2

## 2020-06-11 DIAGNOSIS — Z85.51 HX OF BLADDER CANCER: ICD-10-CM

## 2020-06-11 DIAGNOSIS — R31.9 HEMATURIA, UNSPECIFIED TYPE: Primary | ICD-10-CM

## 2020-06-11 LAB
BILIRUB SERPL-MCNC: NORMAL MG/DL
BLOOD URINE, POC: NORMAL
CLARITY, POC UA: CLEAR
COLOR, POC UA: YELLOW
GLUCOSE UR QL STRIP: NORMAL
KETONES UR QL STRIP: NORMAL
LEUKOCYTE ESTERASE URINE, POC: NORMAL
NITRITE, POC UA: NORMAL
PH, POC UA: 5.5
PROTEIN, POC: NORMAL
SPECIFIC GRAVITY, POC UA: 1.01
UROBILINOGEN, POC UA: NORMAL

## 2020-06-11 PROCEDURE — 52000 CYSTOSCOPY: ICD-10-PCS | Mod: S$PBB,,, | Performed by: UROLOGY

## 2020-06-11 PROCEDURE — 81002 URINALYSIS NONAUTO W/O SCOPE: CPT | Mod: PBBFAC,PO | Performed by: UROLOGY

## 2020-06-11 PROCEDURE — 52000 CYSTOURETHROSCOPY: CPT | Mod: PBBFAC,PO | Performed by: UROLOGY

## 2020-06-11 NOTE — PROCEDURES
"Cystoscopy  Date/Time: 6/11/2020 1:30 PM  Performed by: WISAM Mccall MD  Authorized by: WISAM Mccall MD     Consent Done?:  Yes (Written)  Time out: Immediately prior to procedure a "time out" was called to verify the correct patient, procedure, equipment, support staff and site/side marked as required.    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 He completed a course of BCG. He is asymptomatic today. Last recurrence 4/14. He has no complaints today.  He is here for surveillance cystoscopy.     The flexible cystoscope was placed into the urethra and carefully advanced into the bladder.  A careful cystoscopic exam was then performed.  The entire bladder mucosa was systematically visualized.  Findings include moderate bladder wall trabeculation.  There were no lesions, masses foreign bodies or stones.   Each ureteral orifices were visualized and both had clear efflux of urine.  The cystoscope was then removed and I examined the entire length of the urethra.  There was moderate trilobar enlargement of the prostate otherwise the urethra appeared normal.  He tolerated the procedure well.  There were no complications      Impression:  No evidence of recurrence.     Plan:  Follow-up 1 year for cystoscopy   "

## 2020-07-16 ENCOUNTER — LAB VISIT (OUTPATIENT)
Dept: LAB | Facility: HOSPITAL | Age: 85
End: 2020-07-16
Attending: SURGERY
Payer: MEDICARE

## 2020-07-16 ENCOUNTER — OFFICE VISIT (OUTPATIENT)
Dept: SURGERY | Facility: CLINIC | Age: 85
End: 2020-07-16
Payer: MEDICARE

## 2020-07-16 VITALS
BODY MASS INDEX: 23.84 KG/M2 | HEART RATE: 74 BPM | WEIGHT: 176 LBS | SYSTOLIC BLOOD PRESSURE: 115 MMHG | TEMPERATURE: 97 F | HEIGHT: 72 IN | DIASTOLIC BLOOD PRESSURE: 57 MMHG

## 2020-07-16 DIAGNOSIS — Z85.038 HISTORY OF COLON CANCER, STAGE I: Primary | ICD-10-CM

## 2020-07-16 DIAGNOSIS — Z85.038 HISTORY OF COLON CANCER, STAGE I: ICD-10-CM

## 2020-07-16 PROCEDURE — 99999 PR PBB SHADOW E&M-EST. PATIENT-LVL IV: CPT | Mod: PBBFAC,,, | Performed by: SURGERY

## 2020-07-16 PROCEDURE — 36415 COLL VENOUS BLD VENIPUNCTURE: CPT | Mod: PO

## 2020-07-16 PROCEDURE — 82378 CARCINOEMBRYONIC ANTIGEN: CPT

## 2020-07-16 PROCEDURE — 99213 PR OFFICE/OUTPT VISIT, EST, LEVL III, 20-29 MIN: ICD-10-PCS | Mod: S$PBB,,, | Performed by: SURGERY

## 2020-07-16 PROCEDURE — 99214 OFFICE O/P EST MOD 30 MIN: CPT | Mod: PBBFAC,PO | Performed by: SURGERY

## 2020-07-16 PROCEDURE — 99999 PR PBB SHADOW E&M-EST. PATIENT-LVL IV: ICD-10-PCS | Mod: PBBFAC,,, | Performed by: SURGERY

## 2020-07-16 PROCEDURE — 99213 OFFICE O/P EST LOW 20 MIN: CPT | Mod: S$PBB,,, | Performed by: SURGERY

## 2020-07-16 RX ORDER — COLCHICINE 0.6 MG/1
1 TABLET ORAL DAILY
COMMUNITY
Start: 2020-04-01 | End: 2021-01-28

## 2020-07-16 RX ORDER — CHOLECALCIFEROL (VITAMIN D3) 50 MCG
1 CAPSULE ORAL DAILY
COMMUNITY
End: 2021-06-11

## 2020-07-16 NOTE — PROGRESS NOTES
Pleasant 86 yo M who is s/p proctectomy in 5/18 for a mid rectal cancer that was T2NO.  Pt had colostomy given functional status.  He is feeling well.  Denies pain. No n/v.  No fever/chills. Pt notes he is doing very well  He is scheduled for cscope in 9/20.    Feels well    AFVSS  AAOx3  CTA B  Soft/nt/nd      Lab Results   Component Value Date    CEA 4.1 01/23/2020     A/P: history of colon cancer    Doing well.  JOSE   Return to clinic in 6 months

## 2020-07-17 DIAGNOSIS — Z85.038 HISTORY OF COLON CANCER: Primary | ICD-10-CM

## 2020-07-17 LAB — CEA SERPL-MCNC: 5.4 NG/ML (ref 0–5)

## 2020-08-19 PROBLEM — Z00.00 WELL ADULT EXAM: Status: ACTIVE | Noted: 2020-08-19

## 2020-09-09 PROBLEM — Z86.010 PERSONAL HISTORY OF COLONIC POLYPS: Status: ACTIVE | Noted: 2020-09-09

## 2020-10-21 ENCOUNTER — LAB VISIT (OUTPATIENT)
Dept: LAB | Facility: HOSPITAL | Age: 85
End: 2020-10-21
Attending: SURGERY
Payer: MEDICARE

## 2020-10-21 ENCOUNTER — OFFICE VISIT (OUTPATIENT)
Dept: SURGERY | Facility: CLINIC | Age: 85
End: 2020-10-21
Payer: MEDICARE

## 2020-10-21 VITALS
WEIGHT: 176 LBS | BODY MASS INDEX: 23.84 KG/M2 | DIASTOLIC BLOOD PRESSURE: 59 MMHG | SYSTOLIC BLOOD PRESSURE: 122 MMHG | HEIGHT: 72 IN | HEART RATE: 75 BPM | TEMPERATURE: 97 F

## 2020-10-21 DIAGNOSIS — Z85.038 HISTORY OF COLON CANCER: Primary | ICD-10-CM

## 2020-10-21 DIAGNOSIS — Z85.038 HISTORY OF COLON CANCER: ICD-10-CM

## 2020-10-21 LAB — CEA SERPL-MCNC: 4.9 NG/ML (ref 0–5)

## 2020-10-21 PROCEDURE — 82378 CARCINOEMBRYONIC ANTIGEN: CPT

## 2020-10-21 PROCEDURE — 36415 COLL VENOUS BLD VENIPUNCTURE: CPT | Mod: PO

## 2020-10-21 PROCEDURE — 99213 OFFICE O/P EST LOW 20 MIN: CPT | Mod: S$PBB,,, | Performed by: SURGERY

## 2020-10-21 PROCEDURE — 99213 PR OFFICE/OUTPT VISIT, EST, LEVL III, 20-29 MIN: ICD-10-PCS | Mod: S$PBB,,, | Performed by: SURGERY

## 2020-10-21 PROCEDURE — 99214 OFFICE O/P EST MOD 30 MIN: CPT | Mod: PBBFAC,PO | Performed by: SURGERY

## 2020-10-21 PROCEDURE — 99999 PR PBB SHADOW E&M-EST. PATIENT-LVL IV: ICD-10-PCS | Mod: PBBFAC,,, | Performed by: SURGERY

## 2020-10-21 PROCEDURE — 99999 PR PBB SHADOW E&M-EST. PATIENT-LVL IV: CPT | Mod: PBBFAC,,, | Performed by: SURGERY

## 2020-10-21 RX ORDER — VIT B6/L. MEFOLATE/ME-B12/ALA 25-3.5-1MG
1 CAPSULE ORAL DAILY
COMMUNITY
Start: 2020-08-19 | End: 2021-01-28 | Stop reason: SDUPTHER

## 2020-10-21 NOTE — PROGRESS NOTES
Pleasant 89 yo M who is s/p proctectomy in 5/18 for a mid rectal cancer that was T2NO.  Pt had colostomy given functional status.  He is feeling well.  Denies pain. No n/v.  No fever/chills. Pt notes he is doing very well  He had a colonoscopy performed since his last visit with no significant findings    Afebrile vital signs stable  Soft nontender nondistended      Lab Results   Component Value Date    CEA 5.4 (H) 07/16/2020       Assessment:  History of stage II colon cancer    Doing well no evidence of disease.  We will repeat the CEA today.  Patient will follow-up in 1 year.

## 2020-11-23 PROBLEM — Z00.00 WELL ADULT EXAM: Status: RESOLVED | Noted: 2020-08-19 | Resolved: 2020-11-23

## 2020-12-21 ENCOUNTER — PATIENT MESSAGE (OUTPATIENT)
Dept: OTHER | Facility: OTHER | Age: 85
End: 2020-12-21

## 2021-01-01 ENCOUNTER — TELEPHONE (OUTPATIENT)
Dept: UROLOGY | Facility: CLINIC | Age: 86
End: 2021-01-01

## 2021-01-01 ENCOUNTER — PROCEDURE VISIT (OUTPATIENT)
Dept: UROLOGY | Facility: CLINIC | Age: 86
End: 2021-01-01
Payer: MEDICARE

## 2021-01-01 ENCOUNTER — TELEPHONE (OUTPATIENT)
Dept: UROLOGY | Facility: CLINIC | Age: 86
End: 2021-01-01
Payer: MEDICARE

## 2021-01-01 ENCOUNTER — PATIENT MESSAGE (OUTPATIENT)
Dept: UROLOGY | Facility: CLINIC | Age: 86
End: 2021-01-01
Payer: MEDICARE

## 2021-01-01 VITALS — BODY MASS INDEX: 23.83 KG/M2 | WEIGHT: 175.94 LBS | HEIGHT: 72 IN

## 2021-01-01 DIAGNOSIS — R30.0 DYSURIA: Primary | ICD-10-CM

## 2021-01-01 DIAGNOSIS — Z85.51 HX OF BLADDER CANCER: ICD-10-CM

## 2021-01-01 PROCEDURE — 52000 CYSTOURETHROSCOPY: CPT | Mod: PBBFAC,PO | Performed by: UROLOGY

## 2021-01-01 PROCEDURE — 52000 CYSTOSCOPY: ICD-10-PCS | Mod: S$PBB,,, | Performed by: UROLOGY

## 2021-01-06 ENCOUNTER — LAB VISIT (OUTPATIENT)
Dept: LAB | Facility: HOSPITAL | Age: 86
End: 2021-01-06
Attending: FAMILY MEDICINE
Payer: MEDICARE

## 2021-01-06 ENCOUNTER — TELEPHONE (OUTPATIENT)
Dept: UROLOGY | Facility: CLINIC | Age: 86
End: 2021-01-06

## 2021-01-06 DIAGNOSIS — R31.9 HEMATURIA, UNSPECIFIED TYPE: ICD-10-CM

## 2021-01-06 DIAGNOSIS — R31.9 HEMATURIA, UNSPECIFIED TYPE: Primary | ICD-10-CM

## 2021-01-06 LAB
BACTERIA #/AREA URNS HPF: ABNORMAL /HPF
BILIRUB UR QL STRIP: ABNORMAL
CLARITY UR: ABNORMAL
COLOR UR: ABNORMAL
GLUCOSE UR QL STRIP: NEGATIVE
HGB UR QL STRIP: ABNORMAL
HYALINE CASTS #/AREA URNS LPF: 0 /LPF
KETONES UR QL STRIP: NEGATIVE
LEUKOCYTE ESTERASE UR QL STRIP: NEGATIVE
MICROSCOPIC COMMENT: ABNORMAL
NITRITE UR QL STRIP: NEGATIVE
PH UR STRIP: 6 [PH] (ref 5–8)
PROT UR QL STRIP: ABNORMAL
RBC #/AREA URNS HPF: >100 /HPF (ref 0–4)
SP GR UR STRIP: 1.02 (ref 1–1.03)
URN SPEC COLLECT METH UR: ABNORMAL
WBC #/AREA URNS HPF: 4 /HPF (ref 0–5)

## 2021-01-06 PROCEDURE — 81000 URINALYSIS NONAUTO W/SCOPE: CPT | Mod: PO

## 2021-01-07 ENCOUNTER — TELEPHONE (OUTPATIENT)
Dept: UROLOGY | Facility: CLINIC | Age: 86
End: 2021-01-07

## 2021-01-14 ENCOUNTER — IMMUNIZATION (OUTPATIENT)
Dept: FAMILY MEDICINE | Facility: CLINIC | Age: 86
End: 2021-01-14
Payer: MEDICARE

## 2021-01-14 DIAGNOSIS — Z23 NEED FOR VACCINATION: ICD-10-CM

## 2021-01-14 PROCEDURE — 91300 COVID-19, MRNA, LNP-S, PF, 30 MCG/0.3 ML DOSE VACCINE: CPT | Mod: PBBFAC,PO

## 2021-01-28 PROBLEM — T44.6X5A TAMSULOSIN-ASSOCIATED INTRAOPERATIVE FLOPPY IRIS SYNDROME (IFIS): Chronic | Status: RESOLVED | Noted: 2018-09-05 | Resolved: 2021-01-28

## 2021-01-28 PROBLEM — H21.81 TAMSULOSIN-ASSOCIATED INTRAOPERATIVE FLOPPY IRIS SYNDROME (IFIS): Chronic | Status: RESOLVED | Noted: 2018-09-05 | Resolved: 2021-01-28

## 2021-01-28 PROBLEM — Z99.3 WHEELCHAIR BOUND: Status: ACTIVE | Noted: 2021-01-28

## 2021-01-28 PROBLEM — Z87.2 HISTORY OF DECUBITUS ULCER: Status: ACTIVE | Noted: 2021-01-28

## 2021-02-04 ENCOUNTER — IMMUNIZATION (OUTPATIENT)
Dept: FAMILY MEDICINE | Facility: CLINIC | Age: 86
End: 2021-02-04
Payer: MEDICARE

## 2021-02-04 DIAGNOSIS — Z23 NEED FOR VACCINATION: Primary | ICD-10-CM

## 2021-02-04 PROCEDURE — 91300 COVID-19, MRNA, LNP-S, PF, 30 MCG/0.3 ML DOSE VACCINE: CPT | Mod: PBBFAC,PO

## 2021-02-04 PROCEDURE — 0002A COVID-19, MRNA, LNP-S, PF, 30 MCG/0.3 ML DOSE VACCINE: CPT | Mod: PBBFAC,PO

## 2021-02-08 ENCOUNTER — TELEPHONE (OUTPATIENT)
Dept: VASCULAR SURGERY | Facility: CLINIC | Age: 86
End: 2021-02-08

## 2021-02-18 ENCOUNTER — LAB VISIT (OUTPATIENT)
Dept: LAB | Facility: HOSPITAL | Age: 86
End: 2021-02-18
Attending: FAMILY MEDICINE
Payer: MEDICARE

## 2021-02-18 DIAGNOSIS — R41.3 MEMORY LOSS: Primary | ICD-10-CM

## 2021-02-18 DIAGNOSIS — I10 ESSENTIAL HYPERTENSION, MALIGNANT: ICD-10-CM

## 2021-02-18 DIAGNOSIS — G25.81 RESTLESS LEGS: ICD-10-CM

## 2021-02-18 DIAGNOSIS — N40.0 BENIGN ENLARGEMENT OF PROSTATE: ICD-10-CM

## 2021-02-18 DIAGNOSIS — R41.0 SUBACUTE DELIRIUM: ICD-10-CM

## 2021-02-18 DIAGNOSIS — D50.0 IRON DEFICIENCY ANEMIA DUE TO CHRONIC BLOOD LOSS: ICD-10-CM

## 2021-02-18 DIAGNOSIS — Z79.899 ENCOUNTER FOR LONG-TERM (CURRENT) USE OF MEDICATIONS: ICD-10-CM

## 2021-02-18 LAB
ALBUMIN SERPL BCP-MCNC: 3.8 G/DL (ref 3.5–5.2)
ALP SERPL-CCNC: 65 U/L (ref 55–135)
ALT SERPL W/O P-5'-P-CCNC: 18 U/L (ref 10–44)
ANION GAP SERPL CALC-SCNC: 9 MMOL/L (ref 8–16)
AST SERPL-CCNC: 19 U/L (ref 10–40)
BASOPHILS # BLD AUTO: 0.04 K/UL (ref 0–0.2)
BASOPHILS NFR BLD: 0.5 % (ref 0–1.9)
BILIRUB SERPL-MCNC: 0.5 MG/DL (ref 0.1–1)
BUN SERPL-MCNC: 18 MG/DL (ref 8–23)
CALCIUM SERPL-MCNC: 9.2 MG/DL (ref 8.7–10.5)
CHLORIDE SERPL-SCNC: 103 MMOL/L (ref 95–110)
CO2 SERPL-SCNC: 26 MMOL/L (ref 23–29)
CREAT SERPL-MCNC: 1 MG/DL (ref 0.5–1.4)
DIFFERENTIAL METHOD: ABNORMAL
EOSINOPHIL # BLD AUTO: 0.2 K/UL (ref 0–0.5)
EOSINOPHIL NFR BLD: 2.7 % (ref 0–8)
ERYTHROCYTE [DISTWIDTH] IN BLOOD BY AUTOMATED COUNT: 13.9 % (ref 11.5–14.5)
EST. GFR  (AFRICAN AMERICAN): >60 ML/MIN/1.73 M^2
EST. GFR  (NON AFRICAN AMERICAN): >60 ML/MIN/1.73 M^2
ESTIMATED AVG GLUCOSE: 137 MG/DL (ref 68–131)
GLUCOSE SERPL-MCNC: 162 MG/DL (ref 70–110)
HBA1C MFR BLD: 6.4 % (ref 4–5.6)
HCT VFR BLD AUTO: 47 % (ref 40–54)
HGB BLD-MCNC: 15.5 G/DL (ref 14–18)
IMM GRANULOCYTES # BLD AUTO: 0.02 K/UL (ref 0–0.04)
IMM GRANULOCYTES NFR BLD AUTO: 0.2 % (ref 0–0.5)
LYMPHOCYTES # BLD AUTO: 1.7 K/UL (ref 1–4.8)
LYMPHOCYTES NFR BLD: 20.2 % (ref 18–48)
MCH RBC QN AUTO: 32.9 PG (ref 27–31)
MCHC RBC AUTO-ENTMCNC: 33 G/DL (ref 32–36)
MCV RBC AUTO: 100 FL (ref 82–98)
MONOCYTES # BLD AUTO: 1 K/UL (ref 0.3–1)
MONOCYTES NFR BLD: 12 % (ref 4–15)
NEUTROPHILS # BLD AUTO: 5.3 K/UL (ref 1.8–7.7)
NEUTROPHILS NFR BLD: 64.4 % (ref 38–73)
NRBC BLD-RTO: 0 /100 WBC
PLATELET # BLD AUTO: 173 K/UL (ref 150–350)
PMV BLD AUTO: 10.4 FL (ref 9.2–12.9)
POTASSIUM SERPL-SCNC: 4.6 MMOL/L (ref 3.5–5.1)
PROT SERPL-MCNC: 6.6 G/DL (ref 6–8.4)
RBC # BLD AUTO: 4.71 M/UL (ref 4.6–6.2)
SODIUM SERPL-SCNC: 138 MMOL/L (ref 136–145)
WBC # BLD AUTO: 8.27 K/UL (ref 3.9–12.7)

## 2021-02-18 PROCEDURE — 83036 HEMOGLOBIN GLYCOSYLATED A1C: CPT

## 2021-02-18 PROCEDURE — 84443 ASSAY THYROID STIM HORMONE: CPT

## 2021-02-18 PROCEDURE — 36415 COLL VENOUS BLD VENIPUNCTURE: CPT | Mod: PO

## 2021-02-18 PROCEDURE — 86592 SYPHILIS TEST NON-TREP QUAL: CPT

## 2021-02-18 PROCEDURE — 80053 COMPREHEN METABOLIC PANEL: CPT | Mod: PO

## 2021-02-18 PROCEDURE — 82140 ASSAY OF AMMONIA: CPT

## 2021-02-18 PROCEDURE — 82607 VITAMIN B-12: CPT

## 2021-02-18 PROCEDURE — 82306 VITAMIN D 25 HYDROXY: CPT

## 2021-02-18 PROCEDURE — 85025 COMPLETE CBC W/AUTO DIFF WBC: CPT

## 2021-02-18 PROCEDURE — 84439 ASSAY OF FREE THYROXINE: CPT

## 2021-02-18 PROCEDURE — 80061 LIPID PANEL: CPT

## 2021-02-18 PROCEDURE — 82652 VIT D 1 25-DIHYDROXY: CPT

## 2021-02-19 LAB
25(OH)D3+25(OH)D2 SERPL-MCNC: 36 NG/ML (ref 30–96)
AMMONIA PLAS-SCNC: 31 UMOL/L (ref 10–50)
CHOLEST SERPL-MCNC: 179 MG/DL (ref 120–199)
CHOLEST/HDLC SERPL: 4.5 {RATIO} (ref 2–5)
HDLC SERPL-MCNC: 40 MG/DL (ref 40–75)
HDLC SERPL: 22.3 % (ref 20–50)
LDLC SERPL CALC-MCNC: 109.6 MG/DL (ref 63–159)
NONHDLC SERPL-MCNC: 139 MG/DL
RPR SER QL: NORMAL
T4 FREE SERPL-MCNC: 0.97 NG/DL (ref 0.71–1.51)
TRIGL SERPL-MCNC: 147 MG/DL (ref 30–150)
TSH SERPL DL<=0.005 MIU/L-ACNC: 1.15 UIU/ML (ref 0.4–4)
VIT B12 SERPL-MCNC: 1450 PG/ML (ref 210–950)

## 2021-02-22 LAB — 1,25(OH)2D3 SERPL-MCNC: 44 PG/ML (ref 20–79)

## 2021-02-23 ENCOUNTER — HOSPITAL ENCOUNTER (OUTPATIENT)
Dept: RADIOLOGY | Facility: HOSPITAL | Age: 86
Discharge: HOME | End: 2021-02-23
Attending: NURSE PRACTITIONER
Payer: MEDICARE

## 2021-02-23 DIAGNOSIS — R41.0 CONFUSION: ICD-10-CM

## 2021-02-23 DIAGNOSIS — R41.3 IMPAIRED MEMORY: ICD-10-CM

## 2021-02-23 PROCEDURE — 70553 MRI BRAIN STEM W/O & W/DYE: CPT | Mod: TC,PO

## 2021-02-23 PROCEDURE — A9585 GADOBUTROL INJECTION: HCPCS | Mod: PO | Performed by: NURSE PRACTITIONER

## 2021-02-23 PROCEDURE — 70553 MRI BRAIN STEM W/O & W/DYE: CPT | Mod: 26,,, | Performed by: RADIOLOGY

## 2021-02-23 PROCEDURE — 70553 MRI BRAIN W WO CONTRAST: ICD-10-PCS | Mod: 26,,, | Performed by: RADIOLOGY

## 2021-02-23 PROCEDURE — 25500020 PHARM REV CODE 255: Mod: PO | Performed by: NURSE PRACTITIONER

## 2021-02-23 RX ORDER — GADOBUTROL 604.72 MG/ML
8 INJECTION INTRAVENOUS
Status: COMPLETED | OUTPATIENT
Start: 2021-02-23 | End: 2021-02-23

## 2021-02-23 RX ADMIN — GADOBUTROL 8 ML: 604.72 INJECTION INTRAVENOUS at 08:02

## 2021-02-25 ENCOUNTER — OFFICE VISIT (OUTPATIENT)
Dept: VASCULAR SURGERY | Facility: CLINIC | Age: 86
End: 2021-02-25
Payer: MEDICARE

## 2021-02-25 VITALS
SYSTOLIC BLOOD PRESSURE: 106 MMHG | DIASTOLIC BLOOD PRESSURE: 58 MMHG | HEART RATE: 88 BPM | BODY MASS INDEX: 23.87 KG/M2 | HEIGHT: 72 IN

## 2021-02-25 DIAGNOSIS — I73.9 PAD (PERIPHERAL ARTERY DISEASE): Primary | ICD-10-CM

## 2021-02-25 PROCEDURE — 99204 OFFICE O/P NEW MOD 45 MIN: CPT | Mod: S$PBB,,, | Performed by: THORACIC SURGERY (CARDIOTHORACIC VASCULAR SURGERY)

## 2021-02-25 PROCEDURE — 99213 OFFICE O/P EST LOW 20 MIN: CPT | Mod: PBBFAC,PO | Performed by: THORACIC SURGERY (CARDIOTHORACIC VASCULAR SURGERY)

## 2021-02-25 PROCEDURE — 99999 PR PBB SHADOW E&M-EST. PATIENT-LVL III: CPT | Mod: PBBFAC,,, | Performed by: THORACIC SURGERY (CARDIOTHORACIC VASCULAR SURGERY)

## 2021-02-25 PROCEDURE — 99204 PR OFFICE/OUTPT VISIT, NEW, LEVL IV, 45-59 MIN: ICD-10-PCS | Mod: S$PBB,,, | Performed by: THORACIC SURGERY (CARDIOTHORACIC VASCULAR SURGERY)

## 2021-02-25 PROCEDURE — 99999 PR PBB SHADOW E&M-EST. PATIENT-LVL III: ICD-10-PCS | Mod: PBBFAC,,, | Performed by: THORACIC SURGERY (CARDIOTHORACIC VASCULAR SURGERY)

## 2021-03-10 ENCOUNTER — HOSPITAL ENCOUNTER (OUTPATIENT)
Dept: RADIOLOGY | Facility: HOSPITAL | Age: 86
Discharge: HOME | End: 2021-03-10
Attending: THORACIC SURGERY (CARDIOTHORACIC VASCULAR SURGERY)
Payer: MEDICARE

## 2021-03-10 DIAGNOSIS — I73.9 PAD (PERIPHERAL ARTERY DISEASE): ICD-10-CM

## 2021-03-10 PROCEDURE — 93925 LOWER EXTREMITY STUDY: CPT | Mod: TC,PO

## 2021-03-10 PROCEDURE — 93925 US LOWER EXTREMITY ARTERIES BILATERAL: ICD-10-PCS | Mod: 26,,, | Performed by: RADIOLOGY

## 2021-03-10 PROCEDURE — 93925 LOWER EXTREMITY STUDY: CPT | Mod: 26,,, | Performed by: RADIOLOGY

## 2021-03-15 ENCOUNTER — PATIENT MESSAGE (OUTPATIENT)
Dept: VASCULAR SURGERY | Facility: CLINIC | Age: 86
End: 2021-03-15

## 2021-03-16 ENCOUNTER — PATIENT MESSAGE (OUTPATIENT)
Dept: VASCULAR SURGERY | Facility: CLINIC | Age: 86
End: 2021-03-16

## 2021-03-23 DIAGNOSIS — M25.561 ACUTE PAIN OF RIGHT KNEE: Primary | ICD-10-CM

## 2021-03-24 ENCOUNTER — HOSPITAL ENCOUNTER (OUTPATIENT)
Dept: RADIOLOGY | Facility: HOSPITAL | Age: 86
Discharge: HOME | End: 2021-03-24
Attending: ORTHOPAEDIC SURGERY
Payer: MEDICARE

## 2021-03-24 ENCOUNTER — OFFICE VISIT (OUTPATIENT)
Dept: ORTHOPEDICS | Facility: CLINIC | Age: 86
End: 2021-03-24
Payer: MEDICARE

## 2021-03-24 VITALS — WEIGHT: 176 LBS | HEIGHT: 72 IN | BODY MASS INDEX: 23.84 KG/M2

## 2021-03-24 DIAGNOSIS — M25.561 ACUTE PAIN OF RIGHT KNEE: Primary | ICD-10-CM

## 2021-03-24 DIAGNOSIS — I73.9 PAD (PERIPHERAL ARTERY DISEASE): ICD-10-CM

## 2021-03-24 DIAGNOSIS — M25.561 ACUTE PAIN OF RIGHT KNEE: ICD-10-CM

## 2021-03-24 DIAGNOSIS — M17.11 OSTEOARTHRITIS OF RIGHT KNEE, UNSPECIFIED OSTEOARTHRITIS TYPE: ICD-10-CM

## 2021-03-24 PROCEDURE — 99999 PR PBB SHADOW E&M-EST. PATIENT-LVL III: CPT | Mod: PBBFAC,,, | Performed by: ORTHOPAEDIC SURGERY

## 2021-03-24 PROCEDURE — 99204 PR OFFICE/OUTPT VISIT, NEW, LEVL IV, 45-59 MIN: ICD-10-PCS | Mod: 25,S$PBB,, | Performed by: ORTHOPAEDIC SURGERY

## 2021-03-24 PROCEDURE — 99213 OFFICE O/P EST LOW 20 MIN: CPT | Mod: PBBFAC,25,PN | Performed by: ORTHOPAEDIC SURGERY

## 2021-03-24 PROCEDURE — 99204 OFFICE O/P NEW MOD 45 MIN: CPT | Mod: 25,S$PBB,, | Performed by: ORTHOPAEDIC SURGERY

## 2021-03-24 PROCEDURE — 20610 DRAIN/INJ JOINT/BURSA W/O US: CPT | Mod: PBBFAC,PN | Performed by: ORTHOPAEDIC SURGERY

## 2021-03-24 PROCEDURE — 73560 X-RAY EXAM OF KNEE 1 OR 2: CPT | Mod: TC,PO,LT

## 2021-03-24 PROCEDURE — 73560 XR KNEE ORTHO RIGHT: ICD-10-PCS | Mod: 26,LT,, | Performed by: RADIOLOGY

## 2021-03-24 PROCEDURE — 73562 X-RAY EXAM OF KNEE 3: CPT | Mod: 26,RT,, | Performed by: RADIOLOGY

## 2021-03-24 PROCEDURE — 99999 PR PBB SHADOW E&M-EST. PATIENT-LVL III: ICD-10-PCS | Mod: PBBFAC,,, | Performed by: ORTHOPAEDIC SURGERY

## 2021-03-24 PROCEDURE — 20610 LARGE JOINT ASPIRATION/INJECTION: R KNEE: ICD-10-PCS | Mod: S$PBB,RT,, | Performed by: ORTHOPAEDIC SURGERY

## 2021-03-24 PROCEDURE — 73560 X-RAY EXAM OF KNEE 1 OR 2: CPT | Mod: 26,LT,, | Performed by: RADIOLOGY

## 2021-03-24 PROCEDURE — 73562 XR KNEE ORTHO RIGHT: ICD-10-PCS | Mod: 26,RT,, | Performed by: RADIOLOGY

## 2021-03-24 RX ORDER — TRIAMCINOLONE ACETONIDE 40 MG/ML
40 INJECTION, SUSPENSION INTRA-ARTICULAR; INTRAMUSCULAR
Status: DISCONTINUED | OUTPATIENT
Start: 2021-03-24 | End: 2021-03-24 | Stop reason: HOSPADM

## 2021-03-24 RX ADMIN — TRIAMCINOLONE ACETONIDE 40 MG: 40 INJECTION, SUSPENSION INTRA-ARTICULAR; INTRAMUSCULAR at 11:03

## 2021-03-30 ENCOUNTER — TELEPHONE (OUTPATIENT)
Dept: UROLOGY | Facility: CLINIC | Age: 86
End: 2021-03-30

## 2021-03-30 ENCOUNTER — LAB VISIT (OUTPATIENT)
Dept: LAB | Facility: HOSPITAL | Age: 86
End: 2021-03-30
Attending: UROLOGY
Payer: MEDICARE

## 2021-03-30 DIAGNOSIS — R31.9 HEMATURIA, UNSPECIFIED TYPE: Primary | ICD-10-CM

## 2021-03-30 DIAGNOSIS — R31.9 HEMATURIA, UNSPECIFIED TYPE: ICD-10-CM

## 2021-03-30 LAB
BACTERIA #/AREA URNS HPF: ABNORMAL /HPF
BILIRUB UR QL STRIP: NEGATIVE
CLARITY UR: ABNORMAL
COLOR UR: ABNORMAL
GLUCOSE UR QL STRIP: NEGATIVE
HGB UR QL STRIP: ABNORMAL
KETONES UR QL STRIP: NEGATIVE
LEUKOCYTE ESTERASE UR QL STRIP: NEGATIVE
MICROSCOPIC COMMENT: ABNORMAL
NITRITE UR QL STRIP: NEGATIVE
PH UR STRIP: 6 [PH] (ref 5–8)
PROT UR QL STRIP: ABNORMAL
RBC #/AREA URNS HPF: >100 /HPF (ref 0–4)
SP GR UR STRIP: 1.02 (ref 1–1.03)
URN SPEC COLLECT METH UR: ABNORMAL

## 2021-03-30 PROCEDURE — 81000 URINALYSIS NONAUTO W/SCOPE: CPT | Mod: PO | Performed by: UROLOGY

## 2021-04-07 ENCOUNTER — TELEPHONE (OUTPATIENT)
Dept: UROLOGY | Facility: CLINIC | Age: 86
End: 2021-04-07

## 2021-04-13 ENCOUNTER — TELEPHONE (OUTPATIENT)
Dept: UROLOGY | Facility: CLINIC | Age: 86
End: 2021-04-13

## 2021-05-21 ENCOUNTER — TELEPHONE (OUTPATIENT)
Dept: UROLOGY | Facility: CLINIC | Age: 86
End: 2021-05-21

## 2021-05-21 ENCOUNTER — PATIENT MESSAGE (OUTPATIENT)
Dept: UROLOGY | Facility: CLINIC | Age: 86
End: 2021-05-21

## 2021-05-21 ENCOUNTER — LAB VISIT (OUTPATIENT)
Dept: LAB | Facility: HOSPITAL | Age: 86
End: 2021-05-21
Payer: MEDICARE

## 2021-05-21 DIAGNOSIS — R31.9 HEMATURIA, UNSPECIFIED TYPE: ICD-10-CM

## 2021-05-21 DIAGNOSIS — R31.9 HEMATURIA, UNSPECIFIED TYPE: Primary | ICD-10-CM

## 2021-05-21 LAB
BACTERIA #/AREA URNS HPF: ABNORMAL /HPF
BILIRUB UR QL STRIP: ABNORMAL
CLARITY UR: CLEAR
COLOR UR: YELLOW
GLUCOSE UR QL STRIP: ABNORMAL
HGB UR QL STRIP: ABNORMAL
HYALINE CASTS #/AREA URNS LPF: 0 /LPF
KETONES UR QL STRIP: NEGATIVE
LEUKOCYTE ESTERASE UR QL STRIP: NEGATIVE
MICROSCOPIC COMMENT: ABNORMAL
NITRITE UR QL STRIP: NEGATIVE
PH UR STRIP: 6 [PH] (ref 5–8)
PROT UR QL STRIP: ABNORMAL
RBC #/AREA URNS HPF: >100 /HPF (ref 0–4)
SP GR UR STRIP: 1.02 (ref 1–1.03)
URN SPEC COLLECT METH UR: ABNORMAL
WBC #/AREA URNS HPF: 1 /HPF (ref 0–5)

## 2021-05-21 PROCEDURE — 81000 URINALYSIS NONAUTO W/SCOPE: CPT | Mod: PO | Performed by: UROLOGY

## 2021-06-01 ENCOUNTER — TELEPHONE (OUTPATIENT)
Dept: SURGERY | Facility: CLINIC | Age: 86
End: 2021-06-01

## 2021-06-02 ENCOUNTER — TELEPHONE (OUTPATIENT)
Dept: SURGERY | Facility: CLINIC | Age: 86
End: 2021-06-02

## 2021-06-03 ENCOUNTER — PROCEDURE VISIT (OUTPATIENT)
Dept: UROLOGY | Facility: CLINIC | Age: 86
End: 2021-06-03
Payer: MEDICARE

## 2021-06-03 VITALS
BODY MASS INDEX: 23.84 KG/M2 | WEIGHT: 176 LBS | SYSTOLIC BLOOD PRESSURE: 100 MMHG | DIASTOLIC BLOOD PRESSURE: 65 MMHG | HEIGHT: 72 IN | HEART RATE: 82 BPM

## 2021-06-03 DIAGNOSIS — Z85.51 HX OF BLADDER CANCER: ICD-10-CM

## 2021-06-03 PROCEDURE — 52234 CYSTOSCOPY AND TREATMENT: CPT | Mod: PBBFAC,PN | Performed by: UROLOGY

## 2021-06-03 PROCEDURE — 52234 PR CYSTOURETHROSCOPY,FULGUR 0.5-2 CM LESN: ICD-10-PCS | Mod: S$PBB,,, | Performed by: UROLOGY

## 2021-06-03 PROCEDURE — 52214 CYSTOSCOPY AND TREATMENT: CPT | Mod: PBBFAC,PO | Performed by: UROLOGY

## 2021-06-03 PROCEDURE — 52000 CYSTOURETHROSCOPY: CPT | Mod: PBBFAC,PO | Performed by: UROLOGY

## 2021-06-03 PROCEDURE — 52234 CYSTOSCOPY AND TREATMENT: CPT | Mod: S$PBB,,, | Performed by: UROLOGY

## 2021-06-03 RX ORDER — COLCHICINE 0.6 MG/1
TABLET ORAL DAILY PRN
COMMUNITY
End: 2021-06-11

## 2021-06-03 RX ORDER — GABAPENTIN 100 MG/1
100 CAPSULE ORAL 2 TIMES DAILY
COMMUNITY
Start: 2021-03-12 | End: 2021-06-11

## 2021-06-03 RX ORDER — FINASTERIDE 5 MG/1
5 TABLET, FILM COATED ORAL DAILY
Qty: 30 TABLET | Refills: 12 | Status: SHIPPED | OUTPATIENT
Start: 2021-06-03 | End: 2022-01-01 | Stop reason: SDUPTHER

## 2021-06-07 ENCOUNTER — PATIENT MESSAGE (OUTPATIENT)
Dept: UROLOGY | Facility: CLINIC | Age: 86
End: 2021-06-07

## 2021-06-08 ENCOUNTER — PATIENT MESSAGE (OUTPATIENT)
Dept: UROLOGY | Facility: CLINIC | Age: 86
End: 2021-06-08

## 2021-06-09 ENCOUNTER — CLINICAL SUPPORT (OUTPATIENT)
Dept: UROLOGY | Facility: CLINIC | Age: 86
End: 2021-06-09
Payer: MEDICARE

## 2021-06-09 DIAGNOSIS — R31.9 HEMATURIA, UNSPECIFIED TYPE: Primary | ICD-10-CM

## 2021-06-10 ENCOUNTER — TELEPHONE (OUTPATIENT)
Dept: UROLOGY | Facility: CLINIC | Age: 86
End: 2021-06-10

## 2021-06-10 ENCOUNTER — PATIENT MESSAGE (OUTPATIENT)
Dept: UROLOGY | Facility: CLINIC | Age: 86
End: 2021-06-10

## 2021-06-14 ENCOUNTER — PATIENT MESSAGE (OUTPATIENT)
Dept: UROLOGY | Facility: CLINIC | Age: 86
End: 2021-06-14

## 2021-06-15 ENCOUNTER — PATIENT MESSAGE (OUTPATIENT)
Dept: UROLOGY | Facility: CLINIC | Age: 86
End: 2021-06-15

## 2021-06-16 ENCOUNTER — PATIENT MESSAGE (OUTPATIENT)
Dept: UROLOGY | Facility: CLINIC | Age: 86
End: 2021-06-16

## 2021-06-17 ENCOUNTER — PATIENT MESSAGE (OUTPATIENT)
Dept: SURGERY | Facility: CLINIC | Age: 86
End: 2021-06-17

## 2021-06-17 ENCOUNTER — PATIENT MESSAGE (OUTPATIENT)
Dept: UROLOGY | Facility: CLINIC | Age: 86
End: 2021-06-17

## 2021-06-17 PROBLEM — G25.81 RESTLESS LEG SYNDROME: Status: ACTIVE | Noted: 2021-06-17

## 2021-06-17 PROBLEM — M15.9 GENERALIZED OSTEOARTHRITIS: Status: ACTIVE | Noted: 2021-06-17

## 2021-06-17 PROBLEM — Z85.51 HISTORY OF BLADDER CANCER: Status: ACTIVE | Noted: 2021-06-17

## 2021-06-18 ENCOUNTER — PATIENT MESSAGE (OUTPATIENT)
Dept: UROLOGY | Facility: CLINIC | Age: 86
End: 2021-06-18

## 2021-06-21 ENCOUNTER — PATIENT MESSAGE (OUTPATIENT)
Dept: UROLOGY | Facility: CLINIC | Age: 86
End: 2021-06-21

## 2021-06-21 ENCOUNTER — PATIENT MESSAGE (OUTPATIENT)
Dept: SURGERY | Facility: CLINIC | Age: 86
End: 2021-06-21

## 2021-06-21 DIAGNOSIS — K91.89 LEAKAGE OF RECTAL STUMP: Primary | ICD-10-CM

## 2021-06-21 RX ORDER — METRONIDAZOLE 500 MG/100ML
500 INJECTION, SOLUTION INTRAVENOUS
Status: CANCELLED | OUTPATIENT
Start: 2021-06-21

## 2021-06-21 RX ORDER — SODIUM CHLORIDE 9 MG/ML
INJECTION, SOLUTION INTRAVENOUS CONTINUOUS
Status: CANCELLED | OUTPATIENT
Start: 2021-06-21

## 2021-06-28 ENCOUNTER — TELEPHONE (OUTPATIENT)
Dept: UROLOGY | Facility: CLINIC | Age: 86
End: 2021-06-28

## 2021-06-30 ENCOUNTER — PATIENT MESSAGE (OUTPATIENT)
Dept: SURGERY | Facility: CLINIC | Age: 86
End: 2021-06-30

## 2021-07-02 ENCOUNTER — TELEPHONE (OUTPATIENT)
Dept: ENDOSCOPY | Facility: HOSPITAL | Age: 86
End: 2021-07-02

## 2021-07-07 ENCOUNTER — ANESTHESIA EVENT (OUTPATIENT)
Dept: ENDOSCOPY | Facility: HOSPITAL | Age: 86
End: 2021-07-07
Payer: MEDICARE

## 2021-07-07 ENCOUNTER — ANESTHESIA (OUTPATIENT)
Dept: ENDOSCOPY | Facility: HOSPITAL | Age: 86
End: 2021-07-07
Payer: MEDICARE

## 2021-07-07 ENCOUNTER — HOSPITAL ENCOUNTER (OUTPATIENT)
Facility: HOSPITAL | Age: 86
Discharge: HOME | End: 2021-07-07
Attending: SURGERY | Admitting: SURGERY
Payer: MEDICARE

## 2021-07-07 PROCEDURE — 37000008 HC ANESTHESIA 1ST 15 MINUTES: Mod: PO | Performed by: SURGERY

## 2021-07-07 PROCEDURE — D9220A PRA ANESTHESIA: ICD-10-PCS | Mod: CRNA,,, | Performed by: NURSE ANESTHETIST, CERTIFIED REGISTERED

## 2021-07-07 PROCEDURE — 25000003 PHARM REV CODE 250: Mod: PO | Performed by: NURSE ANESTHETIST, CERTIFIED REGISTERED

## 2021-07-07 PROCEDURE — D9220A PRA ANESTHESIA: Mod: CRNA,,, | Performed by: NURSE ANESTHETIST, CERTIFIED REGISTERED

## 2021-07-07 PROCEDURE — D9220A PRA ANESTHESIA: ICD-10-PCS | Mod: ANES,,, | Performed by: ANESTHESIOLOGY

## 2021-07-07 PROCEDURE — 63600175 PHARM REV CODE 636 W HCPCS: Mod: PO | Performed by: NURSE ANESTHETIST, CERTIFIED REGISTERED

## 2021-07-07 PROCEDURE — 45330 DIAGNOSTIC SIGMOIDOSCOPY: CPT | Mod: PO | Performed by: SURGERY

## 2021-07-07 PROCEDURE — 45330 PR SIGMOIDOSCOPY,DIAG2STIC: ICD-10-PCS | Mod: ,,, | Performed by: SURGERY

## 2021-07-07 PROCEDURE — 45330 DIAGNOSTIC SIGMOIDOSCOPY: CPT | Mod: ,,, | Performed by: SURGERY

## 2021-07-07 PROCEDURE — D9220A PRA ANESTHESIA: Mod: ANES,,, | Performed by: ANESTHESIOLOGY

## 2021-07-07 RX ORDER — LIDOCAINE HCL/PF 100 MG/5ML
SYRINGE (ML) INTRAVENOUS
Status: DISCONTINUED | OUTPATIENT
Start: 2021-07-07 | End: 2021-07-07

## 2021-07-07 RX ORDER — PROPOFOL 10 MG/ML
VIAL (ML) INTRAVENOUS
Status: DISCONTINUED | OUTPATIENT
Start: 2021-07-07 | End: 2021-07-07

## 2021-07-07 RX ADMIN — PROPOFOL 30 MG: 10 INJECTION, EMULSION INTRAVENOUS at 11:07

## 2021-07-07 RX ADMIN — SODIUM CHLORIDE, SODIUM LACTATE, POTASSIUM CHLORIDE, AND CALCIUM CHLORIDE: .6; .31; .03; .02 INJECTION, SOLUTION INTRAVENOUS at 10:07

## 2021-07-07 RX ADMIN — LIDOCAINE HYDROCHLORIDE 75 MG: 20 INJECTION, SOLUTION INTRAVENOUS at 11:07

## 2021-07-08 VITALS
DIASTOLIC BLOOD PRESSURE: 61 MMHG | OXYGEN SATURATION: 96 % | HEART RATE: 79 BPM | SYSTOLIC BLOOD PRESSURE: 124 MMHG | TEMPERATURE: 98 F | RESPIRATION RATE: 16 BRPM

## 2021-07-12 ENCOUNTER — TELEPHONE (OUTPATIENT)
Dept: UROLOGY | Facility: CLINIC | Age: 86
End: 2021-07-12

## 2021-07-21 ENCOUNTER — TELEPHONE (OUTPATIENT)
Dept: UROLOGY | Facility: CLINIC | Age: 86
End: 2021-07-21

## 2021-07-22 ENCOUNTER — PATIENT MESSAGE (OUTPATIENT)
Dept: ORTHOPEDICS | Facility: CLINIC | Age: 86
End: 2021-07-22

## 2021-07-23 ENCOUNTER — OFFICE VISIT (OUTPATIENT)
Dept: ORTHOPEDICS | Facility: CLINIC | Age: 86
End: 2021-07-23
Payer: MEDICARE

## 2021-07-23 ENCOUNTER — HOSPITAL ENCOUNTER (OUTPATIENT)
Dept: RADIOLOGY | Facility: HOSPITAL | Age: 86
Discharge: HOME | End: 2021-07-23
Attending: ORTHOPAEDIC SURGERY
Payer: MEDICARE

## 2021-07-23 VITALS — HEIGHT: 72 IN | WEIGHT: 176 LBS | BODY MASS INDEX: 23.84 KG/M2

## 2021-07-23 DIAGNOSIS — M17.11 OSTEOARTHRITIS OF RIGHT KNEE, UNSPECIFIED OSTEOARTHRITIS TYPE: Primary | ICD-10-CM

## 2021-07-23 DIAGNOSIS — M25.561 ACUTE PAIN OF RIGHT KNEE: Primary | ICD-10-CM

## 2021-07-23 DIAGNOSIS — M17.11 OSTEOARTHRITIS OF RIGHT KNEE, UNSPECIFIED OSTEOARTHRITIS TYPE: ICD-10-CM

## 2021-07-23 PROCEDURE — 73562 XR KNEE ORTHO RIGHT: ICD-10-PCS | Mod: 26,RT,, | Performed by: RADIOLOGY

## 2021-07-23 PROCEDURE — 73560 X-RAY EXAM OF KNEE 1 OR 2: CPT | Mod: TC,PO,LT

## 2021-07-23 PROCEDURE — 73560 X-RAY EXAM OF KNEE 1 OR 2: CPT | Mod: 26,LT,, | Performed by: RADIOLOGY

## 2021-07-23 PROCEDURE — 99999 PR PBB SHADOW E&M-EST. PATIENT-LVL III: ICD-10-PCS | Mod: PBBFAC,,, | Performed by: ORTHOPAEDIC SURGERY

## 2021-07-23 PROCEDURE — 99999 PR PBB SHADOW E&M-EST. PATIENT-LVL III: CPT | Mod: PBBFAC,,, | Performed by: ORTHOPAEDIC SURGERY

## 2021-07-23 PROCEDURE — 73560 XR KNEE ORTHO RIGHT: ICD-10-PCS | Mod: 26,LT,, | Performed by: RADIOLOGY

## 2021-07-23 PROCEDURE — 73562 X-RAY EXAM OF KNEE 3: CPT | Mod: 26,RT,, | Performed by: RADIOLOGY

## 2021-07-23 PROCEDURE — 99213 PR OFFICE/OUTPT VISIT, EST, LEVL III, 20-29 MIN: ICD-10-PCS | Mod: S$PBB,,, | Performed by: ORTHOPAEDIC SURGERY

## 2021-07-23 PROCEDURE — 99213 OFFICE O/P EST LOW 20 MIN: CPT | Mod: S$PBB,,, | Performed by: ORTHOPAEDIC SURGERY

## 2021-07-23 PROCEDURE — 99213 OFFICE O/P EST LOW 20 MIN: CPT | Mod: PBBFAC,PN | Performed by: ORTHOPAEDIC SURGERY

## 2021-09-06 ENCOUNTER — PATIENT MESSAGE (OUTPATIENT)
Dept: UROLOGY | Facility: CLINIC | Age: 86
End: 2021-09-06

## 2021-09-08 ENCOUNTER — TELEPHONE (OUTPATIENT)
Dept: UROLOGY | Facility: CLINIC | Age: 86
End: 2021-09-08

## 2021-09-09 ENCOUNTER — PATIENT MESSAGE (OUTPATIENT)
Dept: UROLOGY | Facility: CLINIC | Age: 86
End: 2021-09-09

## 2021-09-09 RX ORDER — CEPHALEXIN 500 MG/1
500 CAPSULE ORAL EVERY 8 HOURS
Qty: 30 CAPSULE | Refills: 0 | Status: SHIPPED | OUTPATIENT
Start: 2021-09-09 | End: 2021-09-19

## 2021-09-10 ENCOUNTER — TELEPHONE (OUTPATIENT)
Dept: CARDIOLOGY | Facility: CLINIC | Age: 86
End: 2021-09-10

## 2021-09-15 ENCOUNTER — PATIENT MESSAGE (OUTPATIENT)
Dept: SURGERY | Facility: CLINIC | Age: 86
End: 2021-09-15

## 2021-09-16 ENCOUNTER — OFFICE VISIT (OUTPATIENT)
Dept: CARDIOLOGY | Facility: CLINIC | Age: 86
End: 2021-09-16
Payer: MEDICARE

## 2021-09-16 VITALS
WEIGHT: 176 LBS | HEART RATE: 90 BPM | DIASTOLIC BLOOD PRESSURE: 59 MMHG | SYSTOLIC BLOOD PRESSURE: 114 MMHG | BODY MASS INDEX: 23.87 KG/M2

## 2021-09-16 DIAGNOSIS — I10 ESSENTIAL HYPERTENSION: ICD-10-CM

## 2021-09-16 DIAGNOSIS — I73.9 PAD (PERIPHERAL ARTERY DISEASE): Primary | ICD-10-CM

## 2021-09-16 DIAGNOSIS — I48.20 CHRONIC ATRIAL FIBRILLATION: ICD-10-CM

## 2021-09-16 DIAGNOSIS — Z01.810 PREOP CARDIOVASCULAR EXAM: ICD-10-CM

## 2021-09-16 PROCEDURE — 99214 PR OFFICE/OUTPT VISIT, EST, LEVL IV, 30-39 MIN: ICD-10-PCS | Mod: S$PBB,,, | Performed by: INTERNAL MEDICINE

## 2021-09-16 PROCEDURE — 99999 PR PBB SHADOW E&M-EST. PATIENT-LVL III: CPT | Mod: PBBFAC,,, | Performed by: INTERNAL MEDICINE

## 2021-09-16 PROCEDURE — 99999 PR PBB SHADOW E&M-EST. PATIENT-LVL III: ICD-10-PCS | Mod: PBBFAC,,, | Performed by: INTERNAL MEDICINE

## 2021-09-16 PROCEDURE — 99213 OFFICE O/P EST LOW 20 MIN: CPT | Mod: PBBFAC,PO | Performed by: INTERNAL MEDICINE

## 2021-09-16 PROCEDURE — 99214 OFFICE O/P EST MOD 30 MIN: CPT | Mod: S$PBB,,, | Performed by: INTERNAL MEDICINE

## 2021-09-16 RX ORDER — AMOXICILLIN 500 MG
CAPSULE ORAL DAILY
Status: ON HOLD | COMMUNITY
End: 2022-01-01 | Stop reason: CLARIF

## 2021-09-17 ENCOUNTER — TELEPHONE (OUTPATIENT)
Dept: SURGERY | Facility: CLINIC | Age: 86
End: 2021-09-17

## 2021-09-24 PROBLEM — Z00.00 ANNUAL PHYSICAL EXAM: Status: ACTIVE | Noted: 2021-09-16

## 2021-10-05 ENCOUNTER — TELEPHONE (OUTPATIENT)
Dept: VASCULAR SURGERY | Facility: CLINIC | Age: 86
End: 2021-10-05

## 2021-10-07 ENCOUNTER — OFFICE VISIT (OUTPATIENT)
Dept: VASCULAR SURGERY | Facility: CLINIC | Age: 86
End: 2021-10-07
Payer: MEDICARE

## 2021-10-07 VITALS — SYSTOLIC BLOOD PRESSURE: 125 MMHG | DIASTOLIC BLOOD PRESSURE: 58 MMHG

## 2021-10-07 DIAGNOSIS — I73.9 PAD (PERIPHERAL ARTERY DISEASE): Primary | ICD-10-CM

## 2021-10-07 PROCEDURE — 99213 OFFICE O/P EST LOW 20 MIN: CPT | Mod: PBBFAC,PO | Performed by: THORACIC SURGERY (CARDIOTHORACIC VASCULAR SURGERY)

## 2021-10-07 PROCEDURE — 99999 PR PBB SHADOW E&M-EST. PATIENT-LVL III: ICD-10-PCS | Mod: PBBFAC,,, | Performed by: THORACIC SURGERY (CARDIOTHORACIC VASCULAR SURGERY)

## 2021-10-07 PROCEDURE — 99213 PR OFFICE/OUTPT VISIT, EST, LEVL III, 20-29 MIN: ICD-10-PCS | Mod: S$PBB,,, | Performed by: THORACIC SURGERY (CARDIOTHORACIC VASCULAR SURGERY)

## 2021-10-07 PROCEDURE — 99213 OFFICE O/P EST LOW 20 MIN: CPT | Mod: S$PBB,,, | Performed by: THORACIC SURGERY (CARDIOTHORACIC VASCULAR SURGERY)

## 2021-10-07 PROCEDURE — 99999 PR PBB SHADOW E&M-EST. PATIENT-LVL III: CPT | Mod: PBBFAC,,, | Performed by: THORACIC SURGERY (CARDIOTHORACIC VASCULAR SURGERY)

## 2021-10-07 RX ORDER — HYDROCODONE BITARTRATE AND ACETAMINOPHEN 7.5; 325 MG/1; MG/1
TABLET ORAL
COMMUNITY
Start: 2021-09-17 | End: 2021-01-01 | Stop reason: CLARIF

## 2021-10-07 RX ORDER — COLCHICINE 0.6 MG/1
0.6 TABLET ORAL 2 TIMES DAILY
COMMUNITY
Start: 2021-10-05 | End: 2021-01-01 | Stop reason: CLARIF

## 2021-10-08 ENCOUNTER — TELEPHONE (OUTPATIENT)
Dept: UROLOGY | Facility: CLINIC | Age: 86
End: 2021-10-08

## 2021-10-11 ENCOUNTER — TELEPHONE (OUTPATIENT)
Dept: UROLOGY | Facility: CLINIC | Age: 86
End: 2021-10-11
Payer: MEDICARE

## 2021-10-11 RX ORDER — CEPHALEXIN 500 MG/1
500 CAPSULE ORAL EVERY 8 HOURS
Qty: 62 CAPSULE | Refills: 0 | Status: SHIPPED | OUTPATIENT
Start: 2021-10-11 | End: 2021-10-25

## 2021-10-12 ENCOUNTER — PATIENT MESSAGE (OUTPATIENT)
Dept: UROLOGY | Facility: CLINIC | Age: 86
End: 2021-10-12

## 2021-10-15 ENCOUNTER — IMMUNIZATION (OUTPATIENT)
Dept: FAMILY MEDICINE | Facility: CLINIC | Age: 86
End: 2021-10-15
Payer: MEDICARE

## 2021-10-15 ENCOUNTER — HOSPITAL ENCOUNTER (OUTPATIENT)
Dept: RADIOLOGY | Facility: HOSPITAL | Age: 86
Discharge: HOME | End: 2021-10-15
Attending: THORACIC SURGERY (CARDIOTHORACIC VASCULAR SURGERY)
Payer: MEDICARE

## 2021-10-15 DIAGNOSIS — I73.9 PAD (PERIPHERAL ARTERY DISEASE): ICD-10-CM

## 2021-10-15 DIAGNOSIS — Z23 NEED FOR VACCINATION: Primary | ICD-10-CM

## 2021-10-15 PROCEDURE — 93925 US ARTERIAL LOWER EXTREMITY BILAT WITH ABI (XPD): ICD-10-PCS | Mod: 26,,, | Performed by: RADIOLOGY

## 2021-10-15 PROCEDURE — 93922 UPR/L XTREMITY ART 2 LEVELS: CPT | Mod: TC,PO

## 2021-10-15 PROCEDURE — 93922 UPR/L XTREMITY ART 2 LEVELS: CPT | Mod: 26,,, | Performed by: RADIOLOGY

## 2021-10-15 PROCEDURE — 0003A COVID-19, MRNA, LNP-S, PF, 30 MCG/0.3 ML DOSE VACCINE: CPT | Mod: PBBFAC,PO

## 2021-10-15 PROCEDURE — 93922 US ARTERIAL LOWER EXTREMITY BILAT WITH ABI (XPD): ICD-10-PCS | Mod: 26,,, | Performed by: RADIOLOGY

## 2021-10-15 PROCEDURE — 91300 COVID-19, MRNA, LNP-S, PF, 30 MCG/0.3 ML DOSE VACCINE: CPT | Mod: PBBFAC,PO

## 2021-10-15 PROCEDURE — 93925 LOWER EXTREMITY STUDY: CPT | Mod: 26,,, | Performed by: RADIOLOGY

## 2021-10-20 ENCOUNTER — PATIENT MESSAGE (OUTPATIENT)
Dept: SURGERY | Facility: CLINIC | Age: 86
End: 2021-10-20

## 2021-10-20 ENCOUNTER — PATIENT MESSAGE (OUTPATIENT)
Dept: SURGERY | Facility: CLINIC | Age: 86
End: 2021-10-20
Payer: MEDICARE

## 2021-10-20 DIAGNOSIS — Z85.048 HISTORY OF RECTAL CANCER: Primary | ICD-10-CM

## 2021-10-27 ENCOUNTER — OFFICE VISIT (OUTPATIENT)
Dept: SURGERY | Facility: CLINIC | Age: 86
End: 2021-10-27
Payer: MEDICARE

## 2021-10-27 DIAGNOSIS — Z85.038 HISTORY OF COLON CANCER: Primary | ICD-10-CM

## 2021-10-27 DIAGNOSIS — R97.0 ELEVATED CEA: ICD-10-CM

## 2021-10-27 PROCEDURE — 99213 PR OFFICE/OUTPT VISIT, EST, LEVL III, 20-29 MIN: ICD-10-PCS | Mod: 95,,, | Performed by: SURGERY

## 2021-10-27 PROCEDURE — 99213 OFFICE O/P EST LOW 20 MIN: CPT | Mod: 95,,, | Performed by: SURGERY

## 2021-10-29 ENCOUNTER — TELEPHONE (OUTPATIENT)
Dept: SURGERY | Facility: CLINIC | Age: 86
End: 2021-10-29
Payer: MEDICARE

## 2021-11-30 ENCOUNTER — HOSPITAL ENCOUNTER (OUTPATIENT)
Dept: RADIOLOGY | Facility: HOSPITAL | Age: 86
Discharge: HOME | End: 2021-11-30
Attending: SURGERY
Payer: MEDICARE

## 2021-11-30 DIAGNOSIS — R97.0 ELEVATED CEA: ICD-10-CM

## 2021-11-30 DIAGNOSIS — Z85.038 HISTORY OF COLON CANCER: ICD-10-CM

## 2021-11-30 PROCEDURE — 74177 CT ABDOMEN PELVIS WITH CONTRAST: ICD-10-PCS | Mod: 26,,, | Performed by: RADIOLOGY

## 2021-11-30 PROCEDURE — 25500020 PHARM REV CODE 255: Mod: PO | Performed by: SURGERY

## 2021-11-30 PROCEDURE — A9698 NON-RAD CONTRAST MATERIALNOC: HCPCS | Mod: PO | Performed by: SURGERY

## 2021-11-30 PROCEDURE — 74177 CT ABD & PELVIS W/CONTRAST: CPT | Mod: TC,PO

## 2021-11-30 PROCEDURE — 74177 CT ABD & PELVIS W/CONTRAST: CPT | Mod: 26,,, | Performed by: RADIOLOGY

## 2021-11-30 RX ADMIN — IOHEXOL 75 ML: 350 INJECTION, SOLUTION INTRAVENOUS at 10:11

## 2021-11-30 RX ADMIN — IOHEXOL 1000 ML: 9 SOLUTION ORAL at 10:11

## 2021-12-01 ENCOUNTER — PATIENT MESSAGE (OUTPATIENT)
Dept: SURGERY | Facility: CLINIC | Age: 86
End: 2021-12-01
Payer: MEDICARE

## 2021-12-06 ENCOUNTER — TELEPHONE (OUTPATIENT)
Dept: SURGERY | Facility: CLINIC | Age: 86
End: 2021-12-06
Payer: MEDICARE

## 2021-12-06 RX ORDER — CIPROFLOXACIN 500 MG/1
TABLET ORAL
COMMUNITY
End: 2021-01-01 | Stop reason: CLARIF

## 2021-12-06 RX ORDER — DOXYCYCLINE 100 MG/1
100 CAPSULE ORAL 2 TIMES DAILY
COMMUNITY
Start: 2021-09-21 | End: 2021-01-01 | Stop reason: CLARIF

## 2021-12-07 ENCOUNTER — PATIENT MESSAGE (OUTPATIENT)
Dept: SURGERY | Facility: CLINIC | Age: 86
End: 2021-12-07
Payer: MEDICARE

## 2021-12-27 PROBLEM — Z00.00 ANNUAL PHYSICAL EXAM: Status: RESOLVED | Noted: 2021-09-16 | Resolved: 2021-01-01

## 2022-01-01 ENCOUNTER — OUTPATIENT CASE MANAGEMENT (OUTPATIENT)
Dept: ADMINISTRATIVE | Facility: OTHER | Age: 87
End: 2022-01-01
Payer: MEDICARE

## 2022-01-01 ENCOUNTER — PATIENT MESSAGE (OUTPATIENT)
Dept: UROLOGY | Facility: CLINIC | Age: 87
End: 2022-01-01
Payer: MEDICARE

## 2022-01-01 ENCOUNTER — PATIENT MESSAGE (OUTPATIENT)
Dept: SURGERY | Facility: CLINIC | Age: 87
End: 2022-01-01
Payer: MEDICARE

## 2022-01-01 ENCOUNTER — DOCUMENTATION ONLY (OUTPATIENT)
Dept: INFUSION THERAPY | Facility: HOSPITAL | Age: 87
End: 2022-01-01
Payer: MEDICARE

## 2022-01-01 ENCOUNTER — TELEPHONE (OUTPATIENT)
Dept: HEMATOLOGY/ONCOLOGY | Facility: CLINIC | Age: 87
End: 2022-01-01
Payer: MEDICARE

## 2022-01-01 ENCOUNTER — OFFICE VISIT (OUTPATIENT)
Dept: CARDIOLOGY | Facility: CLINIC | Age: 87
End: 2022-01-01
Payer: MEDICARE

## 2022-01-01 ENCOUNTER — TELEPHONE (OUTPATIENT)
Dept: SURGERY | Facility: CLINIC | Age: 87
End: 2022-01-01
Payer: MEDICARE

## 2022-01-01 ENCOUNTER — OFFICE VISIT (OUTPATIENT)
Dept: HEMATOLOGY/ONCOLOGY | Facility: CLINIC | Age: 87
End: 2022-01-01
Payer: MEDICARE

## 2022-01-01 ENCOUNTER — TELEPHONE (OUTPATIENT)
Dept: UROLOGY | Facility: CLINIC | Age: 87
End: 2022-01-01
Payer: MEDICARE

## 2022-01-01 ENCOUNTER — OFFICE VISIT (OUTPATIENT)
Dept: UROLOGY | Facility: CLINIC | Age: 87
End: 2022-01-01
Payer: MEDICARE

## 2022-01-01 ENCOUNTER — TELEPHONE (OUTPATIENT)
Dept: UROLOGY | Facility: CLINIC | Age: 87
End: 2022-01-01

## 2022-01-01 ENCOUNTER — OFFICE VISIT (OUTPATIENT)
Dept: INFECTIOUS DISEASES | Facility: CLINIC | Age: 87
End: 2022-01-01
Payer: MEDICARE

## 2022-01-01 ENCOUNTER — CLINICAL SUPPORT (OUTPATIENT)
Dept: HEMATOLOGY/ONCOLOGY | Facility: CLINIC | Age: 87
End: 2022-01-01
Payer: MEDICARE

## 2022-01-01 ENCOUNTER — TELEPHONE (OUTPATIENT)
Dept: INFUSION THERAPY | Facility: HOSPITAL | Age: 87
End: 2022-01-01
Payer: MEDICARE

## 2022-01-01 ENCOUNTER — HOSPITAL ENCOUNTER (OUTPATIENT)
Dept: RADIOLOGY | Facility: HOSPITAL | Age: 87
Discharge: HOME | End: 2022-03-16
Attending: UROLOGY
Payer: MEDICARE

## 2022-01-01 ENCOUNTER — INFUSION (OUTPATIENT)
Dept: INFUSION THERAPY | Facility: HOSPITAL | Age: 87
End: 2022-01-01
Attending: INTERNAL MEDICINE
Payer: MEDICARE

## 2022-01-01 ENCOUNTER — PATIENT MESSAGE (OUTPATIENT)
Dept: ADMINISTRATIVE | Facility: OTHER | Age: 87
End: 2022-01-01
Payer: MEDICARE

## 2022-01-01 ENCOUNTER — HOSPITAL ENCOUNTER (OUTPATIENT)
Dept: RADIOLOGY | Facility: HOSPITAL | Age: 87
Discharge: HOME | End: 2022-06-17
Attending: SURGERY
Payer: MEDICARE

## 2022-01-01 ENCOUNTER — IMMUNIZATION (OUTPATIENT)
Dept: FAMILY MEDICINE | Facility: CLINIC | Age: 87
End: 2022-01-01
Payer: MEDICARE

## 2022-01-01 ENCOUNTER — OFFICE VISIT (OUTPATIENT)
Dept: SURGERY | Facility: CLINIC | Age: 87
End: 2022-01-01
Payer: MEDICARE

## 2022-01-01 ENCOUNTER — LAB VISIT (OUTPATIENT)
Dept: LAB | Facility: HOSPITAL | Age: 87
End: 2022-01-01
Attending: INTERNAL MEDICINE
Payer: MEDICARE

## 2022-01-01 ENCOUNTER — TELEPHONE (OUTPATIENT)
Dept: INFECTIOUS DISEASES | Facility: CLINIC | Age: 87
End: 2022-01-01
Payer: MEDICARE

## 2022-01-01 ENCOUNTER — PATIENT MESSAGE (OUTPATIENT)
Dept: HEMATOLOGY/ONCOLOGY | Facility: CLINIC | Age: 87
End: 2022-01-01
Payer: MEDICARE

## 2022-01-01 ENCOUNTER — TELEPHONE (OUTPATIENT)
Dept: CARDIOLOGY | Facility: CLINIC | Age: 87
End: 2022-01-01
Payer: MEDICARE

## 2022-01-01 ENCOUNTER — TELEPHONE (OUTPATIENT)
Dept: PHARMACY | Facility: CLINIC | Age: 87
End: 2022-01-01
Payer: MEDICARE

## 2022-01-01 ENCOUNTER — HOSPITAL ENCOUNTER (OUTPATIENT)
Dept: RADIOLOGY | Facility: HOSPITAL | Age: 87
Discharge: HOME | End: 2022-09-19
Attending: INTERNAL MEDICINE
Payer: MEDICARE

## 2022-01-01 ENCOUNTER — HOSPITAL ENCOUNTER (OUTPATIENT)
Dept: RADIOLOGY | Facility: HOSPITAL | Age: 87
Discharge: HOME | End: 2022-04-28
Attending: UROLOGY
Payer: MEDICARE

## 2022-01-01 VITALS
DIASTOLIC BLOOD PRESSURE: 58 MMHG | SYSTOLIC BLOOD PRESSURE: 104 MMHG | OXYGEN SATURATION: 98 % | BODY MASS INDEX: 22.1 KG/M2 | HEART RATE: 65 BPM | HEIGHT: 72 IN | TEMPERATURE: 98 F | RESPIRATION RATE: 18 BRPM

## 2022-01-01 VITALS
HEIGHT: 72 IN | WEIGHT: 172 LBS | TEMPERATURE: 97 F | HEART RATE: 61 BPM | SYSTOLIC BLOOD PRESSURE: 129 MMHG | BODY MASS INDEX: 23.3 KG/M2 | DIASTOLIC BLOOD PRESSURE: 60 MMHG

## 2022-01-01 VITALS
WEIGHT: 171 LBS | OXYGEN SATURATION: 98 % | HEART RATE: 80 BPM | DIASTOLIC BLOOD PRESSURE: 60 MMHG | BODY MASS INDEX: 23.16 KG/M2 | TEMPERATURE: 97 F | SYSTOLIC BLOOD PRESSURE: 96 MMHG | HEIGHT: 72 IN

## 2022-01-01 VITALS
SYSTOLIC BLOOD PRESSURE: 119 MMHG | HEART RATE: 82 BPM | RESPIRATION RATE: 16 BRPM | TEMPERATURE: 98 F | HEIGHT: 72 IN | DIASTOLIC BLOOD PRESSURE: 60 MMHG | WEIGHT: 171.06 LBS | TEMPERATURE: 97 F | SYSTOLIC BLOOD PRESSURE: 100 MMHG | HEIGHT: 72 IN | OXYGEN SATURATION: 99 % | HEART RATE: 61 BPM | WEIGHT: 172 LBS | BODY MASS INDEX: 23.17 KG/M2 | DIASTOLIC BLOOD PRESSURE: 55 MMHG | BODY MASS INDEX: 23.3 KG/M2

## 2022-01-01 VITALS
DIASTOLIC BLOOD PRESSURE: 50 MMHG | HEIGHT: 72 IN | BODY MASS INDEX: 22.07 KG/M2 | SYSTOLIC BLOOD PRESSURE: 108 MMHG | WEIGHT: 162.94 LBS | HEART RATE: 57 BPM

## 2022-01-01 VITALS — HEIGHT: 72 IN | WEIGHT: 174.81 LBS | BODY MASS INDEX: 23.68 KG/M2

## 2022-01-01 VITALS
RESPIRATION RATE: 16 BRPM | TEMPERATURE: 97 F | DIASTOLIC BLOOD PRESSURE: 61 MMHG | HEART RATE: 63 BPM | BODY MASS INDEX: 22.07 KG/M2 | HEIGHT: 72 IN | WEIGHT: 162.94 LBS | SYSTOLIC BLOOD PRESSURE: 126 MMHG | OXYGEN SATURATION: 98 %

## 2022-01-01 VITALS
HEIGHT: 72 IN | HEART RATE: 62 BPM | TEMPERATURE: 98 F | BODY MASS INDEX: 23.3 KG/M2 | OXYGEN SATURATION: 99 % | DIASTOLIC BLOOD PRESSURE: 60 MMHG | SYSTOLIC BLOOD PRESSURE: 110 MMHG | WEIGHT: 172 LBS

## 2022-01-01 VITALS
WEIGHT: 171 LBS | SYSTOLIC BLOOD PRESSURE: 100 MMHG | TEMPERATURE: 97 F | DIASTOLIC BLOOD PRESSURE: 60 MMHG | HEIGHT: 72 IN | OXYGEN SATURATION: 95 % | BODY MASS INDEX: 23.16 KG/M2 | HEART RATE: 50 BPM

## 2022-01-01 VITALS
WEIGHT: 163 LBS | HEART RATE: 49 BPM | DIASTOLIC BLOOD PRESSURE: 58 MMHG | OXYGEN SATURATION: 98 % | BODY MASS INDEX: 22.08 KG/M2 | TEMPERATURE: 97 F | HEIGHT: 72 IN | SYSTOLIC BLOOD PRESSURE: 112 MMHG

## 2022-01-01 VITALS
HEART RATE: 51 BPM | RESPIRATION RATE: 18 BRPM | WEIGHT: 163 LBS | TEMPERATURE: 98 F | OXYGEN SATURATION: 98 % | SYSTOLIC BLOOD PRESSURE: 133 MMHG | BODY MASS INDEX: 22.08 KG/M2 | HEIGHT: 72 IN | DIASTOLIC BLOOD PRESSURE: 51 MMHG

## 2022-01-01 DIAGNOSIS — Z79.01 ANTICOAGULANT LONG-TERM USE: ICD-10-CM

## 2022-01-01 DIAGNOSIS — N20.1 URETEROLITHIASIS: ICD-10-CM

## 2022-01-01 DIAGNOSIS — E11.9 TYPE 2 DIABETES MELLITUS WITHOUT COMPLICATION, WITH LONG-TERM CURRENT USE OF INSULIN: ICD-10-CM

## 2022-01-01 DIAGNOSIS — I50.22 CHF NYHA CLASS II, CHRONIC, SYSTOLIC: ICD-10-CM

## 2022-01-01 DIAGNOSIS — N39.0 E-COLI UTI: ICD-10-CM

## 2022-01-01 DIAGNOSIS — D89.89 OTHER SPECIFIED DISORDERS INVOLVING THE IMMUNE MECHANISM, NOT ELSEWHERE CLASSIFIED: ICD-10-CM

## 2022-01-01 DIAGNOSIS — N20.0 KIDNEY STONE: Primary | ICD-10-CM

## 2022-01-01 DIAGNOSIS — D63.0 ANEMIA IN NEOPLASTIC DISEASE: ICD-10-CM

## 2022-01-01 DIAGNOSIS — N20.0 KIDNEY STONE: ICD-10-CM

## 2022-01-01 DIAGNOSIS — C79.51 BONE METASTASES: ICD-10-CM

## 2022-01-01 DIAGNOSIS — I48.91 ATRIAL FIBRILLATION, UNSPECIFIED TYPE: ICD-10-CM

## 2022-01-01 DIAGNOSIS — Z79.4 TYPE 2 DIABETES MELLITUS WITHOUT COMPLICATION, WITH LONG-TERM CURRENT USE OF INSULIN: ICD-10-CM

## 2022-01-01 DIAGNOSIS — C22.0 CANCER, HEPATOCELLULAR: ICD-10-CM

## 2022-01-01 DIAGNOSIS — C77.2 MALIGNANT NEOPLASM METASTATIC TO INTRA-ABDOMINAL LYMPH NODE: ICD-10-CM

## 2022-01-01 DIAGNOSIS — Z79.4: ICD-10-CM

## 2022-01-01 DIAGNOSIS — R33.9 URINARY RETENTION: ICD-10-CM

## 2022-01-01 DIAGNOSIS — C22.0 CANCER, HEPATOCELLULAR: Primary | ICD-10-CM

## 2022-01-01 DIAGNOSIS — Z85.51 HISTORY OF BLADDER CANCER: ICD-10-CM

## 2022-01-01 DIAGNOSIS — I25.10 CORONARY ARTERY DISEASE, UNSPECIFIED VESSEL OR LESION TYPE, UNSPECIFIED WHETHER ANGINA PRESENT, UNSPECIFIED WHETHER NATIVE OR TRANSPLANTED HEART: ICD-10-CM

## 2022-01-01 DIAGNOSIS — C78.7 METASTATIC COLON CANCER TO LIVER: ICD-10-CM

## 2022-01-01 DIAGNOSIS — N30.00 ACUTE CYSTITIS WITHOUT HEMATURIA: Primary | ICD-10-CM

## 2022-01-01 DIAGNOSIS — Z85.51 HISTORY OF BLADDER CANCER: Primary | ICD-10-CM

## 2022-01-01 DIAGNOSIS — Z85.038 HISTORY OF COLON CANCER: ICD-10-CM

## 2022-01-01 DIAGNOSIS — I73.9 PAD (PERIPHERAL ARTERY DISEASE): ICD-10-CM

## 2022-01-01 DIAGNOSIS — Z23 NEED FOR VACCINATION: Primary | ICD-10-CM

## 2022-01-01 DIAGNOSIS — C78.7 MALIGNANT NEOPLASM METASTATIC TO LIVER: ICD-10-CM

## 2022-01-01 DIAGNOSIS — N39.0 COMPLICATED UTI (URINARY TRACT INFECTION): Primary | ICD-10-CM

## 2022-01-01 DIAGNOSIS — I48.20 CHRONIC ATRIAL FIBRILLATION: ICD-10-CM

## 2022-01-01 DIAGNOSIS — G93.41 ACUTE METABOLIC ENCEPHALOPATHY: Primary | ICD-10-CM

## 2022-01-01 DIAGNOSIS — R82.90 CLOUDY URINE: ICD-10-CM

## 2022-01-01 DIAGNOSIS — E08.610: ICD-10-CM

## 2022-01-01 DIAGNOSIS — B95.2 UTI (URINARY TRACT INFECTION) DUE TO ENTEROCOCCUS: ICD-10-CM

## 2022-01-01 DIAGNOSIS — N40.1 ENLARGED PROSTATE WITH URINARY OBSTRUCTION: ICD-10-CM

## 2022-01-01 DIAGNOSIS — B96.20 E-COLI UTI: ICD-10-CM

## 2022-01-01 DIAGNOSIS — C22.0 HCC (HEPATOCELLULAR CARCINOMA): Primary | ICD-10-CM

## 2022-01-01 DIAGNOSIS — C22.0 HCC (HEPATOCELLULAR CARCINOMA): ICD-10-CM

## 2022-01-01 DIAGNOSIS — I10 ESSENTIAL HYPERTENSION: ICD-10-CM

## 2022-01-01 DIAGNOSIS — R97.0 ELEVATED CEA: ICD-10-CM

## 2022-01-01 DIAGNOSIS — E78.5 DYSLIPIDEMIA, GOAL LDL BELOW 100: ICD-10-CM

## 2022-01-01 DIAGNOSIS — N39.0 FREQUENT UTI: ICD-10-CM

## 2022-01-01 DIAGNOSIS — N20.1 URETEROLITHIASIS: Primary | ICD-10-CM

## 2022-01-01 DIAGNOSIS — R97.0 ELEVATED CEA: Primary | ICD-10-CM

## 2022-01-01 DIAGNOSIS — R05.9 COUGH: Primary | ICD-10-CM

## 2022-01-01 DIAGNOSIS — C18.9 METASTATIC COLON CANCER TO LIVER: ICD-10-CM

## 2022-01-01 DIAGNOSIS — N13.8 ENLARGED PROSTATE WITH URINARY OBSTRUCTION: ICD-10-CM

## 2022-01-01 DIAGNOSIS — N39.0 UTI (URINARY TRACT INFECTION) DUE TO ENTEROCOCCUS: ICD-10-CM

## 2022-01-01 DIAGNOSIS — Z71.89 ACP (ADVANCE CARE PLANNING): ICD-10-CM

## 2022-01-01 DIAGNOSIS — C78.7 LIVER METASTASES: Primary | ICD-10-CM

## 2022-01-01 LAB
ALBUMIN SERPL BCP-MCNC: 3.3 G/DL (ref 3.5–5.2)
ALBUMIN SERPL BCP-MCNC: 3.4 G/DL (ref 3.5–5.2)
ALP SERPL-CCNC: 58 U/L (ref 55–135)
ALP SERPL-CCNC: 74 U/L (ref 55–135)
ALT SERPL W/O P-5'-P-CCNC: 15 U/L (ref 10–44)
ALT SERPL W/O P-5'-P-CCNC: 16 U/L (ref 10–44)
ANION GAP SERPL CALC-SCNC: 12 MMOL/L (ref 8–16)
ANION GAP SERPL CALC-SCNC: 8 MMOL/L (ref 8–16)
AST SERPL-CCNC: 20 U/L (ref 10–40)
AST SERPL-CCNC: 25 U/L (ref 10–40)
BASOPHILS # BLD AUTO: 0.02 K/UL (ref 0–0.2)
BASOPHILS # BLD AUTO: 0.03 K/UL (ref 0–0.2)
BASOPHILS NFR BLD: 0.2 % (ref 0–1.9)
BASOPHILS NFR BLD: 0.3 % (ref 0–1.9)
BILIRUB SERPL-MCNC: 0.6 MG/DL (ref 0.1–1)
BILIRUB SERPL-MCNC: 0.7 MG/DL (ref 0.1–1)
BUN SERPL-MCNC: 18 MG/DL (ref 8–23)
BUN SERPL-MCNC: 24 MG/DL (ref 8–23)
CALCIUM SERPL-MCNC: 9.5 MG/DL (ref 8.7–10.5)
CALCIUM SERPL-MCNC: 9.7 MG/DL (ref 8.7–10.5)
CHLORIDE SERPL-SCNC: 103 MMOL/L (ref 95–110)
CHLORIDE SERPL-SCNC: 105 MMOL/L (ref 95–110)
CO2 SERPL-SCNC: 21 MMOL/L (ref 23–29)
CO2 SERPL-SCNC: 26 MMOL/L (ref 23–29)
CREAT SERPL-MCNC: 1 MG/DL (ref 0.5–1.4)
CREAT SERPL-MCNC: 1 MG/DL (ref 0.5–1.4)
DIFFERENTIAL METHOD: ABNORMAL
DIFFERENTIAL METHOD: ABNORMAL
EOSINOPHIL # BLD AUTO: 0.3 K/UL (ref 0–0.5)
EOSINOPHIL # BLD AUTO: 0.3 K/UL (ref 0–0.5)
EOSINOPHIL NFR BLD: 2.4 % (ref 0–8)
EOSINOPHIL NFR BLD: 2.4 % (ref 0–8)
ERYTHROCYTE [DISTWIDTH] IN BLOOD BY AUTOMATED COUNT: 14.7 % (ref 11.5–14.5)
ERYTHROCYTE [DISTWIDTH] IN BLOOD BY AUTOMATED COUNT: 14.9 % (ref 11.5–14.5)
EST. GFR  (AFRICAN AMERICAN): >60 ML/MIN/1.73 M^2
EST. GFR  (AFRICAN AMERICAN): >60 ML/MIN/1.73 M^2
EST. GFR  (NON AFRICAN AMERICAN): >60 ML/MIN/1.73 M^2
EST. GFR  (NON AFRICAN AMERICAN): >60 ML/MIN/1.73 M^2
FERRITIN SERPL-MCNC: 333 NG/ML (ref 20–300)
GLUCOSE SERPL-MCNC: 135 MG/DL (ref 70–110)
GLUCOSE SERPL-MCNC: 143 MG/DL (ref 70–110)
GLUCOSE SERPL-MCNC: 185 MG/DL (ref 70–110)
GLUCOSE SERPL-MCNC: 77 MG/DL (ref 70–110)
HCT VFR BLD AUTO: 37.7 % (ref 40–54)
HCT VFR BLD AUTO: 43.2 % (ref 40–54)
HGB BLD-MCNC: 12.6 G/DL (ref 14–18)
HGB BLD-MCNC: 14 G/DL (ref 14–18)
IMM GRANULOCYTES # BLD AUTO: 0.03 K/UL (ref 0–0.04)
IMM GRANULOCYTES # BLD AUTO: 0.05 K/UL (ref 0–0.04)
IMM GRANULOCYTES NFR BLD AUTO: 0.3 % (ref 0–0.5)
IMM GRANULOCYTES NFR BLD AUTO: 0.4 % (ref 0–0.5)
IRON SERPL-MCNC: 80 UG/DL (ref 45–160)
LYMPHOCYTES # BLD AUTO: 2.5 K/UL (ref 1–4.8)
LYMPHOCYTES # BLD AUTO: 2.6 K/UL (ref 1–4.8)
LYMPHOCYTES NFR BLD: 22 % (ref 18–48)
LYMPHOCYTES NFR BLD: 24.5 % (ref 18–48)
MCH RBC QN AUTO: 31.2 PG (ref 27–31)
MCH RBC QN AUTO: 31.6 PG (ref 27–31)
MCHC RBC AUTO-ENTMCNC: 32.4 G/DL (ref 32–36)
MCHC RBC AUTO-ENTMCNC: 33.4 G/DL (ref 32–36)
MCV RBC AUTO: 95 FL (ref 82–98)
MCV RBC AUTO: 96 FL (ref 82–98)
MONOCYTES # BLD AUTO: 1.2 K/UL (ref 0.3–1)
MONOCYTES # BLD AUTO: 1.2 K/UL (ref 0.3–1)
MONOCYTES NFR BLD: 10.4 % (ref 4–15)
MONOCYTES NFR BLD: 11 % (ref 4–15)
NEUTROPHILS # BLD AUTO: 6.5 K/UL (ref 1.8–7.7)
NEUTROPHILS # BLD AUTO: 7.3 K/UL (ref 1.8–7.7)
NEUTROPHILS NFR BLD: 61.5 % (ref 38–73)
NEUTROPHILS NFR BLD: 64.6 % (ref 38–73)
NRBC BLD-RTO: 0 /100 WBC
NRBC BLD-RTO: 0 /100 WBC
PLATELET # BLD AUTO: 199 K/UL (ref 150–450)
PLATELET # BLD AUTO: 207 K/UL (ref 150–450)
PMV BLD AUTO: 8.8 FL (ref 9.2–12.9)
PMV BLD AUTO: 9 FL (ref 9.2–12.9)
POTASSIUM SERPL-SCNC: 4.5 MMOL/L (ref 3.5–5.1)
POTASSIUM SERPL-SCNC: 4.6 MMOL/L (ref 3.5–5.1)
PROT SERPL-MCNC: 6.6 G/DL (ref 6–8.4)
PROT SERPL-MCNC: 7.2 G/DL (ref 6–8.4)
RBC # BLD AUTO: 3.99 M/UL (ref 4.6–6.2)
RBC # BLD AUTO: 4.49 M/UL (ref 4.6–6.2)
SATURATED IRON: 26 % (ref 20–50)
SODIUM SERPL-SCNC: 137 MMOL/L (ref 136–145)
SODIUM SERPL-SCNC: 138 MMOL/L (ref 136–145)
TOTAL IRON BINDING CAPACITY: 305 UG/DL (ref 250–450)
TRANSFERRIN SERPL-MCNC: 206 MG/DL (ref 200–375)
TSH SERPL DL<=0.005 MIU/L-ACNC: 1.95 UIU/ML (ref 0.4–4)
WBC # BLD AUTO: 10.57 K/UL (ref 3.9–12.7)
WBC # BLD AUTO: 11.24 K/UL (ref 3.9–12.7)

## 2022-01-01 PROCEDURE — 99214 OFFICE O/P EST MOD 30 MIN: CPT | Mod: 95,,, | Performed by: UROLOGY

## 2022-01-01 PROCEDURE — 99205 OFFICE O/P NEW HI 60 MIN: CPT | Mod: S$PBB,,, | Performed by: INTERNAL MEDICINE

## 2022-01-01 PROCEDURE — 74018 RADEX ABDOMEN 1 VIEW: CPT | Mod: TC,FY,PO

## 2022-01-01 PROCEDURE — 99213 OFFICE O/P EST LOW 20 MIN: CPT | Mod: S$PBB,,, | Performed by: SURGERY

## 2022-01-01 PROCEDURE — 99205 PR OFFICE/OUTPT VISIT, NEW, LEVL V, 60-74 MIN: ICD-10-PCS | Mod: S$PBB,,, | Performed by: INTERNAL MEDICINE

## 2022-01-01 PROCEDURE — 99215 OFFICE O/P EST HI 40 MIN: CPT | Mod: PBBFAC,PN | Performed by: INTERNAL MEDICINE

## 2022-01-01 PROCEDURE — 78815 NM PET CT ROUTINE: ICD-10-PCS | Mod: 26,PS,, | Performed by: RADIOLOGY

## 2022-01-01 PROCEDURE — 99999 PR PBB SHADOW E&M-EST. PATIENT-LVL V: ICD-10-PCS | Mod: PBBFAC,,, | Performed by: INTERNAL MEDICINE

## 2022-01-01 PROCEDURE — 99213 OFFICE O/P EST LOW 20 MIN: CPT | Mod: S$PBB,,, | Performed by: UROLOGY

## 2022-01-01 PROCEDURE — 99214 PR OFFICE/OUTPT VISIT, EST, LEVL IV, 30-39 MIN: ICD-10-PCS | Mod: S$PBB,,, | Performed by: INTERNAL MEDICINE

## 2022-01-01 PROCEDURE — 85025 COMPLETE CBC W/AUTO DIFF WBC: CPT | Mod: PN | Performed by: INTERNAL MEDICINE

## 2022-01-01 PROCEDURE — 99999 PR PBB SHADOW E&M-EST. PATIENT-LVL V: ICD-10-PCS | Mod: PBBFAC,,, | Performed by: SURGERY

## 2022-01-01 PROCEDURE — 99215 PR OFFICE/OUTPT VISIT, EST, LEVL V, 40-54 MIN: ICD-10-PCS | Mod: S$PBB,,, | Performed by: INTERNAL MEDICINE

## 2022-01-01 PROCEDURE — 80053 COMPREHEN METABOLIC PANEL: CPT | Mod: PN | Performed by: INTERNAL MEDICINE

## 2022-01-01 PROCEDURE — 78815 PET IMAGE W/CT SKULL-THIGH: CPT | Mod: TC,PS,PN

## 2022-01-01 PROCEDURE — 99215 OFFICE O/P EST HI 40 MIN: CPT | Mod: S$PBB,,,

## 2022-01-01 PROCEDURE — 78815 PET IMAGE W/CT SKULL-THIGH: CPT | Mod: 26,PS,, | Performed by: RADIOLOGY

## 2022-01-01 PROCEDURE — 99204 PR OFFICE/OUTPT VISIT, NEW, LEVL IV, 45-59 MIN: ICD-10-PCS | Mod: 95,,, | Performed by: INTERNAL MEDICINE

## 2022-01-01 PROCEDURE — 99215 OFFICE O/P EST HI 40 MIN: CPT | Mod: S$PBB,,, | Performed by: INTERNAL MEDICINE

## 2022-01-01 PROCEDURE — 99213 PR OFFICE/OUTPT VISIT, EST, LEVL III, 20-29 MIN: ICD-10-PCS | Mod: S$PBB,,, | Performed by: UROLOGY

## 2022-01-01 PROCEDURE — 99215 PR OFFICE/OUTPT VISIT, EST, LEVL V, 40-54 MIN: ICD-10-PCS | Mod: S$PBB,,,

## 2022-01-01 PROCEDURE — 82728 ASSAY OF FERRITIN: CPT | Performed by: INTERNAL MEDICINE

## 2022-01-01 PROCEDURE — 52310 CYSTOSCOPY AND TREATMENT: CPT | Mod: PBBFAC,PO | Performed by: UROLOGY

## 2022-01-01 PROCEDURE — 99213 OFFICE O/P EST LOW 20 MIN: CPT | Mod: S$PBB,,, | Performed by: INTERNAL MEDICINE

## 2022-01-01 PROCEDURE — 99999 PR PBB SHADOW E&M-EST. PATIENT-LVL III: CPT | Mod: PBBFAC,,, | Performed by: UROLOGY

## 2022-01-01 PROCEDURE — 99999 PR PBB SHADOW E&M-EST. PATIENT-LVL III: ICD-10-PCS | Mod: PBBFAC,,, | Performed by: INTERNAL MEDICINE

## 2022-01-01 PROCEDURE — 99999 PR PBB SHADOW E&M-EST. PATIENT-LVL V: ICD-10-PCS | Mod: PBBFAC,,,

## 2022-01-01 PROCEDURE — 74018 XR ABDOMEN AP 1 VIEW: ICD-10-PCS | Mod: 26,,, | Performed by: RADIOLOGY

## 2022-01-01 PROCEDURE — 99999 PR PBB SHADOW E&M-EST. PATIENT-LVL V: CPT | Mod: PBBFAC,,, | Performed by: INTERNAL MEDICINE

## 2022-01-01 PROCEDURE — 99215 OFFICE O/P EST HI 40 MIN: CPT | Mod: PBBFAC,PO | Performed by: SURGERY

## 2022-01-01 PROCEDURE — 99213 PR OFFICE/OUTPT VISIT, EST, LEVL III, 20-29 MIN: ICD-10-PCS | Mod: S$PBB,,, | Performed by: INTERNAL MEDICINE

## 2022-01-01 PROCEDURE — 99215 OFFICE O/P EST HI 40 MIN: CPT | Mod: PBBFAC,PN,25 | Performed by: INTERNAL MEDICINE

## 2022-01-01 PROCEDURE — 99214 OFFICE O/P EST MOD 30 MIN: CPT | Mod: S$PBB,,, | Performed by: INTERNAL MEDICINE

## 2022-01-01 PROCEDURE — 36415 COLL VENOUS BLD VENIPUNCTURE: CPT | Mod: PN | Performed by: INTERNAL MEDICINE

## 2022-01-01 PROCEDURE — 63600175 PHARM REV CODE 636 W HCPCS: Mod: JG,PN | Performed by: INTERNAL MEDICINE

## 2022-01-01 PROCEDURE — 99999 PR PBB SHADOW E&M-EST. PATIENT-LVL V: CPT | Mod: PBBFAC,,,

## 2022-01-01 PROCEDURE — 96413 CHEMO IV INFUSION 1 HR: CPT | Mod: PN

## 2022-01-01 PROCEDURE — 99999 PR PBB SHADOW E&M-EST. PATIENT-LVL III: CPT | Mod: PBBFAC,,, | Performed by: INTERNAL MEDICINE

## 2022-01-01 PROCEDURE — 99999 PR PBB SHADOW E&M-EST. PATIENT-LVL II: ICD-10-PCS | Mod: PBBFAC,,, | Performed by: UROLOGY

## 2022-01-01 PROCEDURE — 99024 PR POST-OP FOLLOW-UP VISIT: ICD-10-PCS | Mod: POP,,, | Performed by: UROLOGY

## 2022-01-01 PROCEDURE — 99999 PR PBB SHADOW E&M-EST. PATIENT-LVL II: CPT | Mod: PBBFAC,,, | Performed by: UROLOGY

## 2022-01-01 PROCEDURE — 99213 OFFICE O/P EST LOW 20 MIN: CPT | Mod: PBBFAC,PO | Performed by: UROLOGY

## 2022-01-01 PROCEDURE — 99213 PR OFFICE/OUTPT VISIT, EST, LEVL III, 20-29 MIN: ICD-10-PCS | Mod: S$PBB,,, | Performed by: SURGERY

## 2022-01-01 PROCEDURE — 91305 COVID-19, MRNA, LNP-S, PF, 30 MCG/0.3 ML DOSE VACCINE (PFIZER): CPT | Mod: PBBFAC,PO

## 2022-01-01 PROCEDURE — 25000003 PHARM REV CODE 250: Mod: PN | Performed by: INTERNAL MEDICINE

## 2022-01-01 PROCEDURE — 99999 PR PBB SHADOW E&M-EST. PATIENT-LVL V: CPT | Mod: PBBFAC,,, | Performed by: SURGERY

## 2022-01-01 PROCEDURE — 99204 OFFICE O/P NEW MOD 45 MIN: CPT | Mod: 95,,, | Performed by: INTERNAL MEDICINE

## 2022-01-01 PROCEDURE — 99214 PR OFFICE/OUTPT VISIT, EST, LEVL IV, 30-39 MIN: ICD-10-PCS | Mod: 95,,, | Performed by: UROLOGY

## 2022-01-01 PROCEDURE — 99213 OFFICE O/P EST LOW 20 MIN: CPT | Mod: PBBFAC,PN | Performed by: INTERNAL MEDICINE

## 2022-01-01 PROCEDURE — 99212 OFFICE O/P EST SF 10 MIN: CPT | Mod: PBBFAC,PO | Performed by: UROLOGY

## 2022-01-01 PROCEDURE — 99215 OFFICE O/P EST HI 40 MIN: CPT | Mod: PBBFAC,PN

## 2022-01-01 PROCEDURE — 99024 POSTOP FOLLOW-UP VISIT: CPT | Mod: POP,,, | Performed by: UROLOGY

## 2022-01-01 PROCEDURE — 78815 PET IMAGE W/CT SKULL-THIGH: CPT | Mod: TC,PN

## 2022-01-01 PROCEDURE — 84443 ASSAY THYROID STIM HORMONE: CPT | Performed by: INTERNAL MEDICINE

## 2022-01-01 PROCEDURE — 74018 RADEX ABDOMEN 1 VIEW: CPT | Mod: 26,,, | Performed by: RADIOLOGY

## 2022-01-01 PROCEDURE — 99213 OFFICE O/P EST LOW 20 MIN: CPT | Mod: PBBFAC,PO | Performed by: INTERNAL MEDICINE

## 2022-01-01 PROCEDURE — 84466 ASSAY OF TRANSFERRIN: CPT | Performed by: INTERNAL MEDICINE

## 2022-01-01 PROCEDURE — 99999 PR PBB SHADOW E&M-EST. PATIENT-LVL III: ICD-10-PCS | Mod: PBBFAC,,, | Performed by: UROLOGY

## 2022-01-01 RX ORDER — SODIUM CHLORIDE 0.9 % (FLUSH) 0.9 %
10 SYRINGE (ML) INJECTION
Status: DISCONTINUED | OUTPATIENT
Start: 2022-01-01 | End: 2022-01-01 | Stop reason: HOSPADM

## 2022-01-01 RX ORDER — PEN NEEDLE, DIABETIC 32GX 5/32"
NEEDLE, DISPOSABLE MISCELLANEOUS
COMMUNITY
Start: 2022-01-01 | End: 2022-01-01 | Stop reason: SDUPTHER

## 2022-01-01 RX ORDER — SODIUM CHLORIDE 0.9 % (FLUSH) 0.9 %
10 SYRINGE (ML) INJECTION
Status: CANCELLED | OUTPATIENT
Start: 2022-01-01

## 2022-01-01 RX ORDER — HEPARIN 100 UNIT/ML
500 SYRINGE INTRAVENOUS
Status: DISCONTINUED | OUTPATIENT
Start: 2022-01-01 | End: 2022-01-01 | Stop reason: HOSPADM

## 2022-01-01 RX ORDER — EPINEPHRINE 0.3 MG/.3ML
0.3 INJECTION SUBCUTANEOUS ONCE AS NEEDED
Status: DISCONTINUED | OUTPATIENT
Start: 2022-01-01 | End: 2022-01-01 | Stop reason: HOSPADM

## 2022-01-01 RX ORDER — DIPHENHYDRAMINE HYDROCHLORIDE 50 MG/ML
50 INJECTION INTRAMUSCULAR; INTRAVENOUS ONCE AS NEEDED
Status: CANCELLED | OUTPATIENT
Start: 2022-01-01

## 2022-01-01 RX ORDER — CYANOCOBALAMIN (VITAMIN B-12) 500 MCG
6 TABLET ORAL NIGHTLY
COMMUNITY

## 2022-01-01 RX ORDER — GRANULES FOR ORAL 3 G/1
3 POWDER ORAL
Qty: 9 G | Refills: 0 | Status: SHIPPED | OUTPATIENT
Start: 2022-01-01 | End: 2022-01-01

## 2022-01-01 RX ORDER — ACETAMINOPHEN AND CODEINE PHOSPHATE 120; 12 MG/5ML; MG/5ML
5 SOLUTION ORAL EVERY 4 HOURS PRN
Qty: 118 ML | Refills: 0 | Status: SHIPPED | OUTPATIENT
Start: 2022-01-01

## 2022-01-01 RX ORDER — HEPARIN 100 UNIT/ML
500 SYRINGE INTRAVENOUS
Status: CANCELLED | OUTPATIENT
Start: 2022-01-01

## 2022-01-01 RX ORDER — DIPHENHYDRAMINE HYDROCHLORIDE 50 MG/ML
50 INJECTION INTRAMUSCULAR; INTRAVENOUS ONCE AS NEEDED
Status: DISCONTINUED | OUTPATIENT
Start: 2022-01-01 | End: 2022-01-01 | Stop reason: HOSPADM

## 2022-01-01 RX ORDER — EPINEPHRINE 0.3 MG/.3ML
0.3 INJECTION SUBCUTANEOUS ONCE AS NEEDED
Status: CANCELLED | OUTPATIENT
Start: 2022-01-01

## 2022-01-01 RX ORDER — CLOTRIMAZOLE AND BETAMETHASONE DIPROPIONATE 10; .64 MG/G; MG/G
CREAM TOPICAL 2 TIMES DAILY
Qty: 45 G | Refills: 0 | Status: SHIPPED | OUTPATIENT
Start: 2022-01-01

## 2022-01-01 RX ORDER — MULTIVITAMIN
1 TABLET ORAL DAILY
COMMUNITY
End: 2022-01-01 | Stop reason: SDUPTHER

## 2022-01-01 RX ORDER — MUPIROCIN 20 MG/G
OINTMENT TOPICAL
COMMUNITY
Start: 2022-01-01 | End: 2022-01-01 | Stop reason: CLARIF

## 2022-01-01 RX ADMIN — SODIUM CHLORIDE: 0.9 INJECTION, SOLUTION INTRAVENOUS at 10:08

## 2022-01-01 RX ADMIN — SODIUM CHLORIDE 200 MG: 9 INJECTION, SOLUTION INTRAVENOUS at 10:07

## 2022-01-01 RX ADMIN — SODIUM CHLORIDE 200 MG: 9 INJECTION, SOLUTION INTRAVENOUS at 10:09

## 2022-01-01 RX ADMIN — SODIUM CHLORIDE 200 MG: 9 INJECTION, SOLUTION INTRAVENOUS at 11:08

## 2022-01-01 RX ADMIN — SODIUM CHLORIDE: 0.9 INJECTION, SOLUTION INTRAVENOUS at 10:09

## 2022-01-05 NOTE — PROGRESS NOTES
Subjective:       Patient ID: Fabian Shine is a 89 y.o. male.    Chief Complaint: Post-op Evaluation (Cysto with fulguration (states urine in catheter is sometimes cloudy and dark))    HPI     89-year-old with a history of bladder cancer.  He underwent recent cystoscopy and was found have multiple recurrence at the bladder.  The tumor burden was thought to be too great to treat in the office.  He also had a calcification in the floor the bladder which was likely formed on his indwelling catheter balloon.  He underwent bladder biopsy and fulguration of any suspected recurrences 2 weeks ago.  He is overall doing well.  He does have urinary retention with an indwelling catheter.  The pathology proved to be benign.  He has no complaints today.    Review of Systems   Constitutional: Negative for fever.   Genitourinary: Negative for dysuria and hematuria.       Objective:      Physical Exam  Vitals reviewed.   Constitutional:       Appearance: He is well-developed and well-nourished.   Pulmonary:      Effort: Pulmonary effort is normal.   Skin:     Findings: No rash.   Neurological:      Mental Status: He is alert and oriented to person, place, and time.   Psychiatric:         Mood and Affect: Mood and affect normal.         Assessment:       1. History of bladder cancer        Plan:       History of bladder cancer  -     POCT URINE DIPSTICK WITHOUT MICROSCOPE  -     Cystoscopy; Future      follow-up 6 months for cystoscopy

## 2022-01-26 NOTE — TELEPHONE ENCOUNTER
Per [facility] nurse, oxybutynin called in for bladder spasms. Patient's wife informed.

## 2022-01-26 NOTE — TELEPHONE ENCOUNTER
----- Message from Genesis Gallardo sent at 1/26/2022 11:49 AM CST -----  Contact: Patient  Type: Patient Call Back         Who called: Kassy          What is the request in detail: I have Fabian Shine's wife Kassy calling for advice; she states he is experiencing burning and bladder spasms by catheter;  would like something called in for spasms; please advise           Best call back number: [phone]         Additional Information:       [facility] - [place], [place] - [address] [id number] [place] [address]  Phone: [phone] Fax: [phone]               Thank You

## 2022-01-26 NOTE — TELEPHONE ENCOUNTER
----- Message from Jhony Wallace sent at 1/26/2022 12:45 PM CST -----  Type: Needs Medical Advice  Who Called:  Rebecca/ NELY - [facility]    Best Call Back Number: [phone]  Additional Information: Caller states that she would like a callback from [facility] regarding the patient being in severe pain.

## 2022-01-27 PROBLEM — A41.9 SEVERE SEPSIS: Status: ACTIVE | Noted: 2022-01-01

## 2022-01-27 PROBLEM — Z71.89 ACP (ADVANCE CARE PLANNING): Status: ACTIVE | Noted: 2020-02-17

## 2022-01-27 PROBLEM — J96.01 ACUTE HYPOXEMIC RESPIRATORY FAILURE: Status: ACTIVE | Noted: 2022-01-01

## 2022-01-27 PROBLEM — R65.20 SEVERE SEPSIS: Status: ACTIVE | Noted: 2022-01-01

## 2022-01-27 PROBLEM — R73.9 HYPERGLYCEMIA: Status: ACTIVE | Noted: 2022-01-01

## 2022-01-27 PROBLEM — R79.89 ELEVATED TROPONIN: Status: ACTIVE | Noted: 2022-01-01

## 2022-01-27 PROBLEM — I48.91 ATRIAL FIBRILLATION WITH RVR: Status: ACTIVE | Noted: 2022-01-01

## 2022-01-27 NOTE — TELEPHONE ENCOUNTER
----- Message from Genesis Gallardo sent at 1/26/2022 11:49 AM CST -----  Contact: Patient  Type: Patient Call Back         Who called: Kassy          What is the request in detail: I have Fabian Shine's wife Kassy calling for advice; she states he is experiencing burning and bladder spasms by catheter;  would like something called in for spasms; please advise           Best call back number: [phone]         Additional Information:       [facility] - [place], [place] - [address] [id number] [place] [address]  Phone: [phone] Fax: [phone]               Thank You

## 2022-01-28 PROBLEM — N39.0 COMPLICATED URINARY TRACT INFECTION: Status: ACTIVE | Noted: 2022-01-01

## 2022-01-29 PROBLEM — N20.1 URETEROLITHIASIS: Status: ACTIVE | Noted: 2022-01-01

## 2022-02-01 PROBLEM — N12 PYELONEPHRITIS OF RIGHT KIDNEY: Status: ACTIVE | Noted: 2022-01-01

## 2022-02-02 PROBLEM — E11.9 NEW ONSET TYPE 2 DIABETES MELLITUS: Status: ACTIVE | Noted: 2022-01-01

## 2022-02-04 PROBLEM — R53.81 PHYSICAL DECONDITIONING: Status: ACTIVE | Noted: 2022-01-01

## 2022-02-04 PROBLEM — B96.4 URINARY TRACT INFECTION DUE TO PROTEUS: Status: ACTIVE | Noted: 2022-01-01

## 2022-02-04 PROBLEM — B96.20 E-COLI UTI: Status: ACTIVE | Noted: 2022-01-01

## 2022-02-04 PROBLEM — N39.0 E-COLI UTI: Status: ACTIVE | Noted: 2022-01-01

## 2022-02-04 PROBLEM — E11.9 TYPE 2 DIABETES MELLITUS, WITHOUT LONG-TERM CURRENT USE OF INSULIN: Status: ACTIVE | Noted: 2022-01-01

## 2022-02-04 PROBLEM — N13.2 HYDRONEPHROSIS WITH URINARY OBSTRUCTION DUE TO URETERAL CALCULUS: Status: ACTIVE | Noted: 2022-01-01

## 2022-02-04 PROBLEM — N39.0 URINARY TRACT INFECTION DUE TO PROTEUS: Status: ACTIVE | Noted: 2022-01-01

## 2022-02-14 NOTE — TELEPHONE ENCOUNTER
I called and spoke to patients wife and she stated that he was in the hospital for a UTI and became septic.  He was in the bed for 11 days and now he's in Rehab in [place].  I informed her that he needs to have a CEA in 3 months, which will be in April.  She stated that the home health nurse will draw the CEA in April.  I informed her to let us know if she needs anything.  Ellis

## 2022-03-02 NOTE — TELEPHONE ENCOUNTER
Spoke with patient wife, she reports  is ready to have procedure for stent removal and procedure  For kidney stone , requesting for procedure to be two weeks out as patient is still in therapy. Nurse advised patient wife she will be in contact regarding procedure once Dr Mccall advises regarding procedure. Patient wife expressed understanding.

## 2022-03-02 NOTE — TELEPHONE ENCOUNTER
When he is ready, schedule right-sided shockwave lithotripsy.  Get KUB before procedure.  He will need to hold Xarelto for several days before the procedure.  Given the patient's medical comorbidities, this may need to be done at [facility]

## 2022-03-03 NOTE — TELEPHONE ENCOUNTER
Spoke with patient wife, Rt ESWL scheduled for 3/29/2022 @[facility]. Pre op appointment on 3/25/2022 @10:00am @ [facility]. Confirmed with [facility] #[id number]. Patient expressed understanding. Clearance sent to Dr Arenas in regards to Xarelto and patient clearance for surgery.

## 2022-03-03 NOTE — TELEPHONE ENCOUNTER
Patient 89 years old with chronic atrial fib and moderate aortic stenosis along with numerous medical problems as well as deconditioning from prolonged bed rest after bout of urosepsis. There are no absolute contraindications to planned surgery from cardiac standpoint but because of above conditions noted above increase risk for surgery. Would hold Xarelto for 72 hours and at time of surgery because of AS and atrial fib careful fluid monitoring should be done. Consider general medical evaluation and input also.

## 2022-03-07 NOTE — TELEPHONE ENCOUNTER
----- Message from Nereyda Reilly sent at 3/7/2022  1:30 PM CST -----  Regarding: advise  Contact: Rebecca with [facility]  Type: Needs Medical Advice  Who Called:  Rebecca with [facility]  Symptoms (please be specific):  NA  How long has patient had these symptoms:  SHAHZAD  Pharmacy name and phone #:  SHAHZAD  Best Call Back Number: [phone]  Additional Information: patient is having redness, swelling, itching and discharge around the head of his penis since Friday. Please call patient to advise.Thanks!

## 2022-03-09 PROBLEM — E08.618: Status: ACTIVE | Noted: 2022-01-01

## 2022-03-09 PROBLEM — Z79.4: Status: ACTIVE | Noted: 2022-01-01

## 2022-03-16 NOTE — PROGRESS NOTES
Patient unable to get out of wheelchair to either stand or move onto a table.  Image taken in sitting position.

## 2022-03-31 NOTE — TELEPHONE ENCOUNTER
Spoke with patient wife, patient surgery rescheduled for 4/12/2022 @ [facility]. Confirmed with Pilar @ [facility] . Patient wife expressed understanding.

## 2022-04-01 NOTE — TELEPHONE ENCOUNTER
----- Message from Arcadio Pa sent at 4/1/2022 11:17 AM CDT -----  Contact: Dl MIRANDA  Type: Needs Medical Advice  Who Called:  Dl MIRANDA  Best Call Back Number: [phone]  Additional Information: States pt's wife called to report hemauria. He just finished 3 doses gentamycin. He is on 2nd week of macrodantin. Kiran was changed out Wednesday.

## 2022-04-01 NOTE — TELEPHONE ENCOUNTER
Spoke with Rebecca  nurse for patient, with same message as from this mornig regarding blood in urine ,  nurse states patient wife wanted her to call regarding patient, advised  nurse Dr Mccall not in clinic this morning, but message has been forwarded to him as he will be in office this afternoon.  nurse Rebecca expressed understanding.

## 2022-04-28 NOTE — PROGRESS NOTES
Subjective:       Patient ID: Fabian Shine is a 89 y.o. male.    Chief Complaint: Other (Post op ESWL)    HPI     89-year-old with an obstructing right UPJ stone.  He underwent shockwave lithotripsy and previous stent placement.  He is here for follow-up.  He has passed lot of stone fragments in his catheter bag.  KUB today is reviewed and the UPJ stone is no longer seen.  There does appear to be a fragment along the distal course of the right ureteral stent.  He is pain-free today.    Review of Systems   Constitutional: Negative for fever.   Genitourinary: Negative for dysuria and hematuria.       Objective:      Physical Exam  Vitals reviewed.   Constitutional:       Appearance: He is well-developed.   Pulmonary:      Effort: Pulmonary effort is normal.   Skin:     Findings: No rash.   Neurological:      Mental Status: He is alert and oriented to person, place, and time.         Procedure  Cystoscopy with Stent removal    The patient was placed supine on the procedure table.  The genitals were prepped and draped sterily.   Viscous lidocaine jelly was instilled into the urethra.  Using a flexible scope, a careful cystoscopic, exam was then performed.  The entire bladder mucosa was systematically visualized.  There was mucosal edema near the right UO.   The right ureteral orifice was visualized and the distal curl of the stent was seen.  The grasping forceps were placed through the working channel of the scope.  The stent was grasped and removed intact.  He tolerated the procedure well.  There were no complications    Assessment:       1. Ureterolithiasis        Plan:       Ureterolithiasis      keep follow-up as planned in 3 months for cystoscopy for bladder cancer surveillance

## 2022-05-07 PROBLEM — R53.1 GENERALIZED WEAKNESS: Status: ACTIVE | Noted: 2022-01-01

## 2022-05-07 PROBLEM — J10.1 INFLUENZA A: Status: ACTIVE | Noted: 2022-01-01

## 2022-05-10 NOTE — PROGRESS NOTES
05/10/22-Attempt assessment with  Patient/caregiver  for outpatient case management. No answer. Left message requesting call back. RN OPCM 1'st assessment attempt.Letter thru [facility] portal.  05/12/22-Called and spoke to Ms Elena who declines OPCM at this time. She has RN contact info. Case Closure.

## 2022-05-31 NOTE — TELEPHONE ENCOUNTER
----- Message from Luma Giraldo sent at 5/31/2022  8:25 AM CDT -----  Contact: Wife Kassy  Patients wife Kassy is calling to schedule patients NP appt with Dr. Strong, but because of his mobility issues she is trying to see if they can schedule virtual if possible. Please call patients wife back to discuss at [phone]. Referral in system. Thank You.

## 2022-06-02 NOTE — PROGRESS NOTES
"The patient location is: Home  The chief complaint leading to consultation is: UTI    Visit type: audiovisual    Face to Face time with patient: 18  45 minutes of total time spent on the encounter, which includes face to face time and non-face to face time preparing to see the patient (eg, review of tests), Obtaining and/or reviewing separately obtained history, Documenting clinical information in the electronic or other health record, Independently interpreting results (not separately reported) and communicating results to the patient/family/caregiver, or Care coordination (not separately reported).         Each patient to whom he or she provides medical services by telemedicine is:  (1) informed of the relationship between the physician and patient and the respective role of any other health care provider with respect to management of the patient; and (2) notified that he or she may decline to receive medical services by telemedicine and may withdraw from such care at any time.    Notes:       Patient ID: Fabian Shine is a 89 y.o. male.    Subjective:         Chief Complaint: Urinary Tract Infection    HPI      This is a 89 y/o male with PMH: Colostomy 4 years due to colon cancer. 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   - Urine obtained on 5/16 when lott was exchanged. Grew Enterococcus and Proteus, treated with Cipro and Amoxicillin.  - Patient was refrrred to this clinic due to "recurrent UTI"   - Today patient is asymptomatic      Past Medical History:   Diagnosis Date    Anticoagulant long-term use     Bladder cancer 12/2021    Colon cancer 05/2018    Coronary artery disease     afib    General anesthetics causing adverse effect in therapeutic use     hallucinations for several days       Past Surgical History:   Procedure Laterality Date    ANGIOPLASTY Left     LEG    APPENDECTOMY      BLADDER TUMOR EXCISION      x 2    CATARACT EXTRACTION W/  INTRAOCULAR LENS IMPLANT " Left 10/31/2018    Procedure: phaco/pciol;  Surgeon: Paola Post MD;  Location: [facility] OR;  Service: Ophthalmology;  Laterality: Left;  topical; will need 10% Phenylephrine, Visitec I-Ring; please use Omidria if available.OMIDRIA ADDED TO   500   ML BSS PLUS    CATARACT EXTRACTION W/  INTRAOCULAR LENS IMPLANT Right     COLONOSCOPY N/A 4/17/2018    Procedure: COLONOSCOPY;  Surgeon: Carlos Redmond MD;  Location: [facility];  Service: Endoscopy;  Laterality: N/A;    COLONOSCOPY N/A 5/21/2019    Procedure: COLONOSCOPY;  Surgeon: Carlos Redmond MD;  Location: [facility] ENDO;  Service: Endoscopy;  Laterality: N/A;    COLONOSCOPY N/A 9/9/2020    Procedure: COLONOSCOPY;  Surgeon: Carlso Redmond MD;  Location: [facility];  Service: Endoscopy;  Laterality: N/A;    COLOSTOMY N/A 5/28/2018    Procedure: COLOSTOMY;  Surgeon: Willis Huang MD;  Location: [facility] OR;  Service: Colon and Rectal;  Laterality: N/A;    CYSTOSCOPY WITH BIOPSY OF BLADDER N/A 12/21/2021    Procedure: CYSTOSCOPY, WITH BLADDER BIOPSY, WITH FULGURATION IF INDICATED;  Surgeon: WISAM Mccall MD;  Location: [facility];  Service: Urology;  Laterality: N/A;  No BIOPSY    CYSTOSCOPY WITH URETEROSCOPY, RETROGRADE PYELOGRAPHY, AND INSERTION OF STENT Right 1/31/2022    Procedure: CYSTOSCOPY, WITH RETROGRADE PYELOGRAM AND RIGHT URETERAL STENT INSERTION;  Surgeon: WISAM Mccall MD;  Location: [facility] OR;  Service: Urology;  Laterality: Right;    EXTRACORPOREAL SHOCK WAVE LITHOTRIPSY Right 4/12/2022    Procedure: LITHOTRIPSY, ESWL;  Surgeon: WISAM Mccall MD;  Location: [facility] OR;  Service: Urology;  Laterality: Right;    FLEXIBLE SIGMOIDOSCOPY N/A 7/7/2021    Procedure: SIGMOIDOSCOPY, FLEXIBLE;  Surgeon: Willis Huang MD;  Location: [facility] ENDO;  Service: General;  Laterality: N/A;    HERNIA REPAIR Left     inginual hernia    PHACOEMULSIFICATION OF CATARACT Right 9/5/2018    Procedure: PHACOEMULSIFICATION, CATARACT;  Surgeon:  "Paola Post MD;  Location: [facility] OR;  Service: Ophthalmology;  Laterality: Right;  topical; will need 10% phenylephrine for dilation. Keep sedation light due to restless legs    tibial stent Right     2 stents to right leg    TONSILLECTOMY      TUMOR REMOVAL      BLADDER       Review of patient's allergies indicates:   Allergen Reactions    Bactrim [sulfamethoxazole-trimethoprim] Rash     Rash on chest and back       Family History   Problem Relation Age of Onset    Heart disease Father        Social History     Socioeconomic History    Marital status:    Tobacco Use    Smoking status: Never Smoker    Smokeless tobacco: Never Used   Substance and Sexual Activity    Alcohol use: Not Currently     Alcohol/week: 0.0 standard drinks     Comment: RARELY    Drug use: No       Current Outpatient Medications   Medication Instructions    ALPRAZolam (XANAX) 0.25 mg, Oral, Daily PRN    BD GARIMA 2ND GEN PEN NEEDLE 32 gauge x 5/32" Ndle No dose, route, or frequency recorded.    bisoprolol (ZEBETA) 5 mg, Oral, Daily    blood sugar diagnostic Strp 1 strip, Misc.(Non-Drug; Combo Route), 2 times daily    blood-glucose meter kit To check BG 2 times daily and for hypo/hyperglycemia, to use with insurance preferred meter. Diagnosis Code: E11.65    CHLO HIST 1-12.5 mg/5 mL Soln 5 mLs, Oral, Every 6 hours PRN    clotrimazole-betamethasone 1-0.05% (LOTRISONE) cream Topical (Top), 2 times daily    digoxin (LANOXIN) 250 mcg, Oral, Daily    digoxin (LANOXIN) 250 mcg, Oral, Every Saturday    diltiaZEM (CARDIZEM CD) 240 mg, Oral, Daily    famotidine (PEPCID) 20 mg, Oral, Daily    finasteride (PROSCAR) 5 mg, Oral, Daily    FLOMAX 0.4 mg Cap TAKE 1 CAPSULE ONCE DAILY.    glucos sul 2KCl/msm/chond/C/Mn (GLUCOSAMINE CHONDROITIN ORAL) 2 tablets, Oral, Nightly    HYDROcodone-acetaminophen (NORCO) 5-325 mg per tablet 1 tablet, Oral, Every 6 hours PRN    insulin aspart, niacinamide, (FIASP FLEXTOUCH U-100 " INSULIN) 100 unit/mL (3 mL) InPn Use with evening meal according to sliding scale.    iron polysaccharides (NIFEREX) 150 mg, Oral, Every Mon, Wed, Fri    KRILL OIL ORAL 1 capsule, Oral, Daily    Lactobacillus rhamnosus GG (CULTURELLE) 10 billion cell capsule 1 capsule, Oral, Daily    lancets Misc To check BG 2 times daily and for hypo/hyperglycemia, to use with insurance preferred meter. Diagnosis Code: E11.65    LEVEMIR FLEXTOUCH U-100 INSULN 100 unit/mL (3 mL) InPn pen INJECT 18 UNITS UNDER THE SKIN EVERY EVENING    megestroL (MEGACE) 40 mg, Oral, 2 times daily    menthol/zinc oxide (REMEDY CALAZIME INTENSIVE SKIN TOP) 1 application, Topical (Top), 2 times daily    METANX, ALGAL OIL, 3 mg-35 mg-2 mg -90.314 mg Cap TAKE 1 CAPSULE BY MOUTH ONCE DAILY    multivitamin with minerals tablet 1 tablet, Oral, Daily    pentoxifylline (TRENTAL) 400 mg TbSR TAKE 1 TABLET BY MOUTH 3  TIMES DAILY WITH MEALS    pravastatin (PRAVACHOL) 10 mg, Oral, Daily    rivaroxaban (XARELTO) 15 mg, Oral, Daily    rOPINIRole (REQUIP) 0.5 MG tablet TAKE 1 TABLET BY MOUTH WITH EVENING MEAL AND 1 TABLET  AT BEDTIME    traZODone (DESYREL) 50 mg, Oral, Nightly         Review of Systems   Constitutional: Negative for activity change, chills, diaphoresis, fatigue, fever and unexpected weight change.   HENT: Negative for sore throat.    Eyes: Negative for photophobia and visual disturbance.   Respiratory: Negative for cough and shortness of breath.    Cardiovascular: Negative for chest pain and leg swelling.   Gastrointestinal: Negative for abdominal distention, abdominal pain, nausea and vomiting.   Genitourinary: Negative for difficulty urinating, dysuria and flank pain.   Musculoskeletal: Negative for arthralgias.   Skin: Negative for color change and rash.   Allergic/Immunologic: Negative for immunocompromised state.   Neurological: Negative for dizziness and headaches.   Psychiatric/Behavioral: The patient is not nervous/anxious.             Objective:     There were no vitals filed for this visit.    There is no height or weight on file to calculate BMI.         Physical Exam  Neurological:      General: No focal deficit present.      Mental Status: He is alert and oriented to person, place, and time.   Psychiatric:         Mood and Affect: Mood normal.           Assessment:           Fabian was seen today for urinary tract infection.    Diagnoses and all orders for this visit:    UTI (urinary tract infection) due to Enterococcus  -     Ambulatory referral/consult to Infectious Disease    Frequent UTI  -     Ambulatory referral/consult to Infectious Disease         Plan:     - Patient with a colostomy and a chronic indwelling lott. Expected to have UTI from time to time and there is nothing ID can do to prevent these other than good hydration and perhaps cranberry extract.   - Extensive advise regarding signs and symptoms of UTI and proper urine collection were given. Advised that abnormal urine color does not necessarily mean infection and to be vigilant on other symptoms such as fatigue, lethargy, abd pain and fevers when urine changes.   - No need for abxs today  - Patient is to continue to follow with his PCP  - All questions answered    Epic wizard timing used for this encounter     Greater than 45 minutes was spent on this encounter, which included: review of recent encounters, review and interpretation of labs/images, obtaining pertinent history, performing a physical examination, counseling and educating the patient/family/caregiver, ordering medications/tests, documenting in the electronic health record, and coordinating care with necessary providers.    Brendan Strong MD  Infectious Diseases

## 2022-06-02 NOTE — TELEPHONE ENCOUNTER
----- Message from Ellis Polo MA sent at 6/1/2022 10:45 AM CDT -----  Regarding: PET CT  Awaiting an answer back from Nora Mae to schedule patient for a PET CT after 6/8/22.

## 2022-06-02 NOTE — TELEPHONE ENCOUNTER
I called and spoke to Mrs. Shine to inform her that the PET CT is scheduled on 6/17/22 @ 9am at [facility].  Patient is to fast for 6hrs prior to the test.  Ellis

## 2022-06-13 PROBLEM — Z00.00 ANNUAL PHYSICAL EXAM: Status: RESOLVED | Noted: 2021-09-16 | Resolved: 2022-01-01

## 2022-06-17 NOTE — PROGRESS NOTES
PET Imaging Questionnaire    1. Are you a Diabetic? Recent Blood Sugar level? Yes    2. Are you anemic? Bone Marrow Stimulation Meds? Yes    3. Have you had a CT Scan, if so when & where was your last one? Yes -     4. Have you had a PET Scan, if so when & where was your last one? No    5. Chemotherapy or currently on Chemotherapy? No    6. Radiation therapy? No    Surgical History:   Past Surgical History:   Procedure Laterality Date    ANGIOPLASTY Left     LEG    APPENDECTOMY      BLADDER TUMOR EXCISION      x 2    CATARACT EXTRACTION W/  INTRAOCULAR LENS IMPLANT Left 10/31/2018    Procedure: phaco/pciol;  Surgeon: Paola Post MD;  Location: [facility] OR;  Service: Ophthalmology;  Laterality: Left;  topical; will need 10% Phenylephrine, Visitec I-Ring; please use Omidria if available.OMIDRIA ADDED TO   500   ML BSS PLUS    CATARACT EXTRACTION W/  INTRAOCULAR LENS IMPLANT Right     COLONOSCOPY N/A 4/17/2018    Procedure: COLONOSCOPY;  Surgeon: Carlos Redmond MD;  Location: [facility];  Service: Endoscopy;  Laterality: N/A;    COLONOSCOPY N/A 5/21/2019    Procedure: COLONOSCOPY;  Surgeon: Carlos Redmond MD;  Location: [facility];  Service: Endoscopy;  Laterality: N/A;    COLONOSCOPY N/A 9/9/2020    Procedure: COLONOSCOPY;  Surgeon: Carlos Redmond MD;  Location: [facility];  Service: Endoscopy;  Laterality: N/A;    COLOSTOMY N/A 5/28/2018    Procedure: COLOSTOMY;  Surgeon: Willis Huagn MD;  Location: [facility];  Service: Colon and Rectal;  Laterality: N/A;    COLOSTOMY      CYSTOSCOPY WITH BIOPSY OF BLADDER N/A 12/21/2021    Procedure: CYSTOSCOPY, WITH BLADDER BIOPSY, WITH FULGURATION IF INDICATED;  Surgeon: WISAM Mccall MD;  Location: [facility];  Service: Urology;  Laterality: N/A;  No BIOPSY    CYSTOSCOPY WITH URETEROSCOPY, RETROGRADE PYELOGRAPHY, AND INSERTION OF STENT Right 1/31/2022    Procedure: CYSTOSCOPY, WITH RETROGRADE PYELOGRAM AND RIGHT URETERAL STENT INSERTION;   Surgeon: WISAM Mccall MD;  Location: [facility] OR;  Service: Urology;  Laterality: Right;    EXTRACORPOREAL SHOCK WAVE LITHOTRIPSY Right 4/12/2022    Procedure: LITHOTRIPSY, ESWL;  Surgeon: WISAM Mccall MD;  Location: [facility] OR;  Service: Urology;  Laterality: Right;    FLEXIBLE SIGMOIDOSCOPY N/A 7/7/2021    Procedure: SIGMOIDOSCOPY, FLEXIBLE;  Surgeon: Willis Huang MD;  Location: [facility];  Service: General;  Laterality: N/A;    HERNIA REPAIR Left     inginual hernia    PHACOEMULSIFICATION OF CATARACT Right 9/5/2018    Procedure: PHACOEMULSIFICATION, CATARACT;  Surgeon: Paola Post MD;  Location: [facility] OR;  Service: Ophthalmology;  Laterality: Right;  topical; will need 10% phenylephrine for dilation. Keep sedation light due to restless legs    tibial stent Right     2 stents to right leg    TONSILLECTOMY      TUMOR REMOVAL      BLADDER   7.      8. Have you been fasting for at least 6 hours? Yes    9. Is there any chance you may be pregnant or breastfeeding? No    Assay: 15.1 MCi@:8:41   Injection Site:RT Hand    Residual: 3.53 mCi@: 8:44   Technologist: Benedict Pinto Injected:11.57mCi

## 2022-06-23 NOTE — PROGRESS NOTES
89-year-old gentleman whom I have seen multiple times in the past.  Patient is status post ex lap with rectosigmoid resection of a stage 1 sigmoid colon cancer.  The surgery was performed in May of 2018. Last year patient began having increase in his CEA level.  This led to a colonoscopy with no significant findings.  Given increase to 13.3 in May of this year a PET scan was ordered.  He presents today to discuss findings of the PET scan.  Patient notes that he feels well.  Does have some fatigue and back pain.  No nausea vomiting and no changes in ostomy function.    AFVSS  AAOx3  Soft/nt/nd  Ostomy pink and patent    PET:   Findings suggestive of metastatic liver disease with lymphadenopathy within the hank hepatis.  There is also suspicion of a mat in the L1 vertebral body.      A/P:  History of colon cancer with findings suggestive of metastatic disease to the liver and hank hepatis    Discussion with patient.  He now has findings consistent with metastatic disease.  I have recommended follow-up with Oncology.  Given his age and overall physical condition do not know if he is a candidate for chemotherapy but will defer to Oncology for evaluation.

## 2022-06-23 NOTE — NURSING
Reached out to patient to schedule an oncology visit per referral from Dr. Huang. Spoke with wife and offered first available appointment with Dr. Patel; wife accepted.   Date time and location confirmed. Pt is scheduled to see Dr. Patel on June 27th at 1:00 pm.  PET results in from June 1st.     Oncology Navigation   Intake  Date of Diagnosis: 5/28/2018  Cancer Type: GI  Internal / External Referral: Internal  Referral Source: Dr. Huang  Date of Referral: 6/23/2022  Initial Nurse Navigator Contact: 6/23/2022  Referral to Initial Contact Timeline (days): 0  Date Worked: 6/23/2022  First Appointment Available: 6/27/2022  Appointment Date: 6/27/2022  First Available Date vs. Scheduled Date (days): 0  Multiple appointments: Yes     Treatment     Surgery: Completed  Surgical Oncologist: Dr. Huang  Type of Surgery: partial proctectomy with colectomy  Surgery Schedule Date: 5/28/2018    Medical Oncologist: Dr. Oumou Klein  Consult Date: 7/20/2018  Chemotherapy: N/A  Chemotherapy Not Applicable: patient did not want any treatment  Immunotherapy: N/A  Oral Therapy: N/A       Procedures: PET scan  PET Scan Schedule Date: 6/1/2022    General Referrals: Palliative care  Palliative Care: Nereyda Azar NP  Palliative Care Referral Date: 6/20/2022          Support Systems: Spouse/significant other     Acuity      Follow Up  No follow-ups on file.

## 2022-06-27 NOTE — PROGRESS NOTES
PATIENT: Fabian Shine  MRN: [id number]  DATE: 6/27/2022      Diagnosis:   1. Liver metastases    2. Bone metastases    3. 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 Elevated CEA    5. Anemia in neoplastic disease    6. Atrial fibrillation, unspecified type    7. Coronary artery disease, unspecified vessel or lesion type, unspecified whether angina present, unspecified whether native or transplanted heart    8. Type 2 diabetes mellitus without complication, with long-term current use of insulin    9. History of bladder cancer    10. History of colon cancer        Chief Complaint: Establish Care (Liver Metastases)    Subjective:    Initial History: Mr. Shine is a 89 y.o. male with A-fib, CAD, DMII, h/o bladder cancer, h/o colon cancer who presents with liver metastases.  The patient has been under the care of Dr Mccall for bladder cancer s/p prior resection and administration of intravesicular BCG.  Last cystoscopy done 12/21/21 had pathology showing no evidence of tumor but did show florid lymphoplasmacytic inflammation in atypical grandular epithelium consistent with severe cystitis.  The patient had prior undergone colonoscopy on 04/17/2018 with invasive adenocarcinoma seen on rectosigmoid biopsy.  The patient underwent partial colectomy on 05/28/2018 with formation of an ostomy with path showing adenocarcinoma measuring 3.3 cm in greatest dimension with invasion of the muscularis propria, grade 2, negative margins, no lymphovascular invasion, no perineural invasion, and 0/20 lymph nodes positive.  The patient was followed by Dr. Huang and found to have an elevated Ca of 13.3 ng/mL on 05/16/2022.  The patient underwent PET-CT on 06/17/2022 showing  increased tracer centrally within the hepatic parenchyma, bulky lymphadenopathy at the hank hepaticus surrounding the pancreatic head, increased tracer within the L1 vertebral body, and increased uptake in the periaortic and retroperitoneal  lymph nodes.  Currently the patient complains of decreased appetite, weight loss, fatigue, difficulty swallowing, occasional abdominal pain, difficulty urinating, lower back pain, anxiety, easy bruising.    Past Medical History:   Past Medical History:   Diagnosis Date    Anticoagulant long-term use     Atrial fibrillation     Bladder cancer 12/2021    Colon cancer 05/2018    Coronary artery disease     afib    Diabetes mellitus, type 2     General anesthetics causing adverse effect in therapeutic use     hallucinations for several days       Past Surgical HIstory:   Past Surgical History:   Procedure Laterality Date    ANGIOPLASTY Left     LEG    APPENDECTOMY      BLADDER TUMOR EXCISION      x 2    CATARACT EXTRACTION W/  INTRAOCULAR LENS IMPLANT Left 10/31/2018    Procedure: phaco/pciol;  Surgeon: Paola Post MD;  [facility];  Service: Ophthalmology;  Laterality: Left;  topical; will need 10% Phenylephrine, Visitec I-Ring; please use Omidria if available.OMIDRIA ADDED TO   500   ML BSS PLUS    CATARACT EXTRACTION W/  INTRAOCULAR LENS IMPLANT Right     COLONOSCOPY N/A 4/17/2018    Procedure: COLONOSCOPY;  Surgeon: Carlos Redmond MD;  Location: [facility];  Service: Endoscopy;  Laterality: N/A;    COLONOSCOPY N/A 5/21/2019    Procedure: COLONOSCOPY;  Surgeon: Carlos Redmond MD;  Location: [facility];  Service: Endoscopy;  Laterality: N/A;    COLONOSCOPY N/A 9/9/2020    Procedure: COLONOSCOPY;  Surgeon: Carlos Redmond MD;  Location: [facility];  Service: Endoscopy;  Laterality: N/A;    COLOSTOMY N/A 5/28/2018    Procedure: COLOSTOMY;  Surgeon: Willis Huang MD;  Location: [facility];  Service: Colon and Rectal;  Laterality: N/A;    COLOSTOMY      CYSTOSCOPY WITH BIOPSY OF BLADDER N/A 12/21/2021    Procedure: CYSTOSCOPY, WITH BLADDER BIOPSY, WITH FULGURATION IF INDICATED;  Surgeon: WISAM Mccall MD;  [facility];  Service: Urology;  Laterality: N/A;  No  "BIOPSY    CYSTOSCOPY WITH URETEROSCOPY, RETROGRADE PYELOGRAPHY, AND INSERTION OF STENT Right 1/31/2022    Procedure: CYSTOSCOPY, WITH RETROGRADE PYELOGRAM AND RIGHT URETERAL STENT INSERTION;  Surgeon: WISAM Mccall MD;  Location: [facility];  Service: Urology;  Laterality: Right;    EXTRACORPOREAL SHOCK WAVE LITHOTRIPSY Right 4/12/2022    Procedure: LITHOTRIPSY, ESWL;  Surgeon: WISAM Mccall MD;  Location: [facility] OR;  Service: Urology;  Laterality: Right;    FLEXIBLE SIGMOIDOSCOPY N/A 7/7/2021    Procedure: SIGMOIDOSCOPY, FLEXIBLE;  Surgeon: Willis Huang MD;  Location: [facility] ENDO;  Service: General;  Laterality: N/A;    HERNIA REPAIR Left     inginual hernia    PHACOEMULSIFICATION OF CATARACT Right 9/5/2018    Procedure: PHACOEMULSIFICATION, CATARACT;  Surgeon: Paola Pots MD;  Location: [facility] OR;  Service: Ophthalmology;  Laterality: Right;  topical; will need 10% phenylephrine for dilation. Keep sedation light due to restless legs    tibial stent Right     2 stents to right leg    TONSILLECTOMY      TUMOR REMOVAL      BLADDER       Family History:   Family History   Problem Relation Age of Onset    Heart disease Father        Social History:  reports that he has never smoked. He has never used smokeless tobacco. He reports previous alcohol use. He reports that he does not use drugs.    Allergies:  Review of patient's allergies indicates:   Allergen Reactions    Mirtazapine (bulk) Other (See Comments)     Pt becomes disorientated and very fatigued     Bactrim [sulfamethoxazole-trimethoprim] Rash     Rash on chest and back       Medications:  Current Outpatient Medications   Medication Sig Dispense Refill    ALPRAZolam (XANAX) 0.25 MG tablet Take 1 tablet (0.25 mg total) by mouth daily as needed for Insomnia or Anxiety (For insomnia or anxiety.). 30 tablet 1    BD GARIMA 2ND GEN PEN NEEDLE 32 gauge x 5/32" Ndle       bisoprolol (ZEBETA) 5 MG tablet Take 1 tablet (5 mg total) by mouth " once daily. 90 tablet 4    blood sugar diagnostic Strp 1 strip by Misc.(Non-Drug; Combo Route) route 2 (two) times a day. 180 strip 4    blood-glucose meter kit To check BG 2 times daily and for hypo/hyperglycemia, to use with insurance preferred meter. Diagnosis Code: E11.65 1 each 0    CHLO HIST 1-12.5 mg/5 mL Soln Take 5 mLs by mouth every 6 (six) hours as needed (congestion).      clotrimazole-betamethasone 1-0.05% (LOTRISONE) cream Apply topically 2 (two) times daily. 45 g 0    cranberry 400 mg Cap Take 400 mg by mouth once daily.      digoxin (LANOXIN) 250 mcg tablet Take 1 tablet (250 mcg total) by mouth once daily. (Patient taking differently: Take 250 mcg by mouth once daily at 6am. Does not take on Sunday) 90 tablet 4    digoxin (LANOXIN) 250 mcg tablet Take 250 mcg by mouth every Saturday.      diltiaZEM (CARDIZEM CD) 240 MG 24 hr capsule Take 1 capsule (240 mg total) by mouth once daily. 90 capsule 4    famotidine (PEPCID) 20 MG tablet Take 1 tablet (20 mg total) by mouth once daily. 30 tablet 0    finasteride (PROSCAR) 5 mg tablet Take 1 tablet (5 mg total) by mouth once daily. (Patient taking differently: Take 5 mg by mouth nightly.) 90 tablet 4    FLOMAX 0.4 mg Cap TAKE 1 CAPSULE ONCE DAILY. (Patient taking differently: Take 0.4 mg by mouth nightly.) 90 capsule 4    glucos sul 2KCl/msm/chond/C/Mn (GLUCOSAMINE CHONDROITIN ORAL) Take 2 tablets by mouth nightly.      HYDROcodone-acetaminophen (NORCO) 5-325 mg per tablet Take 1 tablet by mouth every 6 (six) hours as needed for Pain. 20 tablet 0    insulin aspart, niacinamide, (FIASP FLEXTOUCH U-100 INSULIN) 100 unit/mL (3 mL) InPn Use with evening meal according to sliding scale. (Patient taking differently: Inject 0-6 Units into the skin before meals as needed (high blood sugar). If bs is over 139mg/dl then give insulin per sliding scale as follows       = 0 units  140-180 = 3 units  181-240 = 4 units  241-300< = 6 units) 1 pen 3     iron polysaccharides (NIFEREX) 150 mg iron Cap Take 1 capsule (150 mg total) by mouth every Mon, Wed, Fri. 40 capsule 4    KRILL OIL ORAL Take 1 capsule by mouth once daily.      Lactobacillus rhamnosus GG (CULTURELLE) 10 billion cell capsule Take 1 capsule by mouth once daily.      lancets Misc To check BG 2 times daily and for hypo/hyperglycemia, to use with insurance preferred meter. Diagnosis Code: E11.65 60 each 0    LEVEMIR FLEXTOUCH U-100 INSULN 100 unit/mL (3 mL) InPn pen INJECT 18 UNITS UNDER THE SKIN EVERY EVENING (Patient taking differently: Inject 18 Units into the skin every evening.) 6 each 3    megestroL (MEGACE) 40 MG Tab Take 1 tablet (40 mg total) by mouth 2 (two) times daily. (Patient not taking: Reported on 6/27/2022) 60 tablet 2    menthol/zinc oxide (REMEDY CALAZIME INTENSIVE SKIN TOP) Apply 1 application topically 2 (two) times a day.      METANX, ALGAL OIL, 3 mg-35 mg-2 mg -90.314 mg Cap TAKE 1 CAPSULE BY MOUTH ONCE DAILY (Patient taking differently: Take 1 capsule by mouth once daily.) 90 capsule 4    multivitamin (THERAGRAN) per tablet Take 1 tablet by mouth once daily.      multivitamin with minerals tablet Take 1 tablet by mouth once daily.      pentoxifylline (TRENTAL) 400 mg TbSR TAKE 1 TABLET BY MOUTH 3  TIMES DAILY WITH MEALS (Patient taking differently: Take 400 mg by mouth 3 (three) times daily with meals. TAKE 1 TABLET BY MOUTH 3  TIMES DAILY WITH MEALS) 270 tablet 4    pravastatin (PRAVACHOL) 10 MG tablet Take 1 tablet (10 mg total) by mouth once daily. 90 tablet 4    psyllium 0.52 gram capsule Take 0.52 g by mouth once daily.      rivaroxaban (XARELTO) 15 mg Tab Take 1 tablet (15 mg total) by mouth once daily. (Patient taking differently: Take 15 mg by mouth every evening.) 90 tablet 4    rOPINIRole (REQUIP) 0.5 MG tablet TAKE 1 TABLET BY MOUTH WITH EVENING MEAL AND 1 TABLET  AT BEDTIME (Patient taking differently: Take 0.5 mg by mouth 2 (two) times a day. WITH  EVENING MEAL AND 1 TABLET  AT BEDTIME) 180 tablet 4    FLUoxetine 10 MG capsule Take 1 capsule (10 mg total) by mouth once daily. (Patient not taking: Reported on 6/27/2022) 90 capsule 0     No current facility-administered medications for this visit.       Review of Systems   Constitutional: Positive for appetite change, fatigue and unexpected weight change. Negative for chills and fever.   HENT: Positive for trouble swallowing (medicine). Negative for mouth sores and sore throat.    Eyes: Negative for photophobia and visual disturbance.   Respiratory: Negative for cough, chest tightness and shortness of breath.    Cardiovascular: Negative for chest pain, palpitations and leg swelling.   Gastrointestinal: Positive for abdominal pain. Negative for constipation, diarrhea, nausea and vomiting.   Endocrine: Negative for cold intolerance and heat intolerance.   Genitourinary: Positive for difficulty urinating. Negative for dysuria, frequency and hematuria.   Musculoskeletal: Positive for back pain. Negative for arthralgias and myalgias.   Skin: Negative for color change and rash.   Neurological: Negative for dizziness, light-headedness, numbness and headaches.   Hematological: Negative for adenopathy. Bruises/bleeds easily.   Psychiatric/Behavioral: The patient is nervous/anxious.        ECOG Performance Status: 3   Objective:      Vitals:   Vitals:    06/27/22 1308   BP: 110/60   BP Location: Right arm   Patient Position: Sitting   BP Method: Medium (Manual)   Pulse: 62   Temp: 97.6 °F (36.4 °C)   TempSrc: Temporal   SpO2: 99%   Weight: 78 kg (172 lb)   Height: 6' (1.829 m)       Physical Exam  Constitutional:       General: He is not in acute distress.     Appearance: He is well-developed. He is not diaphoretic.   HENT:      Head: Normocephalic and atraumatic.   Eyes:      General: No scleral icterus.        Right eye: No discharge.         Left eye: No discharge.   Cardiovascular:      Rate and Rhythm: Normal rate  and regular rhythm.      Heart sounds: Normal heart sounds. No murmur heard.    No friction rub. No gallop.   Pulmonary:      Effort: Pulmonary effort is normal. No respiratory distress.      Breath sounds: Normal breath sounds. No wheezing or rales.   Chest:      Chest wall: No tenderness.   Breasts:      Right: No axillary adenopathy or supraclavicular adenopathy.      Left: No axillary adenopathy or supraclavicular adenopathy.       Abdominal:      General: Bowel sounds are normal. There is no distension.      Palpations: Abdomen is soft. There is no mass.      Tenderness: There is no abdominal tenderness. There is no rebound.      Comments: Ostomy in LLQ   Musculoskeletal:         General: No tenderness. Normal range of motion.      Right lower leg: Edema present.      Left lower leg: Edema present.   Lymphadenopathy:      Cervical: No cervical adenopathy.      Upper Body:      Right upper body: No supraclavicular or axillary adenopathy.      Left upper body: No supraclavicular or axillary adenopathy.   Skin:     General: Skin is warm and dry.      Findings: No erythema or rash.   Neurological:      Mental Status: He is alert and oriented to person, place, and time.      Coordination: Coordination normal.   Psychiatric:         Behavior: Behavior normal.         Laboratory Data:  Lab Visit on 06/27/2022   Component Date Value Ref Range Status    WBC 06/27/2022 10.57  3.90 - 12.70 K/uL Final    RBC 06/27/2022 4.49 (A) 4.60 - 6.20 M/uL Final    Hemoglobin 06/27/2022 14.0  14.0 - 18.0 g/dL Final    Hematocrit 06/27/2022 43.2  40.0 - 54.0 % Final    MCV 06/27/2022 96  82 - 98 fL Final    MCH 06/27/2022 31.2 (A) 27.0 - 31.0 pg Final    MCHC 06/27/2022 32.4  32.0 - 36.0 g/dL Final    RDW 06/27/2022 14.7 (A) 11.5 - 14.5 % Final    Platelets 06/27/2022 207  150 - 450 K/uL Final    MPV 06/27/2022 9.0 (A) 9.2 - 12.9 fL Final    Immature Granulocytes 06/27/2022 0.3  0.0 - 0.5 % Final    Gran # (ANC) 06/27/2022  6.5  1.8 - 7.7 K/uL Final    Immature Grans (Abs) 06/27/2022 0.03  0.00 - 0.04 K/uL Final    Comment: Mild elevation in immature granulocytes is non specific and   can be seen in a variety of conditions including stress response,   acute inflammation, trauma and pregnancy. Correlation with other   laboratory and clinical findings is essential.      Lymph # 06/27/2022 2.6  1.0 - 4.8 K/uL Final    Mono # 06/27/2022 1.2 (A) 0.3 - 1.0 K/uL Final    Eos # 06/27/2022 0.3  0.0 - 0.5 K/uL Final    Baso # 06/27/2022 0.03  0.00 - 0.20 K/uL Final    nRBC 06/27/2022 0  0 /100 WBC Final    Gran % 06/27/2022 61.5  38.0 - 73.0 % Final    Lymph % 06/27/2022 24.5  18.0 - 48.0 % Final    Mono % 06/27/2022 11.0  4.0 - 15.0 % Final    Eosinophil % 06/27/2022 2.4  0.0 - 8.0 % Final    Basophil % 06/27/2022 0.3  0.0 - 1.9 % Final    Differential Method 06/27/2022 Automated   Final    Sodium 06/27/2022 138  136 - 145 mmol/L Final    Potassium 06/27/2022 4.6  3.5 - 5.1 mmol/L Final    Chloride 06/27/2022 105  95 - 110 mmol/L Final    CO2 06/27/2022 21 (A) 23 - 29 mmol/L Final    Glucose 06/27/2022 185 (A) 70 - 110 mg/dL Final    BUN 06/27/2022 24 (A) 8 - 23 mg/dL Final    Creatinine 06/27/2022 1.0  0.5 - 1.4 mg/dL Final    Calcium 06/27/2022 9.7  8.7 - 10.5 mg/dL Final    Total Protein 06/27/2022 7.2  6.0 - 8.4 g/dL Final    Albumin 06/27/2022 3.4 (A) 3.5 - 5.2 g/dL Final    Total Bilirubin 06/27/2022 0.6  0.1 - 1.0 mg/dL Final    Comment: For infants and newborns, interpretation of results should be based  on gestational age, weight and in agreement with clinical  observations.    Premature Infant recommended reference ranges:  Up to 24 hours.............<8.0 mg/dL  Up to 48 hours............<12.0 mg/dL  3-5 days..................<15.0 mg/dL  6-29 days.................<15.0 mg/dL      Alkaline Phosphatase 06/27/2022 58  55 - 135 U/L Final    AST 06/27/2022 20  10 - 40 U/L Final    ALT 06/27/2022 15  10 - 44 U/L  Final    Anion Gap 06/27/2022 12  8 - 16 mmol/L Final    eGFR if African American 06/27/2022 >60  >60 mL/min/1.73 m^2 Final    eGFR if non African American 06/27/2022 >60  >60 mL/min/1.73 m^2 Final    Comment: Calculation used to obtain the estimated glomerular filtration  rate (eGFR) is the CKD-EPI equation.            Imaging: PET/CT 6/17/22    Quality of the study: Adequate.     There is increased tracer uptake seen centrally within the hepatic parenchyma showing max SUV of 11.  Transmission CT images show an ill-defined low-attenuation lesion in this area measuring approximately 6.5 cm in greatest transaxial dimension.     There is bulky lymphadenopathy seen at the hank hepatis and surrounding the pancreatic head with the max SUV of 10 and largest node measuring is 2.1 cm in short axis.     There is increased tracer uptake within the L1 vertebral body showing max SUV of 6.1.  No discrete lesion is seen in this region on transmission CT images.     There is increased tracer uptake within a periaortic, retroperitoneal node showing max SUV of 9.7.  This measures 1.1 cm in short axis.     Physiologic uptake of the tracer is present within the brain, salivary glands, myocardium, GI and  tracts.     Incidental CT findings: N/A       Assessment:       1. Liver metastases    2. Bone metastases    3. 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 Elevated CEA    5. Anemia in neoplastic disease    6. Atrial fibrillation, unspecified type    7. Coronary artery disease, unspecified vessel or lesion type, unspecified whether angina present, unspecified whether native or transplanted heart    8. Type 2 diabetes mellitus without complication, with long-term current use of insulin    9. History of bladder cancer    10. History of colon cancer           Plan:     Liver Metastases - The patient underwent a PET/CT after being found to have increasing CEA on 5/16/22  -PET/CT 6/17/22 showed lesion in the  hepatic parenchyma, bulky lymphadenopathy at the hank hepaticus surrounding the pancreatic head, increased tracer within the L1 vertebral body, and increased uptake in the periaortic and retroperitoneal lymph nodes   -Pt to potentially undergo liver biopsy with IR    Anemia - will check iron studies  -Hemoglobin was 12.7 on 5/07/22  -wIll monitor    A-fib - pt on digoxin, diltiazem, and xarelto  -MAnagement per cardiology    CAD - Xarelto, statin, bisoprolol   -Management per cardiology    DMII - pt on insulin  -Last A1C was 7.5 on 1/27/22  -Stable  -Management per PCP    History of Bladder Cancer - pt monitered by Dr Mccall  -Plan for repeat cystoscopy next week  -Pt with indwelling urinary catheter  -Management per Urology    History of Colon Cancer - pt is s/p hemicolectomy for rectal cancer 5/28/18  -Given rising CEA concern is for metastatic disease  -Will f/u results of liver biopsy    Route Chart for Scheduling    Med Onc Chart Routing      Follow up with physician Other. The patient needs a CBC, CMP ferritin, and iron/TIBC today.  He needs to be scheduled for a liver biopsy with IR with a return visit with me a week after it is completed.   Follow up with ZACHARIAH    Infusion scheduling note    Injection scheduling note    Labs    Imaging    Pharmacy appointment    Other referrals               John Patel MD  [facility]  Hematology and Oncology  [facility]   [address] [place] [address]   O: [phone]  F: [phone]

## 2022-07-19 NOTE — PROGRESS NOTES
07/19/22 Attempt assessment with patient/caregiver.caregiver requests call back on Thursday in the AM.  RN OPCM first assessment attempt.   07/21/22 Attempt assessment with patient/caregiver. Caregiver declined OPCM at this time. Has all needs met. Will mail RN info and OPCM brochure. RN OPCM case closure.

## 2022-07-19 NOTE — TELEPHONE ENCOUNTER
I called and spoke with Mrs. Mora HEARN In the [facility] pathology department in regards to patient's liver biopsy on 7/12/2022. Mora HEARN Confirmed that Dr. Arcadio Holman would have the results by the end of today. I voiced understanding.

## 2022-07-20 PROBLEM — C22.0 CANCER, HEPATOCELLULAR: Status: ACTIVE | Noted: 2022-01-01

## 2022-07-21 NOTE — NURSING
Spoke with wife to schedule and confirm appointments.  Patient will have labs and education on 7/26, then clinic visit and treatment on 7/28.  Wife verbalized understanding of date, time and location.  No other questions.

## 2022-07-25 NOTE — PROGRESS NOTES
PATIENT: Fabian Shine  MRN: [id number]  DATE: 7/26/2022    Diagnosis:   1. Cancer, hepatocellular        Chief Complaint: Patient Education (Chemo school )    Subjective:   HPI: Mr. Shine is a 89 y.o. male with A-fib, CAD, DMII, h/o bladder cancer, h/o colon cancer who presents for education prior to starting Keytruda 200 mg IV every 21 days for treatment of metastatic hepatocellular cancer. He is accompanied by his wife, Kassy today.     Patient is in wheelchair for appointment today. Denies any specific concerns or symptoms at this time.   Has home health nurse come to the home once weekly and requests labs for treatment be drawn at home health visit to limit appointments.   Denies HA, CP, SOB, diarrhea, constipation, N/V, swelling. No fevers, chills.     Prior History:   The patient has been under the care of Dr Mccall for bladder cancer s/p prior resection and administration of intravesicular BCG.  Last cystoscopy done 12/21/21 had pathology showing no evidence of tumor but did show florid lymphoplasmacytic inflammation in atypical grandular epithelium consistent with severe cystitis.  The patient had prior undergone colonoscopy on 04/17/2018 with invasive adenocarcinoma seen on rectosigmoid biopsy.  The patient underwent partial colectomy on 05/28/2018 with formation of an ostomy with path showing adenocarcinoma measuring 3.3 cm in greatest dimension with invasion of the muscularis propria, grade 2, negative margins, no lymphovascular invasion, no perineural invasion, and 0/20 lymph nodes positive.    5/16/2022 The patient was followed by Dr. Huang and found to have an elevated Ca of 13.3 ng/mL   06/17/2022 PET/CT showing  increased tracer centrally within the hepatic parenchyma, bulky lymphadenopathy at the hank hepaticus surrounding the pancreatic head, increased tracer within the L1 vertebral body, and increased uptake in the periaortic and retroperitoneal lymph nodes.  7/12/22 IR biopsy of the liver  with results showing carcinoma with morphologic features consistent of adenocarcinoma and hepatocellular carcinoma.       Past Medical History:   Past Medical History:   Diagnosis Date    Anticoagulant long-term use     Atrial fibrillation     Bladder cancer 12/2021    Colon cancer 05/2018    Coronary artery disease     afib    Diabetes mellitus, type 2     General anesthetics causing adverse effect in therapeutic use     hallucinations for several days       Past Surgical HIstory:   Past Surgical History:   Procedure Laterality Date    ANGIOPLASTY Left     LEG    APPENDECTOMY      BLADDER TUMOR EXCISION      x 2    CATARACT EXTRACTION W/  INTRAOCULAR LENS IMPLANT Left 10/31/2018    Procedure: phaco/pciol;  Surgeon: Paola Post MD;  Location: [facility] OR;  Service: Ophthalmology;  Laterality: Left;  topical; will need 10% Phenylephrine, Visitec I-Ring; please use Omidria if available.OMIDRIA ADDED TO   500   ML BSS PLUS    CATARACT EXTRACTION W/  INTRAOCULAR LENS IMPLANT Right     COLONOSCOPY N/A 4/17/2018    Procedure: COLONOSCOPY;  Surgeon: Carlos Redmond MD;  Location: [facility];  Service: Endoscopy;  Laterality: N/A;    COLONOSCOPY N/A 5/21/2019    Procedure: COLONOSCOPY;  Surgeon: Carlos Redmond MD;  Location: [facility];  Service: Endoscopy;  Laterality: N/A;    COLONOSCOPY N/A 9/9/2020    Procedure: COLONOSCOPY;  Surgeon: Carlos Redmond MD;  Location: [facility];  Service: Endoscopy;  Laterality: N/A;    COLOSTOMY N/A 5/28/2018    Procedure: COLOSTOMY;  Surgeon: Willis Huang MD;  Location: [facility];  Service: Colon and Rectal;  Laterality: N/A;    COLOSTOMY      CYSTOSCOPY WITH BIOPSY OF BLADDER N/A 12/21/2021    Procedure: CYSTOSCOPY, WITH BLADDER BIOPSY, WITH FULGURATION IF INDICATED;  Surgeon: WISAM Mccall MD;  Location: [facility];  Service: Urology;  Laterality: N/A;  No BIOPSY    CYSTOSCOPY WITH URETEROSCOPY, RETROGRADE PYELOGRAPHY, AND INSERTION OF  "STENT Right 1/31/2022    Procedure: CYSTOSCOPY, WITH RETROGRADE PYELOGRAM AND RIGHT URETERAL STENT INSERTION;  Surgeon: WISAM Mccall MD;  Location: [facility] OR;  Service: Urology;  Laterality: Right;    EXTRACORPOREAL SHOCK WAVE LITHOTRIPSY Right 4/12/2022    Procedure: LITHOTRIPSY, ESWL;  Surgeon: WISAM Mccall MD;  Location: [facility] OR;  Service: Urology;  Laterality: Right;    FLEXIBLE SIGMOIDOSCOPY N/A 7/7/2021    Procedure: SIGMOIDOSCOPY, FLEXIBLE;  Surgeon: Willis Huang MD;  Location: [facility] ENDO;  Service: General;  Laterality: N/A;    HERNIA REPAIR Left     inginual hernia    PHACOEMULSIFICATION OF CATARACT Right 9/5/2018    Procedure: PHACOEMULSIFICATION, CATARACT;  Surgeon: Paola Post MD;  [facility];  Service: Ophthalmology;  Laterality: Right;  topical; will need 10% phenylephrine for dilation. Keep sedation light due to restless legs    tibial stent Right     2 stents to right leg    TONSILLECTOMY      TUMOR REMOVAL      BLADDER       Family History:   Family History   Problem Relation Age of Onset    Heart disease Father        Social History:  reports that he has never smoked. He has never used smokeless tobacco. He reports previous alcohol use. He reports that he does not use drugs.    Allergies:  Review of patient's allergies indicates:   Allergen Reactions    Mirtazapine (bulk) Other (See Comments)     Pt becomes disorientated and very fatigued     Bactrim [sulfamethoxazole-trimethoprim] Rash     Rash on chest and back       Medications:  Current Outpatient Medications   Medication Sig Dispense Refill    ALPRAZolam (XANAX) 0.25 MG tablet Take 1 tablet (0.25 mg total) by mouth daily as needed for Insomnia or Anxiety (For insomnia or anxiety.). 30 tablet 1    BD GARIMA 2ND GEN PEN NEEDLE 32 gauge x 5/32" Ndle       bisoprolol (ZEBETA) 5 MG tablet Take 1 tablet (5 mg total) by mouth once daily. 90 tablet 4    blood sugar diagnostic Strp 1 strip by Misc.(Non-Drug; " Combo Route) route 2 (two) times a day. 180 strip 4    blood-glucose meter kit To check BG 2 times daily and for hypo/hyperglycemia, to use with insurance preferred meter. Diagnosis Code: E11.65 1 each 0    clotrimazole-betamethasone 1-0.05% (LOTRISONE) cream Apply topically 2 (two) times daily. 45 g 0    cranberry 400 mg Cap Take 400 mg by mouth once daily.      digoxin (LANOXIN) 250 mcg tablet Take 1 tablet (250 mcg total) by mouth once daily. (Patient taking differently: Take 250 mcg by mouth once daily at 6am. Does not take on Sunday) 90 tablet 4    diltiaZEM (CARDIZEM CD) 240 MG 24 hr capsule Take 1 capsule (240 mg total) by mouth once daily. 90 capsule 4    famotidine (PEPCID) 20 MG tablet TAKE 1 TABLET BY MOUTH ONCE DAILY 30 tablet 2    finasteride (PROSCAR) 5 mg tablet Take 1 tablet (5 mg total) by mouth once daily. (Patient taking differently: Take 5 mg by mouth nightly.) 90 tablet 4    FLOMAX 0.4 mg Cap TAKE 1 CAPSULE ONCE DAILY. (Patient taking differently: Take 0.4 mg by mouth nightly.) 90 capsule 4    FLUoxetine 10 MG capsule Take 1 capsule (10 mg total) by mouth once daily. 90 capsule 0    glucos sul 2KCl/msm/chond/C/Mn (GLUCOSAMINE CHONDROITIN ORAL) Take 2 tablets by mouth nightly.      HYDROcodone-acetaminophen (NORCO) 5-325 mg per tablet Take 1 tablet by mouth every 6 (six) hours as needed for Pain. 20 tablet 0    insulin aspart, niacinamide, (FIASP FLEXTOUCH U-100 INSULIN) 100 unit/mL (3 mL) InPn Use with evening meal according to sliding scale. (Patient taking differently: Inject 0-6 Units into the skin before meals as needed (high blood sugar). If bs is over 139mg/dl then give insulin per sliding scale as follows       = 0 units  140-180 = 3 units  181-240 = 4 units  241-300< = 6 units) 1 pen 3    iron polysaccharides (NIFEREX) 150 mg iron Cap Take 1 capsule (150 mg total) by mouth every Mon, Wed, Fri. 40 capsule 4    KRILL OIL ORAL Take 1 capsule by mouth once daily.       Lactobacillus rhamnosus GG (CULTURELLE) 10 billion cell capsule Take 1 capsule by mouth once daily.      lancets Misc To check BG 2 times daily and for hypo/hyperglycemia, to use with insurance preferred meter. Diagnosis Code: E11.65 60 each 0    LEVEMIR FLEXTOUCH U-100 INSULN 100 unit/mL (3 mL) InPn pen INJECT 18 UNITS UNDER THE SKIN EVERY EVENING (Patient taking differently: Inject 18 Units into the skin every evening.) 6 each 3    megestroL (MEGACE) 40 MG Tab Take 1 tablet (40 mg total) by mouth 2 (two) times daily. 60 tablet 2    menthol/zinc oxide (REMEDY CALAZIME INTENSIVE SKIN TOP) Apply 1 application topically 2 (two) times a day.      METANX, ALGAL OIL, 3 mg-35 mg-2 mg -90.314 mg Cap TAKE 1 CAPSULE BY MOUTH ONCE DAILY (Patient taking differently: Take 1 capsule by mouth once daily.) 90 capsule 4    multivitamin with minerals tablet Take 1 tablet by mouth once daily.      pentoxifylline (TRENTAL) 400 mg TbSR TAKE 1 TABLET BY MOUTH 3  TIMES DAILY WITH MEALS (Patient taking differently: Take 400 mg by mouth 3 (three) times daily with meals. TAKE 1 TABLET BY MOUTH 3  TIMES DAILY WITH MEALS) 270 tablet 4    pravastatin (PRAVACHOL) 10 MG tablet Take 1 tablet (10 mg total) by mouth once daily. 90 tablet 4    psyllium 0.52 gram capsule Take 0.52 g by mouth once daily.      rivaroxaban (XARELTO) 15 mg Tab Take 1 tablet (15 mg total) by mouth once daily. (Patient taking differently: Take 15 mg by mouth every evening.) 90 tablet 4    rOPINIRole (REQUIP) 0.5 MG tablet TAKE 1 TABLET BY MOUTH WITH EVENING MEAL AND 1 TABLET  AT BEDTIME (Patient taking differently: Take 0.5 mg by mouth 2 (two) times a day. WITH EVENING MEAL AND 1 TABLET  AT BEDTIME) 180 tablet 4     No current facility-administered medications for this visit.       Review of Systems   Constitutional: Negative for appetite change and unexpected weight change.   HENT: Negative for mouth sores.    Eyes: Negative for visual disturbance.    Respiratory: Negative for cough and shortness of breath.    Cardiovascular: Negative for chest pain.   Gastrointestinal: Negative for abdominal pain and diarrhea.   Genitourinary: Negative for frequency.   Musculoskeletal: Negative for back pain.   Skin: Negative for rash.   Neurological: Negative for headaches.   Hematological: Negative for adenopathy.   Psychiatric/Behavioral: The patient is nervous/anxious.        ECOG Performance Status:   ECOG SCORE    3 - Capable of only limited selfcare, confined to bed or chair more than 50% of waking hours         Objective:      Vitals:   Vitals:    07/26/22 1016   BP: 100/60   BP Location: Left arm   Patient Position: Sitting   BP Method: Medium (Manual)   Pulse: (!) 50   Temp: 96.7 °F (35.9 °C)   TempSrc: Temporal   SpO2: 95%   Weight: 77.6 kg (171 lb)   Height: 6' (1.829 m)     BMI: Body mass index is 23.19 kg/m².    Physical Exam  Vitals reviewed.   Constitutional:       General: He is not in acute distress.  HENT:      Head: Normocephalic and atraumatic.   Eyes:      General: No scleral icterus.        Right eye: No discharge.         Left eye: No discharge.   Cardiovascular:      Rate and Rhythm: Normal rate and regular rhythm.   Pulmonary:      Effort: Pulmonary effort is normal. No respiratory distress.   Abdominal:      General: Bowel sounds are normal. There is no distension.      Comments: Ostomy present    Genitourinary:     Comments: Catheter  Skin:     General: Skin is warm.      Coloration: Skin is not jaundiced.      Findings: No rash.   Neurological:      Mental Status: He is alert and oriented to person, place, and time.         Laboratory Data:  Lab Results   Component Value Date    WBC 11.24 07/26/2022    HGB 12.6 (L) 07/26/2022    HCT 37.7 (L) 07/26/2022    MCV 95 07/26/2022     07/26/2022      Assessment:       1. Cancer, hepatocellular         Plan:   Metastatic Hepatocellular Carcinoma   -PET/CT 6/17/22 showed lesion in the hepatic  parenchyma, bulky lymphadenopathy at the hank hepaticus surrounding the pancreatic head, increased tracer within the L1 vertebral body, and increased uptake in the periaortic and retroperitoneal lymph nodes   -Liver biopsy 7/12/22 consistent with HCC  -Treatment plan of Keytruda 200 mg IV on D1 of a 21 day cycle discussed with patient and spouse.  -Handouts provided from [email or web address] on chemotherapy agents.    -Discussed the mechanism of action, potential side effects of this treatment as well as ways to manage them at home. Some of these side effects include but not limited to fever, nausea, vomiting, decreased appetite, fatigue, weakness, cytopenias, myalgia/arthralgia, constipation, diarrhea, bleeding, headache, nail changes, taste change, hair thinning/loss, peripheral neuropathy, kidney toxicity, or ototoxicity.   -Dietary modifications discussed.  Detail instructions to be provided by dietician.   -Chemotherapy Binder supplied with contact information.   -Discussed follow-up with the physician for toxicity monitoring throughout treatment.    -No refill requests at this time  -The patient and family verbalized understanding. All questions were answered and an informed consent was obtained.  -Chemo  order was placed today  -Follow up with Dr. Patel prior to C1 Keytruda on 7/28/22    Advance Care Planning     Date: 07/26/2022  Patient did not wish or was not able to or provide an Advance Care Plan. Patient's wife states they have paperwork completed at home and on file. Did not address further at today's appointment.        Route Chart for Scheduling    Med Onc Chart Routing      Follow up with physician Other. Dr. Patel as scheduled on 7/28 prior to Keytruda start    Follow up with ZACHARIAH    Infusion scheduling note    Injection scheduling note    Labs    Lab interval:  Patient would like future labs drawn 1-2 days prior to appointments via [facility]    Imaging    Pharmacy appointment    Other  referrals          Treatment Plan Information   OP PEMBROLIZUMAB 200MG Q3W   John Patel MD   Upcoming Treatment Dates - OP PEMBROLIZUMAB 200MG Q3W    7/27/2022       Chemotherapy       pembrolizumab (KEYTRUDA) 200 mg in sodium chloride 0.9% 108 mL infusion  8/17/2022       Chemotherapy       pembrolizumab (KEYTRUDA) 200 mg in sodium chloride 0.9% 108 mL infusion  9/7/2022       Chemotherapy       pembrolizumab (KEYTRUDA) 200 mg in sodium chloride 0.9% 108 mL infusion  9/28/2022       Chemotherapy       pembrolizumab (KEYTRUDA) 200 mg in sodium chloride 0.9% 108 mL infusion

## 2022-07-25 NOTE — TELEPHONE ENCOUNTER
I spoke with Fabian's wife, Kassy about getting an IO appt scheduled per the recommendation of his Hem/Onc MD. I explained in detail what we offer and listed some symptoms/side effects that we can help address using our support services. She verbalized understanding but said his mobility is really poor right now and its hard to get to appts. He already has Home Health and Palliative care and will call if he needs our services.

## 2022-07-26 NOTE — PROGRESS NOTES
Oncology   Chemotherapy School Education  Fabian Lemons Fátima   2/9/1932    Social Service Education   This is a 89 y.o.male with a medical diagnosis of rectal cancer. SW provided support as pt and wife discussed the strain of medical issues. SW educated on availability of psychologist here if would be interested in seeing him now or in the future. Pt said he I was ok but wife seemed overwhelmed and SW will revisit this with her.     Current Support System: Pt lives with his wife Kassy, of 43 years. They do not have children together. Kassy said most of their family live out of the area. Pt does have a sitter to help as needed. Kassy will call her when she has to leave the home so pt is not alone. She has also had to call their sitter to help get the pt in and out of the car for this appointment. Due to the pt's mobility problems she needs help getting pt out of the car. She said he gets in the car easily but it is more difficult to get him out. Worried this may cause stain in getting pt to his treatments. Discussed getting the pt's appointments scheduled out so can talk with sitter in advance increasing the likelihood she would be avaiable to help. SW did educate on WC lift vans and COAST. Kassy said she at this time will try to use her sitter.     Met with pt for new pt education. Reviewed role of  during cancer treatment. Educated on role of SW on care team and resources available at the cancer center.     Answered all psychosocial/socioeconomic related questions. Denied any facial concerns at this time.     Patient provided with social contact number and advised to call with questions or make future appointment if further intervention is needed. SW to follow throughout tx.    Rebeca Bolton, YARON  07/26/2022  3:08 PM

## 2022-07-26 NOTE — PROGRESS NOTES
Oncology Nutrition   New Patient Education  Fabian Lemons Fátima   2/9/1932    Nutrition Education   This is a 89 y.o.male with a medical diagnosis of hepatocellular cancer.     Met w/ pt to discuss current nutritional status and nutrition as it relates to cancer and cancer treatment. Pt currently mild nutrition risk. Pt answered questions with the help of his wife, Kassy. Pt's appetite has been lower since around January 2022. Per chart below, patient's weight has fluctuated since that time but is actually up from 2/6/22. Pt's appetite does go down throughout the day so wife tries to feed him a good, high calorie breakfast. He also eats dessert consisting of pie or ice cream every night before bed. Pt was diagnosed with diabetes in January as well. Answered questions related to DM dx to patient satisfaction and provided tips on eating to maintain/gain weight while also being mindful of blood glucose.    Wt Readings from Last 10 Encounters:   07/26/22 77.6 kg (171 lb)   07/20/22 78 kg (172 lb)   07/12/22 77.1 kg (170 lb)   06/27/22 78 kg (172 lb)   06/23/22 78 kg (172 lb)   06/08/22 81.2 kg (179 lb)   05/07/22 81.2 kg (179 lb 0.2 oz)   04/12/22 81.2 kg (179 lb 0.2 oz)   03/25/22 81.2 kg (179 lb)   02/06/22 73.5 kg (162 lb 0.6 oz)      [x] PMHx reviewed  [x] Labs reviewed    Educated on food safety and common nutrition impact symptoms associated with chemotherapy treatment. Reinforced the importance of good hydration. Handouts provided.    Answered all nutrition related questions.     Patient provided with dietitian contact number and advised to call with questions or make future appointment if further intervention is needed. RD to follow throughout tx PRN.    Tigist Ceballos, MS, RD, LDN  07/26/2022  3:34 PM

## 2022-07-28 NOTE — PLAN OF CARE
Problem: Adult Inpatient Plan of Care  Goal: Plan of Care Review  Outcome: Ongoing, Progressing  Flowsheets (Taken 7/28/2022 1147)  Plan of Care Reviewed With:   patient   spouse   Pt tolerated keytruda infusion well.  No adverse reaction noted.   IV flushed with NS and D/C per protocol.  Patient left clinic in no acute distress.

## 2022-07-28 NOTE — PROGRESS NOTES
PATIENT: Fabian Shine  MRN: [id number]  DATE: 7/28/2022      Diagnosis:   1. Cancer, hepatocellular    2. Other specified disorders involving the immune mechanism, not elsewhere classified     3. Anemia in neoplastic disease    4. Atrial fibrillation, unspecified type    5. Coronary artery disease, unspecified vessel or lesion type, unspecified whether angina present, unspecified whether native or transplanted heart    6. Type 2 diabetes mellitus without complication, with long-term current use of insulin    7. History of bladder cancer    8. History of colon cancer        Chief Complaint: Hepatic Cancer (1 week follow up )    Subjective:     Interval History: Mr. Shine is a 89 y.o. male with A-fib, CAD, DMII, h/o bladder cancer, h/o colon cancer who presents for follow upof metastatic HCC.  Sicne the last clinic visit the patient states he has felt well.  The patient denies CP, cough, SOB, abdominal pain, N/V, constipation, diarrhea.  The patient denies fever, chills, night sweats, weight loss, new lumps or bumps, easy bruising or bleeding.    Prior History:  The patient has been under the care of Dr Mccall for bladder cancer s/p prior resection and administration of intravesicular BCG.  Last cystoscopy done 12/21/21 had pathology showing no evidence of tumor but did show florid lymphoplasmacytic inflammation in atypical grandular epithelium consistent with severe cystitis.  The patient had prior undergone colonoscopy on 04/17/2018 with invasive adenocarcinoma seen on rectosigmoid biopsy.  The patient underwent partial colectomy on 05/28/2018 with formation of an ostomy with path showing adenocarcinoma measuring 3.3 cm in greatest dimension with invasion of the muscularis propria, grade 2, negative margins, no lymphovascular invasion, no perineural invasion, and 0/20 lymph nodes positive.  The patient was followed by Dr. Huang and found to have an elevated Ca of 13.3 ng/mL on 05/16/2022.  The patient underwent  PET-CT on 06/17/2022 showing  increased tracer centrally within the hepatic parenchyma, bulky lymphadenopathy at the hank hepaticus surrounding the pancreatic head, increased tracer within the L1 vertebral body, and increased uptake in the periaortic and retroperitoneal lymph nodes.   The carlos underwent IR biopsy of the liver on 7/12/22 with results showing carcinoma with morphologic features consistent of adenocarcinoma and hepatocellular carcinoma.  Past Medical History:   Past Medical History:   Diagnosis Date    Anticoagulant long-term use     Atrial fibrillation     Bladder cancer 12/2021    Colon cancer 05/2018    Coronary artery disease     afib    Diabetes mellitus, type 2     General anesthetics causing adverse effect in therapeutic use     hallucinations for several days       Past Surgical HIstory:   Past Surgical History:   Procedure Laterality Date    ANGIOPLASTY Left     LEG    APPENDECTOMY      BLADDER TUMOR EXCISION      x 2    CATARACT EXTRACTION W/  INTRAOCULAR LENS IMPLANT Left 10/31/2018    Procedure: phaco/pciol;  Surgeon: Paola Post MD;  Location: [facility] OR;  Service: Ophthalmology;  Laterality: Left;  topical; will need 10% Phenylephrine, Visitec I-Ring; please use Omidria if available.OMIDRIA ADDED TO   500   ML BSS PLUS    CATARACT EXTRACTION W/  INTRAOCULAR LENS IMPLANT Right     COLONOSCOPY N/A 4/17/2018    Procedure: COLONOSCOPY;  Surgeon: Carlos Redmond MD;  Location: [facility];  Service: Endoscopy;  Laterality: N/A;    COLONOSCOPY N/A 5/21/2019    Procedure: COLONOSCOPY;  Surgeon: Carlos Redmond MD;  Location: [facility];  Service: Endoscopy;  Laterality: N/A;    COLONOSCOPY N/A 9/9/2020    Procedure: COLONOSCOPY;  Surgeon: Carlos Redmond MD;  Location: [facility];  Service: Endoscopy;  Laterality: N/A;    COLOSTOMY N/A 5/28/2018    Procedure: COLOSTOMY;  Surgeon: Willis Huang MD;  Location: [facility] OR;  Service: Colon and Rectal;   Laterality: N/A;    COLOSTOMY      CYSTOSCOPY WITH BIOPSY OF BLADDER N/A 12/21/2021    Procedure: CYSTOSCOPY, WITH BLADDER BIOPSY, WITH FULGURATION IF INDICATED;  Surgeon: WISAM Mccall MD;  Location: [facility] OR;  Service: Urology;  Laterality: N/A;  No BIOPSY    CYSTOSCOPY WITH URETEROSCOPY, RETROGRADE PYELOGRAPHY, AND INSERTION OF STENT Right 1/31/2022    Procedure: CYSTOSCOPY, WITH RETROGRADE PYELOGRAM AND RIGHT URETERAL STENT INSERTION;  Surgeon: WISAM Mccall MD;  Location: [facility] OR;  Service: Urology;  Laterality: Right;    EXTRACORPOREAL SHOCK WAVE LITHOTRIPSY Right 4/12/2022    Procedure: LITHOTRIPSY, ESWL;  Surgeon: WISAM Mccall MD;  Location: [facility] OR;  Service: Urology;  Laterality: Right;    FLEXIBLE SIGMOIDOSCOPY N/A 7/7/2021    Procedure: SIGMOIDOSCOPY, FLEXIBLE;  Surgeon: Willis Huang MD;  Location: [facility];  Service: General;  Laterality: N/A;    HERNIA REPAIR Left     inginual hernia    PHACOEMULSIFICATION OF CATARACT Right 9/5/2018    Procedure: PHACOEMULSIFICATION, CATARACT;  Surgeon: Paola Post MD;  Location: [facility] OR;  Service: Ophthalmology;  Laterality: Right;  topical; will need 10% phenylephrine for dilation. Keep sedation light due to restless legs    tibial stent Right     2 stents to right leg    TONSILLECTOMY      TUMOR REMOVAL      BLADDER       Family History:   Family History   Problem Relation Age of Onset    Heart disease Father        Social History:  reports that he has never smoked. He has never used smokeless tobacco. He reports previous alcohol use. He reports that he does not use drugs.    Allergies:  Review of patient's allergies indicates:   Allergen Reactions    Mirtazapine (bulk) Other (See Comments)     Pt becomes disorientated and very fatigued     Bactrim [sulfamethoxazole-trimethoprim] Rash     Rash on chest and back       Medications:  Current Outpatient Medications   Medication Sig Dispense Refill    ALPRAZolam (XANAX) 0.25  "MG tablet Take 1 tablet (0.25 mg total) by mouth daily as needed for Insomnia or Anxiety (For insomnia or anxiety.). 30 tablet 1    BD GARIMA 2ND GEN PEN NEEDLE 32 gauge x 5/32" Ndle       bisoprolol (ZEBETA) 5 MG tablet Take 1 tablet (5 mg total) by mouth once daily. 90 tablet 4    blood sugar diagnostic Strp 1 strip by Misc.(Non-Drug; Combo Route) route 2 (two) times a day. 180 strip 4    blood-glucose meter kit To check BG 2 times daily and for hypo/hyperglycemia, to use with insurance preferred meter. Diagnosis Code: E11.65 1 each 0    clotrimazole-betamethasone 1-0.05% (LOTRISONE) cream Apply topically 2 (two) times daily. 45 g 0    cranberry 400 mg Cap Take 400 mg by mouth once daily.      digoxin (LANOXIN) 250 mcg tablet Take 1 tablet (250 mcg total) by mouth once daily. (Patient taking differently: Take 250 mcg by mouth once daily at 6am. Does not take on Sunday) 90 tablet 4    diltiaZEM (CARDIZEM CD) 240 MG 24 hr capsule Take 1 capsule (240 mg total) by mouth once daily. 90 capsule 4    famotidine (PEPCID) 20 MG tablet TAKE 1 TABLET BY MOUTH ONCE DAILY 30 tablet 2    finasteride (PROSCAR) 5 mg tablet Take 1 tablet (5 mg total) by mouth once daily. (Patient taking differently: Take 5 mg by mouth nightly.) 90 tablet 4    FLOMAX 0.4 mg Cap TAKE 1 CAPSULE ONCE DAILY. (Patient taking differently: Take 0.4 mg by mouth nightly.) 90 capsule 4    FLUoxetine 10 MG capsule Take 1 capsule (10 mg total) by mouth once daily. 90 capsule 0    glucos sul 2KCl/msm/chond/C/Mn (GLUCOSAMINE CHONDROITIN ORAL) Take 2 tablets by mouth nightly.      HYDROcodone-acetaminophen (NORCO) 5-325 mg per tablet Take 1 tablet by mouth every 6 (six) hours as needed for Pain. 20 tablet 0    insulin aspart, niacinamide, (FIASP FLEXTOUCH U-100 INSULIN) 100 unit/mL (3 mL) InPn Use with evening meal according to sliding scale. (Patient taking differently: Inject 0-6 Units into the skin before meals as needed (high blood sugar). If bs " is over 139mg/dl then give insulin per sliding scale as follows       = 0 units  140-180 = 3 units  181-240 = 4 units  241-300< = 6 units) 1 pen 3    iron polysaccharides (NIFEREX) 150 mg iron Cap Take 1 capsule (150 mg total) by mouth every Mon, Wed, Fri. 40 capsule 4    KRILL OIL ORAL Take 1 capsule by mouth once daily.      Lactobacillus rhamnosus GG (CULTURELLE) 10 billion cell capsule Take 1 capsule by mouth once daily.      lancets Misc To check BG 2 times daily and for hypo/hyperglycemia, to use with insurance preferred meter. Diagnosis Code: E11.65 60 each 0    LEVEMIR FLEXTOUCH U-100 INSULN 100 unit/mL (3 mL) InPn pen INJECT 18 UNITS UNDER THE SKIN EVERY EVENING (Patient taking differently: Inject 18 Units into the skin every evening.) 6 each 3    megestroL (MEGACE) 40 MG Tab Take 1 tablet (40 mg total) by mouth 2 (two) times daily. 60 tablet 2    menthol/zinc oxide (REMEDY CALAZIME INTENSIVE SKIN TOP) Apply 1 application topically 2 (two) times a day.      METANX, ALGAL OIL, 3 mg-35 mg-2 mg -90.314 mg Cap TAKE 1 CAPSULE BY MOUTH ONCE DAILY (Patient taking differently: Take 1 capsule by mouth once daily.) 90 capsule 4    multivitamin with minerals tablet Take 1 tablet by mouth once daily.      pentoxifylline (TRENTAL) 400 mg TbSR TAKE 1 TABLET BY MOUTH 3  TIMES DAILY WITH MEALS (Patient taking differently: Take 400 mg by mouth 3 (three) times daily with meals. TAKE 1 TABLET BY MOUTH 3  TIMES DAILY WITH MEALS) 270 tablet 4    pravastatin (PRAVACHOL) 10 MG tablet Take 1 tablet (10 mg total) by mouth once daily. 90 tablet 4    psyllium 0.52 gram capsule Take 0.52 g by mouth once daily.      rivaroxaban (XARELTO) 15 mg Tab Take 1 tablet (15 mg total) by mouth once daily. (Patient taking differently: Take 15 mg by mouth every evening.) 90 tablet 4    rOPINIRole (REQUIP) 0.5 MG tablet TAKE 1 TABLET BY MOUTH WITH EVENING MEAL AND 1 TABLET  AT BEDTIME (Patient taking differently: Take 0.5 mg by  mouth 2 (two) times a day. WITH EVENING MEAL AND 1 TABLET  AT BEDTIME) 180 tablet 4     No current facility-administered medications for this visit.     Facility-Administered Medications Ordered in Other Visits   Medication Dose Route Frequency Provider Last Rate Last Admin    alteplase injection 2 mg  2 mg Intra-Catheter PRN John Patel MD        diphenhydrAMINE injection 50 mg  50 mg Intravenous Once PRN John Patel MD        EPINEPHrine (EPIPEN) 0.3 mg/0.3 mL pen injection 0.3 mg  0.3 mg Intramuscular Once PRN John Patel MD        heparin, porcine (PF) 100 unit/mL injection flush 500 Units  500 Units Intravenous PRN John Patel MD        hydrocortisone sodium succinate injection 100 mg  100 mg Intravenous Once PRN John Patel MD        pembrolizumab (KEYTRUDA) 200 mg in sodium chloride 0.9% 108 mL infusion  200 mg Intravenous 1 time in Clinic/HOD John Patel  mL/hr at 07/28/22 1051 200 mg at 07/28/22 1051    sodium chloride 0.9% 100 mL flush bag   Intravenous 1 time in Clinic/HOD John Patel MD        sodium chloride 0.9% flush 10 mL  10 mL Intravenous PRN John Patel MD           Review of Systems   Constitutional: Negative for chills, diaphoresis, fatigue, fever and unexpected weight change.   Respiratory: Negative for cough and shortness of breath.    Cardiovascular: Negative for chest pain and palpitations.   Gastrointestinal: Negative for abdominal pain, constipation, diarrhea, nausea and vomiting.   Skin: Negative for color change and rash.   Neurological: Negative for headaches.   Hematological: Negative for adenopathy. Does not bruise/bleed easily.       ECOG Performance Status: 3   Objective:      Vitals:   Vitals:    07/28/22 0935   BP: 96/60   BP Location: Right arm   Patient Position: Sitting   BP Method: Medium (Manual)   Pulse: 80   Temp: 97.4 °F (36.3 °C)   TempSrc: Temporal   SpO2: 98%   Weight: 77.6 kg (171 lb)   Height: 6' (1.829 m)        Physical Exam  Constitutional:       General: He is not in acute distress.     Appearance: He is well-developed. He is not diaphoretic.   HENT:      Head: Normocephalic and atraumatic.   Cardiovascular:      Rate and Rhythm: Normal rate and regular rhythm.      Heart sounds: Normal heart sounds. No murmur heard.    No friction rub. No gallop.   Pulmonary:      Effort: Pulmonary effort is normal. No respiratory distress.      Breath sounds: Normal breath sounds. No wheezing or rales.   Chest:      Chest wall: No tenderness.   Breasts:      Right: No axillary adenopathy or supraclavicular adenopathy.      Left: No axillary adenopathy or supraclavicular adenopathy.       Abdominal:      General: Bowel sounds are normal. There is no distension.      Palpations: Abdomen is soft. There is no mass.      Tenderness: There is no abdominal tenderness. There is no rebound.   Musculoskeletal:      Cervical back: Normal range of motion.      Right lower leg: No edema.      Left lower leg: No edema.   Lymphadenopathy:      Cervical: No cervical adenopathy.      Upper Body:      Right upper body: No supraclavicular or axillary adenopathy.      Left upper body: No supraclavicular or axillary adenopathy.   Skin:     General: Skin is warm and dry.      Findings: No erythema or rash.   Neurological:      Mental Status: He is alert and oriented to person, place, and time.   Psychiatric:         Behavior: Behavior normal.         Laboratory Data:  Lab Visit on 07/26/2022   Component Date Value Ref Range Status    WBC 07/26/2022 11.24  3.90 - 12.70 K/uL Final    RBC 07/26/2022 3.99 (A) 4.60 - 6.20 M/uL Final    Hemoglobin 07/26/2022 12.6 (A) 14.0 - 18.0 g/dL Final    Hematocrit 07/26/2022 37.7 (A) 40.0 - 54.0 % Final    MCV 07/26/2022 95  82 - 98 fL Final    MCH 07/26/2022 31.6 (A) 27.0 - 31.0 pg Final    MCHC 07/26/2022 33.4  32.0 - 36.0 g/dL Final    RDW 07/26/2022 14.9 (A) 11.5 - 14.5 % Final    Platelets 07/26/2022 199   150 - 450 K/uL Final    MPV 07/26/2022 8.8 (A) 9.2 - 12.9 fL Final    Immature Granulocytes 07/26/2022 0.4  0.0 - 0.5 % Final    Gran # (ANC) 07/26/2022 7.3  1.8 - 7.7 K/uL Final    Immature Grans (Abs) 07/26/2022 0.05 (A) 0.00 - 0.04 K/uL Final    Comment: Mild elevation in immature granulocytes is non specific and   can be seen in a variety of conditions including stress response,   acute inflammation, trauma and pregnancy. Correlation with other   laboratory and clinical findings is essential.      Lymph # 07/26/2022 2.5  1.0 - 4.8 K/uL Final    Mono # 07/26/2022 1.2 (A) 0.3 - 1.0 K/uL Final    Eos # 07/26/2022 0.3  0.0 - 0.5 K/uL Final    Baso # 07/26/2022 0.02  0.00 - 0.20 K/uL Final    nRBC 07/26/2022 0  0 /100 WBC Final    Gran % 07/26/2022 64.6  38.0 - 73.0 % Final    Lymph % 07/26/2022 22.0  18.0 - 48.0 % Final    Mono % 07/26/2022 10.4  4.0 - 15.0 % Final    Eosinophil % 07/26/2022 2.4  0.0 - 8.0 % Final    Basophil % 07/26/2022 0.2  0.0 - 1.9 % Final    Differential Method 07/26/2022 Automated   Final    Sodium 07/26/2022 137  136 - 145 mmol/L Final    Potassium 07/26/2022 4.5  3.5 - 5.1 mmol/L Final    Chloride 07/26/2022 103  95 - 110 mmol/L Final    CO2 07/26/2022 26  23 - 29 mmol/L Final    Glucose 07/26/2022 135 (A) 70 - 110 mg/dL Final    BUN 07/26/2022 18  8 - 23 mg/dL Final    Creatinine 07/26/2022 1.0  0.5 - 1.4 mg/dL Final    Calcium 07/26/2022 9.5  8.7 - 10.5 mg/dL Final    Total Protein 07/26/2022 6.6  6.0 - 8.4 g/dL Final    Albumin 07/26/2022 3.3 (A) 3.5 - 5.2 g/dL Final    Total Bilirubin 07/26/2022 0.7  0.1 - 1.0 mg/dL Final    Comment: For infants and newborns, interpretation of results should be based  on gestational age, weight and in agreement with clinical  observations.    Premature Infant recommended reference ranges:  Up to 24 hours.............<8.0 mg/dL  Up to 48 hours............<12.0 mg/dL  3-5 days..................<15.0 mg/dL  6-29  days.................<15.0 mg/dL      Alkaline Phosphatase 07/26/2022 74  55 - 135 U/L Final    AST 07/26/2022 25  10 - 40 U/L Final    ALT 07/26/2022 16  10 - 44 U/L Final    Anion Gap 07/26/2022 8  8 - 16 mmol/L Final    eGFR if African American 07/26/2022 >60  >60 mL/min/1.73 m^2 Final    eGFR if non African American 07/26/2022 >60  >60 mL/min/1.73 m^2 Final    Comment: Calculation used to obtain the estimated glomerular filtration  rate (eGFR) is the CKD-EPI equation.       TSH 07/26/2022 1.951  0.400 - 4.000 uIU/mL Final         Imaging: PET/CT 6/17/22    Quality of the study: Adequate.     There is increased tracer uptake seen centrally within the hepatic parenchyma showing max SUV of 11.  Transmission CT images show an ill-defined low-attenuation lesion in this area measuring approximately 6.5 cm in greatest transaxial dimension.     There is bulky lymphadenopathy seen at the hank hepatis and surrounding the pancreatic head with the max SUV of 10 and largest node measuring is 2.1 cm in short axis.     There is increased tracer uptake within the L1 vertebral body showing max SUV of 6.1.  No discrete lesion is seen in this region on transmission CT images.     There is increased tracer uptake within a periaortic, retroperitoneal node showing max SUV of 9.7.  This measures 1.1 cm in short axis.     Physiologic uptake of the tracer is present within the brain, salivary glands, myocardium, GI and  tracts.     Incidental CT findings: N/A       Assessment:       1. Cancer, hepatocellular    2. Other specified disorders involving the immune mechanism, not elsewhere classified     3. Anemia in neoplastic disease    4. Atrial fibrillation, unspecified type    5. Coronary artery disease, unspecified vessel or lesion type, unspecified whether angina present, unspecified whether native or transplanted heart    6. Type 2 diabetes mellitus without complication, with long-term current use of insulin    7. History  of bladder cancer    8. History of colon cancer           Plan:     Metastatic Hepatocellular Carcinoma - PET/CT 6/17/22 showed lesion in the hepatic parenchyma, bulky lymphadenopathy at the hank hepaticus surrounding the pancreatic head, increased tracer within the L1 vertebral body, and increased uptake in the periaortic and retroperitoneal lymph nodes   -Liver biopsy 7/12/22 consistent with HCC  -Given the patient's performance status, treatment with immunotherapy with Pembrolizumab was pursued  -Will start cyeel 1 of pembrolizumab today    Anemia - iron studies 627/22 consistent with anemia of chronic disease  -Hemoglobin was 12.6 on 7/26/22  -wIll monitor    A-fib - pt on digoxin, diltiazem, and xarelto  -Management per cardiology    CAD - Xarelto, statin, bisoprolol   -Management per cardiology    DMII - pt on insulin  -Last A1C was 7.5 on 1/27/22  -Stable  -Management per PCP    History of Bladder Cancer - pt monitered by Dr Mccall  -Pt with indwelling urinary catheter  -Management per Urology    History of Colon Cancer - pt is s/p hemicolectomy for rectal cancer 5/28/18  -Recent biopsy more consistent with HCC    Route Chart for Scheduling    Med Onc Chart Routing      Follow up with physician 3 weeks. The patient can proceed with treatment.  He will need a CBC, CMP,TSH and INR through home health on 8/16/22 with an appt with me and treatmetn on 8/18/22.   Follow up with ZACHARIAH    Infusion scheduling note    Injection scheduling note    Labs    Imaging    Pharmacy appointment    Other referrals          Treatment Plan Information   OP PEMBROLIZUMAB 200MG Q3W   John Patel MD   Upcoming Treatment Dates - OP PEMBROLIZUMAB 200MG Q3W    8/18/2022       Chemotherapy       pembrolizumab (KEYTRUDA) 200 mg in sodium chloride 0.9% 108 mL infusion  9/8/2022       Chemotherapy       pembrolizumab (KEYTRUDA) 200 mg in sodium chloride 0.9% 108 mL infusion  9/29/2022       Chemotherapy       pembrolizumab (KEYTRUDA)  200 mg in sodium chloride 0.9% 108 mL infusion  10/20/2022       Chemotherapy       pembrolizumab (KEYTRUDA) 200 mg in sodium chloride 0.9% 108 mL infusion      John Patel MD  [facility]  Hematology and Oncology  [facility]   [address] [place] [address]   O: [phone]  F: [phone]

## 2022-08-03 PROBLEM — C78.7 METASTATIC COLON CANCER TO LIVER: Status: ACTIVE | Noted: 2022-01-01

## 2022-08-03 PROBLEM — N30.90 CYSTITIS: Status: ACTIVE | Noted: 2022-01-01

## 2022-08-03 PROBLEM — G93.41 ACUTE METABOLIC ENCEPHALOPATHY: Status: ACTIVE | Noted: 2022-01-01

## 2022-08-03 PROBLEM — C18.9 METASTATIC COLON CANCER TO LIVER: Status: ACTIVE | Noted: 2022-01-01

## 2022-08-09 NOTE — TELEPHONE ENCOUNTER
----- Message from Ayana Harrison RN sent at 8/9/2022  2:15 PM CDT -----  Regarding: [facility] ER visit f/u 8/9/2022 Dx: Acute DVT left arm  Please call patient's wife [phone] to schedule a close follow up appointment. Patient has difficulty with transportation. Wife asks if at all possible could he be seen on 8/16/22 between 11am and 1pm, please? He is still in the ER at [facility].     Thanks,    Ayana Harrison, RN, BSN  ED Navigator/Case Management  [phone]

## 2022-08-16 NOTE — PROGRESS NOTES
Subjective:    Patient ID:  Fabian Shine is a 89 y.o. male patient here for evaluation Follow-up (From ER)      History of Present Illness:  Cardiology follow-up the emergency room.  Patient with chronic venous excess issues.  Left venous AV fistula from port access site, stable.  Patient remains on chronic oral anticoagulation for persistent atrial fibrillation with underlying valvular heart disease manifested by mild AS, preserved ejection fraction.  No syncope /presyncope.  No angina.  No significant change in exertional dyspnea.  No PND orthopnea.      Patient with peripheral arterial disease status post lower extremity percutaneous revascularization remote past.  Asymptomatic at present.  No claudication.      Concomitant problems of bladder, colon and hepatic CA, currently on immunotherapy.             Review of patient's allergies indicates:   Allergen Reactions    Mirtazapine (bulk) Other (See Comments)     Pt becomes disorientated and very fatigued     Bactrim [sulfamethoxazole-trimethoprim] Rash     Rash on chest and back       Past Medical History:   Diagnosis Date    Anticoagulant long-term use     Atrial fibrillation     Bladder cancer 12/2021    Colon cancer 05/2018    Coronary artery disease     afib    Diabetes mellitus, type 2     General anesthetics causing adverse effect in therapeutic use     hallucinations for several days     Past Surgical History:   Procedure Laterality Date    ANGIOPLASTY Left     LEG    APPENDECTOMY      BLADDER TUMOR EXCISION      x 2    CATARACT EXTRACTION W/  INTRAOCULAR LENS IMPLANT Left 10/31/2018    Procedure: phaco/pciol;  Surgeon: Paola Post MD;  Location: [facility] OR;  Service: Ophthalmology;  Laterality: Left;  topical; will need 10% Phenylephrine, Visitec I-Ring; please use Omidria if available.OMIDRIA ADDED TO   500   ML BSS PLUS    CATARACT EXTRACTION W/  INTRAOCULAR LENS IMPLANT Right     COLONOSCOPY N/A 4/17/2018    Procedure:  COLONOSCOPY;  Surgeon: Carlos Redmond MD;  [facility];  Service: Endoscopy;  Laterality: N/A;    COLONOSCOPY N/A 5/21/2019    Procedure: COLONOSCOPY;  Surgeon: Carlos Redmond MD;  Location: [facility] ENDO;  Service: Endoscopy;  Laterality: N/A;    COLONOSCOPY N/A 9/9/2020    Procedure: COLONOSCOPY;  Surgeon: Carlos Redmond MD;  Location: [facility] ENDO;  Service: Endoscopy;  Laterality: N/A;    COLOSTOMY N/A 5/28/2018    Procedure: COLOSTOMY;  Surgeon: Willis Huang MD;  Location: [facility] OR;  Service: Colon and Rectal;  Laterality: N/A;    COLOSTOMY      CYSTOSCOPY WITH BIOPSY OF BLADDER N/A 12/21/2021    Procedure: CYSTOSCOPY, WITH BLADDER BIOPSY, WITH FULGURATION IF INDICATED;  Surgeon: WISAM Mccall MD;  Location: [facility] OR;  Service: Urology;  Laterality: N/A;  No BIOPSY    CYSTOSCOPY WITH URETEROSCOPY, RETROGRADE PYELOGRAPHY, AND INSERTION OF STENT Right 1/31/2022    Procedure: CYSTOSCOPY, WITH RETROGRADE PYELOGRAM AND RIGHT URETERAL STENT INSERTION;  Surgeon: WISAM Mccall MD;  Location: [facility] OR;  Service: Urology;  Laterality: Right;    EXTRACORPOREAL SHOCK WAVE LITHOTRIPSY Right 4/12/2022    Procedure: LITHOTRIPSY, ESWL;  Surgeon: WISAM Mccall MD;  Location: [facility] OR;  Service: Urology;  Laterality: Right;    FLEXIBLE SIGMOIDOSCOPY N/A 7/7/2021    Procedure: SIGMOIDOSCOPY, FLEXIBLE;  Surgeon: Willis Huang MD;  Location: [facility];  Service: General;  Laterality: N/A;    HERNIA REPAIR Left     inginual hernia    PHACOEMULSIFICATION OF CATARACT Right 9/5/2018    Procedure: PHACOEMULSIFICATION, CATARACT;  Surgeon: Paola Post MD;  Location: [facility] OR;  Service: Ophthalmology;  Laterality: Right;  topical; will need 10% phenylephrine for dilation. Keep sedation light due to restless legs    tibial stent Right     2 stents to right leg    TONSILLECTOMY      TUMOR REMOVAL      BLADDER     Social History     Tobacco Use    Smoking status: Never Smoker     Smokeless tobacco: Never Used   Substance Use Topics    Alcohol use: Not Currently     Alcohol/week: 0.0 standard drinks    Drug use: No        Review of Systems:    As noted in HPI in addition      REVIEW OF SYSTEMS  Review of Systems   Constitutional: Negative for decreased appetite, diaphoresis, night sweats, weight gain and weight loss.   HENT: Negative for nosebleeds and odynophagia.    Eyes: Negative for double vision and photophobia.   Cardiovascular: Negative for chest pain, claudication, cyanosis, dyspnea on exertion, irregular heartbeat, leg swelling, near-syncope, orthopnea, palpitations, paroxysmal nocturnal dyspnea and syncope.   Respiratory: Negative for cough, hemoptysis, shortness of breath and wheezing.    Hematologic/Lymphatic: Negative for adenopathy.   Skin: Negative for flushing, skin cancer and suspicious lesions.   Musculoskeletal: Negative for gout, myalgias and neck pain.   Gastrointestinal: Negative for abdominal pain, heartburn, hematemesis and hematochezia.   Genitourinary: Negative for bladder incontinence, hesitancy and nocturia.   Neurological: Negative for focal weakness, headaches, light-headedness and paresthesias.   Psychiatric/Behavioral: Negative for memory loss and substance abuse.              Objective:        Vitals:    08/16/22 1032   BP: (!) 108/50   Pulse: (!) 57       Lab Results   Component Value Date    WBC 7.48 08/15/2022    WBC 7.48 08/15/2022    HGB 11.7 (L) 08/15/2022    HGB 11.7 (L) 08/15/2022    HCT 35.7 (L) 08/15/2022    HCT 35.7 (L) 08/15/2022     08/15/2022     08/15/2022    CHOL 166 09/13/2021    TRIG 128 09/13/2021    HDL 40 09/13/2021    ALT 29 08/15/2022    ALT 29 08/15/2022    AST 58 08/15/2022    AST 58 08/15/2022     08/15/2022     08/15/2022    K 4.2 08/15/2022    K 4.2 08/15/2022    CL 98 08/15/2022    CL 98 08/15/2022    CREATININE 0.70 08/15/2022    CREATININE 0.70 08/15/2022    BUN 19 08/15/2022    BUN 19 08/15/2022     CO2 26 08/15/2022    CO2 26 08/15/2022    TSH 1.380 08/15/2022    PSA 0.91 08/13/2020    INR 1.5 08/15/2022    HGBA1C 7.5 (H) 01/27/2022        ECHOCARDIOGRAM RESULTS  Results for orders placed during the hospital encounter of 01/26/22    Echo    Interpretation Summary  · The left ventricle is normal in size with concentric remodeling and normal systolic function.  · The estimated ejection fraction is 50-55%.  · Normal left ventricular diastolic function.  · Normal right ventricular size with normal right ventricular systolic function.  · Mild left atrial enlargement.  · There is mild-to-moderate aortic valve stenosis.  · Aortic valve area is 1.10 cm2; peak velocity is 2.86 m/s; mean gradient is 24 mmHg.  · Mild tricuspid regurgitation.  · Elevated central venous pressure (15 mmHg).  · The estimated PA systolic pressure is 42 mmHg.        CURRENT/PREVIOUS VISIT EKG  Results for orders placed or performed during the hospital encounter of 08/03/22   EKG 12-lead    Collection Time: 08/03/22  3:56 PM    Narrative    Test Reason : R41.82,    Vent. Rate : 059 BPM     Atrial Rate : 000 BPM     P-R Int : 000 ms          QRS Dur : 094 ms      QT Int : 384 ms       P-R-T Axes : 000 -74 083 degrees     QTc Int : 380 ms    Atrial fibrillation with slow ventricular response  Left axis deviation  Low voltage QRS  Incomplete right bundle branch block  Cannot rule out Anterior infarct ,age undetermined  Abnormal ECG  When compared with ECG of 07-MAY-2022 08:52,  Incomplete right bundle branch block has replaced Right bundle branch block  Minimal criteria for Anterior infarct are now Present  Confirmed by Scottie Cardenas MD ([id number]) on 8/5/2022 9:59:49 AM    Referred By: [email or web address]   SELF           Confirmed By:Scottie Cardenas MD     No valid procedures specified.   No results found for this or any previous visit.    No valid procedures specified.    PHYSICAL EXAM  CONSTITUTIONAL: Well built, well nourished in no apparent distress  NECK:  "no carotid bruit, no JVD  LUNGS: CTA  CHEST WALL: no tenderness,  HEART: regular rate and rhythm, S1, S2  Distant.  Grade 2/6 crescendo decrescendo murmur aortic area.  PMI poorly localized.  ABDOMEN: soft, non-tender; bowel sounds normal; no masses,  no organomegaly  EXTREMITIES: Extremities normal  Nonpittingedema, no calf tenderness noted  NEURO: AAO X 3    I HAVE REVIEWED :    The vital signs, nurses notes, and all the pertinent radiology and labs.         Current Outpatient Medications   Medication Instructions    ALPRAZolam (XANAX) 0.25 mg, Oral, Daily PRN    amoxicillin (AMOXIL) 500 mg, Oral, Every 12 hours    BD GARIMA 2ND GEN PEN NEEDLE 32 gauge x 5/32" Ndle 1 each, Other, 4 times daily    bisoprolol (ZEBETA) 5 mg, Oral, Daily    blood sugar diagnostic Strp 1 strip, Misc.(Non-Drug; Combo Route), 2 times daily    blood-glucose meter kit To check BG 2 times daily and for hypo/hyperglycemia, to use with insurance preferred meter. Diagnosis Code: E11.65    clotrimazole-betamethasone 1-0.05% (LOTRISONE) cream Topical (Top), 2 times daily    cranberry 400 mg, Oral, with lunch    digoxin (LANOXIN) 250 mcg, Oral, Every Mon, Tues, Wed, Thurs, Fri, Does not take on Sunday    diltiaZEM (CARDIZEM CD) 240 mg, Oral, Daily    famotidine (PEPCID) 20 MG tablet TAKE 1 TABLET BY MOUTH ONCE DAILY    finasteride (PROSCAR) 5 mg, Oral, Daily    FLOMAX 0.4 mg Cap TAKE 1 CAPSULE ONCE DAILY.    FLUoxetine 10 mg, Oral, Daily    glucos sul 2KCl/msm/chond/C/Mn (GLUCOSAMINE CHONDROITIN ORAL) 2 tablets, Oral, Nightly    heparin sodium,bovine (HEPARIN, BOVINE, INJ) 5 mLs, Intravenous, See admin instructions,  5 ml of Heparin (10 units/ml) after 10 cc flush NS<BR>    HYDROcodone-acetaminophen (NORCO) 5-325 mg per tablet 1 tablet, Oral, Every 6 hours PRN    insulin aspart, niacinamide, (FIASP FLEXTOUCH U-100 INSULIN) 100 unit/mL (3 mL) InPn Use with evening meal according to sliding scale.    iron polysaccharides (NIFEREX) " 150 mg, Oral, Every Mon, Wed, Fri    krill oil 500 mg, Oral, with lunch    Lactobacillus rhamnosus GG (CULTURELLE) 10 billion cell capsule 1 capsule, Oral, Daily    lancets Misc To check BG 2 times daily and for hypo/hyperglycemia, to use with insurance preferred meter. Diagnosis Code: E11.65    LEVEMIR FLEXTOUCH U-100 INSULN 100 unit/mL (3 mL) InPn pen INJECT 18 UNITS UNDER THE SKIN EVERY EVENING    melatonin (MELATIN) Oral, Nightly    menthol/zinc oxide (REMEDY CALAZIME INTENSIVE SKIN TOP) 1 application, Topical (Top), 2 times daily    MEROPENEM IV 1 g, Intravenous, Every 8 hours, Infuse 1 gram Meropenem in 100 mls NS over 30 mintues via disposable elastomeric pump every 8 hours    METANX, ALGAL OIL, 3 mg-35 mg-2 mg -90.314 mg Cap TAKE 1 CAPSULE BY MOUTH ONCE DAILY    multivitamin with minerals tablet 1 tablet, Oral, Daily    pentoxifylline (TRENTAL) 400 mg TbSR TAKE 1 TABLET BY MOUTH 3  TIMES DAILY WITH MEALS    pravastatin (PRAVACHOL) 10 mg, Oral, Daily    psyllium 0.52 g, Oral, Daily    rivaroxaban (XARELTO) 15 mg, Oral, Daily    rOPINIRole (REQUIP) 0.5 MG tablet TAKE 1 TABLET BY MOUTH WITH EVENING MEAL AND 1 TABLET  AT BEDTIME    sodium chloride 0.9 %, flush, (NORMAL SALINE FLUSH INJ) 10 mLs, Intravenous, See admin instructions, IV FLUSH: MIDLINE<BR><BR>SN to Perform/ may Instruct patient/ caregiver to perform IV MIDLINE flush:<BR>Flush before/ after Antibiotic with 10 ml 0.9% Normal saline           Assessment:     PERSISTENT ATRIAL FIBRILLATION, RATE CONTROLLED.    PRESERVED EJECTION FRACTION WITH MILD AS.    CHRONIC VENOUS ACCESS PORT.    PERIPHERAL ARTERIAL DISEASE STATUS POST REMOTE LOWER EXTREMITY PERCUTANEOUS REVASCULARIZATION.    BLADDER, COLON, HEPATIC CA, CURRENTLY ON IMMUNOTHERAPY.        Plan:     ER VISIT WITH PORT ACCESS ISSUES.  ULTRASOUND VENOUS OF THE UPPER EXTREMITY REVEALED A HEMATOMA AT THE PICC LINE WITH SMALL VENOUS AV FISTULA.  ASYMPTOMATIC.      CONTINUE ORAL  ANTICOAGULATION AS SCHEDULED.      PLAN PER PRIMARY CARE AND HEMATOLOGY ONCOLOGY.      FOUR MONTHS          No follow-ups on file.

## 2022-08-18 NOTE — PLAN OF CARE
Problem: Adult Inpatient Plan of Care  Goal: Plan of Care Review  Outcome: Ongoing, Progressing   Pt tolerated Keytruda well today. Reviewed follow-up appointments. All questions were answered, pt d/c via home wheelchair with wife.

## 2022-08-18 NOTE — PROGRESS NOTES
PATIENT: Fabian Shine  MRN: [id number]  DATE: 8/18/2022      Diagnosis:   1. Cancer, hepatocellular    2. Other specified disorders involving the immune mechanism, not elsewhere classified     3. Anemia in neoplastic disease    4. Atrial fibrillation, unspecified type    5. Coronary artery disease, unspecified vessel or lesion type, unspecified whether angina present, unspecified whether native or transplanted heart    6. Type 2 diabetes mellitus without complication, with long-term current use of insulin    7. History of bladder cancer    8. History of colon cancer        Chief Complaint: Hepatic Cancer    Subjective:     Interval History: Mr. Shine is a 89 y.o. male with A-fib, CAD, DMII, h/o bladder cancer, h/o colon cancer who presents for follow upof metastatic HCC.  Since the last clinci visit the patient was admitted to the hospital from 8/03/22 to 8/07/22 for UTI.  Pt found to have resistant pseudomonas and E faecalis and was discahrged on Amoxicillin and Meropenem with plans for 14 day total course.  The patient then was daignosed with a hematoma in the left arm on 8/09/22.      Currently the patient states he feels well.  He endorses continued weakness.  The patient denies CP, cough, SOB, abdominal pain, N/V, constipation, diarrhea.  The patient denies fever, chills, night sweats, weight loss, new lumps or bumps, easy bruising or bleeding.    Prior History:  The patient has been under the care of Dr Mccall for bladder cancer s/p prior resection and administration of intravesicular BCG.  Last cystoscopy done 12/21/21 had pathology showing no evidence of tumor but did show florid lymphoplasmacytic inflammation in atypical grandular epithelium consistent with severe cystitis.  The patient had prior undergone colonoscopy on 04/17/2018 with invasive adenocarcinoma seen on rectosigmoid biopsy.  The patient underwent partial colectomy on 05/28/2018 with formation of an ostomy with path showing adenocarcinoma  measuring 3.3 cm in greatest dimension with invasion of the muscularis propria, grade 2, negative margins, no lymphovascular invasion, no perineural invasion, and 0/20 lymph nodes positive.  The patient was followed by Dr. Huang and found to have an elevated Ca of 13.3 ng/mL on 05/16/2022.  The patient underwent PET-CT on 06/17/2022 showing  increased tracer centrally within the hepatic parenchyma, bulky lymphadenopathy at the hank hepaticus surrounding the pancreatic head, increased tracer within the L1 vertebral body, and increased uptake in the periaortic and retroperitoneal lymph nodes.   The paitent underwent IR biopsy of the liver on 7/12/22 with results showing carcinoma with morphologic features consistent of adenocarcinoma and hepatocellular carcinoma.  Past Medical History:   Past Medical History:   Diagnosis Date    Anticoagulant long-term use     Atrial fibrillation     Bladder cancer 12/2021    Colon cancer 05/2018    Coronary artery disease     afib    Diabetes mellitus, type 2     General anesthetics causing adverse effect in therapeutic use     hallucinations for several days       Past Surgical HIstory:   Past Surgical History:   Procedure Laterality Date    ANGIOPLASTY Left     LEG    APPENDECTOMY      BLADDER TUMOR EXCISION      x 2    CATARACT EXTRACTION W/  INTRAOCULAR LENS IMPLANT Left 10/31/2018    Procedure: phaco/pciol;  Surgeon: Paola Post MD;  Location: [facility] OR;  Service: Ophthalmology;  Laterality: Left;  topical; will need 10% Phenylephrine, Visitec I-Ring; please use Omidria if available.OMIDRIA ADDED TO   500   ML BSS PLUS    CATARACT EXTRACTION W/  INTRAOCULAR LENS IMPLANT Right     COLONOSCOPY N/A 4/17/2018    Procedure: COLONOSCOPY;  Surgeon: Carlos Redmond MD;  Location: [facility];  Service: Endoscopy;  Laterality: N/A;    COLONOSCOPY N/A 5/21/2019    Procedure: COLONOSCOPY;  Surgeon: Carlos Redmond MD;  Location: [facility];  Service:  Endoscopy;  Laterality: N/A;    COLONOSCOPY N/A 9/9/2020    Procedure: COLONOSCOPY;  Surgeon: Carlos Redmond MD;  Location: [facility] ENDO;  Service: Endoscopy;  Laterality: N/A;    COLOSTOMY N/A 5/28/2018    Procedure: COLOSTOMY;  Surgeon: Willis Huang MD;  Location: [facility] OR;  Service: Colon and Rectal;  Laterality: N/A;    COLOSTOMY      CYSTOSCOPY WITH BIOPSY OF BLADDER N/A 12/21/2021    Procedure: CYSTOSCOPY, WITH BLADDER BIOPSY, WITH FULGURATION IF INDICATED;  Surgeon: WISAM Mccall MD;  Location: [facility] OR;  Service: Urology;  Laterality: N/A;  No BIOPSY    CYSTOSCOPY WITH URETEROSCOPY, RETROGRADE PYELOGRAPHY, AND INSERTION OF STENT Right 1/31/2022    Procedure: CYSTOSCOPY, WITH RETROGRADE PYELOGRAM AND RIGHT URETERAL STENT INSERTION;  Surgeon: WISAM Mccall MD;  Location: [facility] OR;  Service: Urology;  Laterality: Right;    EXTRACORPOREAL SHOCK WAVE LITHOTRIPSY Right 4/12/2022    Procedure: LITHOTRIPSY, ESWL;  Surgeon: WISAM Mccall MD;  Location: [facility] OR;  Service: Urology;  Laterality: Right;    FLEXIBLE SIGMOIDOSCOPY N/A 7/7/2021    Procedure: SIGMOIDOSCOPY, FLEXIBLE;  Surgeon: Willis Huang MD;  Location: [facility];  Service: General;  Laterality: N/A;    HERNIA REPAIR Left     inginual hernia    PHACOEMULSIFICATION OF CATARACT Right 9/5/2018    Procedure: PHACOEMULSIFICATION, CATARACT;  Surgeon: Paola Post MD;  Location: [facility] OR;  Service: Ophthalmology;  Laterality: Right;  topical; will need 10% phenylephrine for dilation. Keep sedation light due to restless legs    tibial stent Right     2 stents to right leg    TONSILLECTOMY      TUMOR REMOVAL      BLADDER       Family History:   Family History   Problem Relation Age of Onset    Heart disease Father        Social History:  reports that he has never smoked. He has never used smokeless tobacco. He reports previous alcohol use. He reports that he does not use drugs.    Allergies:  Review of patient's allergies  "indicates:   Allergen Reactions    Mirtazapine (bulk) Other (See Comments)     Pt becomes disorientated and very fatigued     Bactrim [sulfamethoxazole-trimethoprim] Rash     Rash on chest and back       Medications:  Current Outpatient Medications   Medication Sig Dispense Refill    ALPRAZolam (XANAX) 0.25 MG tablet Take 1 tablet (0.25 mg total) by mouth daily as needed for Insomnia or Anxiety (For insomnia or anxiety.). (Patient taking differently: Take 0.25 mg by mouth every evening.) 30 tablet 1    amoxicillin (AMOXIL) 500 MG capsule Take 1 capsule (500 mg total) by mouth every 12 (twelve) hours. for 13 days 26 capsule 0    BD GARIMA 2ND GEN PEN NEEDLE 32 gauge x 5/32" Ndle 1 each by Other route 4 (four) times daily. 400 each 3    bisoprolol (ZEBETA) 5 MG tablet Take 1 tablet (5 mg total) by mouth once daily. 90 tablet 4    blood sugar diagnostic Strp 1 strip by Misc.(Non-Drug; Combo Route) route 2 (two) times a day. 180 strip 4    blood-glucose meter kit To check BG 2 times daily and for hypo/hyperglycemia, to use with insurance preferred meter. Diagnosis Code: E11.65 1 each 0    clotrimazole-betamethasone 1-0.05% (LOTRISONE) cream Apply topically 2 (two) times daily. 45 g 0    cranberry 400 mg Cap Take 400 mg by mouth with lunch.      digoxin (LANOXIN) 250 mcg tablet Take 1 tablet (250 mcg total) by mouth every Mon, Tues, Wed, Thurs, Fri. Does not take on Sunday      digoxin (LANOXIN) 250 mcg tablet Take 250 mcg by mouth every Saturday.      diltiaZEM (CARDIZEM CD) 240 MG 24 hr capsule Take 1 capsule (240 mg total) by mouth once daily. 90 capsule 4    famotidine (PEPCID) 20 MG tablet TAKE 1 TABLET BY MOUTH ONCE DAILY 30 tablet 2    finasteride (PROSCAR) 5 mg tablet Take 1 tablet (5 mg total) by mouth once daily. (Patient taking differently: Take 5 mg by mouth nightly.) 90 tablet 4    FLOMAX 0.4 mg Cap TAKE 1 CAPSULE ONCE DAILY. (Patient taking differently: Take 0.4 mg by mouth nightly.) 90 " capsule 4    FLUoxetine 10 MG capsule Take 1 capsule (10 mg total) by mouth once daily. (Patient taking differently: Take 10 mg by mouth every evening.) 90 capsule 0    glucos sul 2KCl/msm/chond/C/Mn (GLUCOSAMINE CHONDROITIN ORAL) Take 2 tablets by mouth nightly.      heparin sodium,bovine (HEPARIN, BOVINE, INJ) Inject 5 mLs into the vein As instructed.  5 ml of Heparin (10 units/ml) after 10 cc flush NS      HYDROcodone-acetaminophen (NORCO) 5-325 mg per tablet Take 1 tablet by mouth every 6 (six) hours as needed for Pain. 20 tablet 0    insulin aspart, niacinamide, (FIASP FLEXTOUCH U-100 INSULIN) 100 unit/mL (3 mL) InPn Use with evening meal according to sliding scale. (Patient taking differently: Inject 0-6 Units into the skin before meals as needed (high blood sugar). If bs is over 139mg/dl then give insulin per sliding scale as follows       = 0 units  140-180 = 3 units  181-240 = 4 units  241-300< = 6 units) 1 pen 3    iron polysaccharides (NIFEREX) 150 mg iron Cap Take 1 capsule (150 mg total) by mouth every Mon, Wed, Fri. 40 capsule 4    krill oil 500 mg Cap Take 500 mg by mouth with lunch.      Lactobacillus rhamnosus GG (CULTURELLE) 10 billion cell capsule Take 1 capsule by mouth once daily.      lancets Misc To check BG 2 times daily and for hypo/hyperglycemia, to use with insurance preferred meter. Diagnosis Code: E11.65 60 each 0    LEVEMIR FLEXTOUCH U-100 INSULN 100 unit/mL (3 mL) InPn pen INJECT 18 UNITS UNDER THE SKIN EVERY EVENING 6 each 3    melatonin (MELATIN) Take by mouth every evening.      menthol/zinc oxide (REMEDY CALAZIME INTENSIVE SKIN TOP) Apply 1 application topically 2 (two) times a day.      MEROPENEM IV Inject 1 g into the vein every 8 (eight) hours. Infuse 1 gram Meropenem in 100 mls NS over 30 mintues via disposable elastomeric pump every 8 hours      METANX, ALGAL OIL, 3 mg-35 mg-2 mg -90.314 mg Cap TAKE 1 CAPSULE BY MOUTH ONCE DAILY 90 capsule 4     multivitamin with minerals tablet Take 1 tablet by mouth once daily.      pentoxifylline (TRENTAL) 400 mg TbSR TAKE 1 TABLET BY MOUTH 3  TIMES DAILY WITH MEALS (Patient taking differently: Take 400 mg by mouth 3 (three) times daily with meals. TAKE 1 TABLET BY MOUTH 3  TIMES DAILY WITH MEALS) 270 tablet 4    pravastatin (PRAVACHOL) 10 MG tablet Take 1 tablet (10 mg total) by mouth once daily. (Patient taking differently: Take 10 mg by mouth every morning.) 90 tablet 4    psyllium 0.52 gram capsule Take 0.52 g by mouth once daily.      rivaroxaban (XARELTO) 15 mg Tab Take 1 tablet (15 mg total) by mouth once daily. (Patient taking differently: Take 15 mg by mouth every evening.) 90 tablet 4    rOPINIRole (REQUIP) 0.5 MG tablet TAKE 1 TABLET BY MOUTH WITH EVENING MEAL AND 1 TABLET  AT BEDTIME (Patient taking differently: Take 0.5 mg by mouth 2 (two) times a day. WITH EVENING MEAL AND 1 TABLET  AT BEDTIME) 180 tablet 4    sodium chloride 0.9 %, flush, (NORMAL SALINE FLUSH INJ) Inject 10 mLs into the vein As instructed. IV FLUSH: MIDLINE    SN to Perform/ may Instruct patient/ caregiver to perform IV MIDLINE flush:  Flush before/ after Antibiotic with 10 ml 0.9% Normal saline       No current facility-administered medications for this visit.     Facility-Administered Medications Ordered in Other Visits   Medication Dose Route Frequency Provider Last Rate Last Admin    diphenhydrAMINE injection 50 mg  50 mg Intravenous Once PRN John Patel MD        EPINEPHrine (EPIPEN) 0.3 mg/0.3 mL pen injection 0.3 mg  0.3 mg Intramuscular Once PRN John Patel MD        hydrocortisone sodium succinate injection 100 mg  100 mg Intravenous Once PRN John Patel MD        pembrolizumab (KEYTRUDA) 200 mg in sodium chloride 0.9% 108 mL infusion  200 mg Intravenous 1 time in Clinic/HOD John Patel MD        sodium chloride 0.9% 100 mL flush bag   Intravenous 1 time in Clinic/HOD John Patel MD         sodium chloride 0.9% flush 10 mL  10 mL Intravenous PRN John Patel MD           Review of Systems   Constitutional: Negative for appetite change, chills, diaphoresis, fatigue, fever and unexpected weight change.   HENT: Negative for mouth sores.    Eyes: Negative for visual disturbance.   Respiratory: Negative for cough and shortness of breath.    Cardiovascular: Negative for chest pain and palpitations.   Gastrointestinal: Negative for abdominal pain, constipation, diarrhea, nausea and vomiting.   Genitourinary: Negative for frequency.   Musculoskeletal: Negative for back pain.   Skin: Negative for color change and rash.   Neurological: Positive for weakness. Negative for headaches.   Hematological: Negative for adenopathy. Does not bruise/bleed easily.       ECOG Performance Status: 3   Objective:      Vitals:   Vitals:    08/18/22 0944   BP: (!) 112/58   BP Location: Right arm   Patient Position: Sitting   BP Method: Medium (Manual)   Pulse: (!) 49   Temp: 97.2 °F (36.2 °C)   TempSrc: Temporal   SpO2: 98%   Weight: 73.9 kg (163 lb)   Height: 6' (1.829 m)       Physical Exam  Constitutional:       General: He is not in acute distress.     Appearance: He is well-developed. He is not diaphoretic.   HENT:      Head: Normocephalic and atraumatic.   Cardiovascular:      Rate and Rhythm: Normal rate and regular rhythm.      Heart sounds: Normal heart sounds. No murmur heard.    No friction rub. No gallop.   Pulmonary:      Effort: Pulmonary effort is normal. No respiratory distress.      Breath sounds: Normal breath sounds. No wheezing or rales.   Chest:      Chest wall: No tenderness.   Breasts:      Right: No axillary adenopathy or supraclavicular adenopathy.      Left: No axillary adenopathy or supraclavicular adenopathy.       Abdominal:      General: Bowel sounds are normal. There is no distension.      Palpations: Abdomen is soft. There is no mass.      Tenderness: There is no abdominal tenderness. There is  no rebound.   Musculoskeletal:      Cervical back: Normal range of motion.      Right lower leg: No edema.      Left lower leg: No edema.   Lymphadenopathy:      Cervical: No cervical adenopathy.      Upper Body:      Right upper body: No supraclavicular or axillary adenopathy.      Left upper body: No supraclavicular or axillary adenopathy.   Skin:     General: Skin is warm and dry.      Findings: No erythema or rash.   Neurological:      Mental Status: He is alert and oriented to person, place, and time.   Psychiatric:         Behavior: Behavior normal.         Laboratory Data:  Lab Visit on 08/15/2022   Component Date Value Ref Range Status    WBC 08/15/2022 7.48  3.90 - 12.70 K/uL Final    RBC 08/15/2022 3.74 (A) 4.60 - 6.20 M/uL Final    Hemoglobin 08/15/2022 11.7 (A) 14.0 - 18.0 g/dL Final    Hematocrit 08/15/2022 35.7 (A) 40.0 - 54.0 % Final    MCV 08/15/2022 96  82 - 98 fL Final    MCH 08/15/2022 31.3 (A) 27.0 - 31.0 pg Final    MCHC 08/15/2022 32.8  32.0 - 36.0 g/dL Final    RDW 08/15/2022 15.1 (A) 11.5 - 14.5 % Final    Platelets 08/15/2022 209  150 - 450 K/uL Final    MPV 08/15/2022 9.5  9.2 - 12.9 fL Final    Immature Granulocytes 08/15/2022 0.4  0.0 - 0.5 % Final    Gran # (ANC) 08/15/2022 4.9  1.8 - 7.7 K/uL Final    Immature Grans (Abs) 08/15/2022 0.03  0.00 - 0.04 K/uL Final    Comment: Mild elevation in immature granulocytes is non specific and   can be seen in a variety of conditions including stress response,   acute inflammation, trauma and pregnancy. Correlation with other   laboratory and clinical findings is essential.      Lymph # 08/15/2022 1.5  1.0 - 4.8 K/uL Final    Mono # 08/15/2022 0.9  0.3 - 1.0 K/uL Final    Eos # 08/15/2022 0.2  0.0 - 0.5 K/uL Final    Baso # 08/15/2022 0.03  0.00 - 0.20 K/uL Final    nRBC 08/15/2022 0  0 /100 WBC Final    Gran % 08/15/2022 65.6  38.0 - 73.0 % Final    Lymph % 08/15/2022 20.2  18.0 - 48.0 % Final    Mono % 08/15/2022 11.4  4.0 -  15.0 % Final    Eosinophil % 08/15/2022 2.0  0.0 - 8.0 % Final    Basophil % 08/15/2022 0.4  0.0 - 1.9 % Final    Differential Method 08/15/2022 Automated   Final    Sodium 08/15/2022 136  136 - 145 mmol/L Final    Potassium 08/15/2022 4.2  3.5 - 5.1 mmol/L Final    Chloride 08/15/2022 98  95 - 110 mmol/L Final    CO2 08/15/2022 26  22 - 31 mmol/L Final    Glucose 08/15/2022 100  70 - 110 mg/dL Final    Comment: The ADA recommends the following guidelines for fasting glucose:    Normal:       less than 100 mg/dL    Prediabetes:  100 mg/dL to 125 mg/dL    Diabetes:     126 mg/dL or higher      BUN 08/15/2022 19  9 - 21 mg/dL Final    Creatinine 08/15/2022 0.70  0.50 - 1.40 mg/dL Final    Calcium 08/15/2022 8.3 (A) 8.4 - 10.2 mg/dL Final    Total Protein 08/15/2022 5.9 (A) 6.0 - 8.4 g/dL Final    Albumin 08/15/2022 3.2 (A) 3.5 - 5.2 g/dL Final    Total Bilirubin 08/15/2022 0.6  0.2 - 1.3 mg/dL Final    Alkaline Phosphatase 08/15/2022 108  38 - 145 U/L Final    AST 08/15/2022 58  17 - 59 U/L Final    ALT 08/15/2022 29  0 - 50 U/L Final    Anion Gap 08/15/2022 12  8 - 16 mmol/L Final    eGFR 08/15/2022 >60  >60 mL/min/1.73 m^2 Final    PT 08/15/2022 18.3 (A) 11.8 - 14.7 sec Final    PT normal range is not established for pediatrics.    INR 08/15/2022 1.5   Final    TSH 08/15/2022 1.380  0.400 - 4.000 uIU/mL Final    Comment: Warning:  Heterophilic antibodies in serum or plasma of   certain individuals are known to cause interference with   immunoassays. These antibodies may be present in blood samples   from individuals regularly exposed to animal or who have been   treated with animal products.     Patients taking very high Biotin doses of >300 mcg/day may   cause a negative bias in this assay.      WBC 08/15/2022 7.48  3.90 - 12.70 K/uL Final    RBC 08/15/2022 3.74 (A) 4.60 - 6.20 M/uL Final    Hemoglobin 08/15/2022 11.7 (A) 14.0 - 18.0 g/dL Final    Hematocrit 08/15/2022 35.7 (A) 40.0 - 54.0  % Final    MCV 08/15/2022 96  82 - 98 fL Final    MCH 08/15/2022 31.3 (A) 27.0 - 31.0 pg Final    MCHC 08/15/2022 32.8  32.0 - 36.0 g/dL Final    RDW 08/15/2022 15.1 (A) 11.5 - 14.5 % Final    Platelets 08/15/2022 209  150 - 450 K/uL Final    MPV 08/15/2022 9.5  9.2 - 12.9 fL Final    Immature Granulocytes 08/15/2022 0.4  0.0 - 0.5 % Final    Gran # (ANC) 08/15/2022 4.9  1.8 - 7.7 K/uL Final    Immature Grans (Abs) 08/15/2022 0.03  0.00 - 0.04 K/uL Final    Comment: Mild elevation in immature granulocytes is non specific and   can be seen in a variety of conditions including stress response,   acute inflammation, trauma and pregnancy. Correlation with other   laboratory and clinical findings is essential.      Lymph # 08/15/2022 1.5  1.0 - 4.8 K/uL Final    Mono # 08/15/2022 0.9  0.3 - 1.0 K/uL Final    Eos # 08/15/2022 0.2  0.0 - 0.5 K/uL Final    Baso # 08/15/2022 0.03  0.00 - 0.20 K/uL Final    nRBC 08/15/2022 0  0 /100 WBC Final    Gran % 08/15/2022 65.6  38.0 - 73.0 % Final    Lymph % 08/15/2022 20.2  18.0 - 48.0 % Final    Mono % 08/15/2022 11.4  4.0 - 15.0 % Final    Eosinophil % 08/15/2022 2.0  0.0 - 8.0 % Final    Basophil % 08/15/2022 0.4  0.0 - 1.9 % Final    Differential Method 08/15/2022 Automated   Final    Sodium 08/15/2022 136  136 - 145 mmol/L Final    Potassium 08/15/2022 4.2  3.5 - 5.1 mmol/L Final    Chloride 08/15/2022 98  95 - 110 mmol/L Final    CO2 08/15/2022 26  22 - 31 mmol/L Final    Glucose 08/15/2022 100  70 - 110 mg/dL Final    Comment: The ADA recommends the following guidelines for fasting glucose:    Normal:       less than 100 mg/dL    Prediabetes:  100 mg/dL to 125 mg/dL    Diabetes:     126 mg/dL or higher      BUN 08/15/2022 19  9 - 21 mg/dL Final    Creatinine 08/15/2022 0.70  0.50 - 1.40 mg/dL Final    Calcium 08/15/2022 8.3 (A) 8.4 - 10.2 mg/dL Final    Total Protein 08/15/2022 5.9 (A) 6.0 - 8.4 g/dL Final    Albumin 08/15/2022 3.2 (A) 3.5 - 5.2  g/dL Final    Total Bilirubin 08/15/2022 0.6  0.2 - 1.3 mg/dL Final    Alkaline Phosphatase 08/15/2022 108  38 - 145 U/L Final    AST 08/15/2022 58  17 - 59 U/L Final    ALT 08/15/2022 29  0 - 50 U/L Final    Anion Gap 08/15/2022 12  8 - 16 mmol/L Final    eGFR 08/15/2022 >60  >60 mL/min/1.73 m^2 Final    CRP 08/15/2022 1.40 (A) 0.00 - 0.90 mg/dL Final         Imaging: PET/CT 6/17/22    Quality of the study: Adequate.     There is increased tracer uptake seen centrally within the hepatic parenchyma showing max SUV of 11.  Transmission CT images show an ill-defined low-attenuation lesion in this area measuring approximately 6.5 cm in greatest transaxial dimension.     There is bulky lymphadenopathy seen at the hank hepatis and surrounding the pancreatic head with the max SUV of 10 and largest node measuring is 2.1 cm in short axis.     There is increased tracer uptake within the L1 vertebral body showing max SUV of 6.1.  No discrete lesion is seen in this region on transmission CT images.     There is increased tracer uptake within a periaortic, retroperitoneal node showing max SUV of 9.7.  This measures 1.1 cm in short axis.     Physiologic uptake of the tracer is present within the brain, salivary glands, myocardium, GI and  tracts.     Incidental CT findings: N/A       Assessment:       1. Cancer, hepatocellular    2. Other specified disorders involving the immune mechanism, not elsewhere classified     3. Anemia in neoplastic disease    4. Atrial fibrillation, unspecified type    5. Coronary artery disease, unspecified vessel or lesion type, unspecified whether angina present, unspecified whether native or transplanted heart    6. Type 2 diabetes mellitus without complication, with long-term current use of insulin    7. History of bladder cancer    8. History of colon cancer           Plan:     Metastatic Hepatocellular Carcinoma - PET/CT 6/17/22 showed lesion in the hepatic parenchyma, bulky  lymphadenopathy at the hank hepaticus surrounding the pancreatic head, increased tracer within the L1 vertebral body, and increased uptake in the periaortic and retroperitoneal lymph nodes   -Liver biopsy 7/12/22 consistent with HCC  -Given the patient's performance status, treatment with immunotherapy with Pembrolizumab was pursued  -Will proceed with cycle 2 of pembrolizumab today    Anemia - iron studies 627/22 consistent with anemia of chronic disease  -Hemoglobin was 11.7 on 8/15/22  -ll monitor    A-fib - pt on digoxin, diltiazem, and xarelto  -Management per cardiology    CAD - Xarelto, statin, bisoprolol   -Management per cardiology    DMII - pt on insulin  -Last A1C was 7.5 on 1/27/22  -Stable  -Management per PCP    History of Bladder Cancer - pt monitered by Dr Mccall  -Management per Urology    History of Colon Cancer - pt is s/p hemicolectomy for rectal cancer 5/28/18  -Will monitor    Route Chart for Scheduling    Med Onc Chart Routing      Follow up with physician 3 weeks. The patient can proceed with treatment.  He will need a CBC, CMP,TSH and INR through home health on 9/06/22 with an appt with me and treatmetn on 9/08/22.   Follow up with ZACHARIAH    Infusion scheduling note    Injection scheduling note    Labs    Imaging    Pharmacy appointment    Other referrals          Treatment Plan Information   OP PEMBROLIZUMAB 200MG Q3W   John Patel MD   Upcoming Treatment Dates - OP PEMBROLIZUMAB 200MG Q3W    9/8/2022       Chemotherapy       pembrolizumab (KEYTRUDA) 200 mg in sodium chloride 0.9% 108 mL infusion  9/29/2022       Chemotherapy       pembrolizumab (KEYTRUDA) 200 mg in sodium chloride 0.9% 108 mL infusion  10/20/2022       Chemotherapy       pembrolizumab (KEYTRUDA) 200 mg in sodium chloride 0.9% 108 mL infusion  11/10/2022       Chemotherapy       pembrolizumab (KEYTRUDA) 200 mg in sodium chloride 0.9% 108 mL infusion      John Patel MD  [facility]  Hematology and  Oncology  [facility]   [address] [place] [address]   O: [phone]  F: [phone]

## 2022-08-26 NOTE — TELEPHONE ENCOUNTER
"Returned phone call to Mrs. Shine.  States pt has had nonproductive cough,"...since just about since he started his treatment."  No SOB but,"... he has really been weak."  "He finished his antibiotics on Monday (08/22) for the bladder infection he had."    Pt was taking oral Amoxil, Meropenem 1 GM Q 8 hrs x 14 days.    Please call wife to discuss    "

## 2022-08-26 NOTE — TELEPHONE ENCOUNTER
I called and spoke with the patient's wife whom stated the patient has had a dry cough for the past 3 weeks.  She dneis fever, chills and SOB.  I informed her that the patient could take tylenol with codeine and that we would get a CXR next week.  She expressed understanding.  All questions were answered to her satisfaction.    John Patel MD  [facility]  Hematology and Oncology  [facility]   [address]   [place] [address]   O: [phone]  F: [phone]

## 2022-08-26 NOTE — TELEPHONE ENCOUNTER
----- Message from John Patel MD sent at 8/26/2022  4:24 PM CDT -----  Please have the patient scheduled for a CXR early next week.

## 2022-08-26 NOTE — TELEPHONE ENCOUNTER
Called and spoke with pt's wife in regards to having him scheduled for CXR next week. Pt's wife voiced understanding. Pt is scheduled for 8/30/2022.

## 2022-08-26 NOTE — TELEPHONE ENCOUNTER
----- Message from Angelique Perry sent at 8/26/2022  1:26 PM CDT -----  Type:Needs Medical Advice    Who Called:Clover (Wife)  Best call back number:#[phone]  Additional Info: Requesting a call back regarding#SIRENA has had a dry cough, that has become continuous since starting immunotherapy. He has also started with a runny nose. What can he take to help?>  Please Advise- Thank you

## 2022-09-01 NOTE — TELEPHONE ENCOUNTER
Called and spoke with pt's wife to let her know that we do have the CXR report. Pt's wife verbalized understanding.

## 2022-09-08 NOTE — PLAN OF CARE
Problem: Adult Inpatient Plan of Care  Goal: Patient-Specific Goal (Individualized)  Outcome: Ongoing, Progressing  Flowsheets (Taken 9/8/2022 1145)  Anxieties, Fears or Concerns: None  Individualized Care Needs: Recliner, blanket  Patient-Specific Goals (Include Timeframe): Infection prevention during treatment.     Problem: Fatigue  Goal: Improved Activity Tolerance  Intervention: Promote Improved Energy  Flowsheets (Taken 9/8/2022 1145)  Fatigue Management:   activity schedule adjusted   frequent rest breaks encouraged   paced activity encouraged   fatigue-related activity identified  Sleep/Rest Enhancement:   regular sleep/rest pattern promoted   relaxation techniques promoted  Activity Management:   Ambulated -L4   Ambulated in burgos - L4     Problem: Adult Inpatient Plan of Care  Goal: Plan of Care Review  Outcome: Ongoing, Progressing  Flowsheets (Taken 9/8/2022 1145)  Plan of Care Reviewed With:   patient   spouse  Tolerated treatment with no known distress.  Ambulated from infusion center with steady gait.

## 2022-09-08 NOTE — PROGRESS NOTES
PATIENT: Fabian Shine  MRN: [id number]  DATE: 9/8/2022      Diagnosis:   1. Cancer, hepatocellular    2. Other specified disorders involving the immune mechanism, not elsewhere classified     3. Cloudy urine    4. Anemia in neoplastic disease    5. Atrial fibrillation, unspecified type    6. Coronary artery disease, unspecified vessel or lesion type, unspecified whether angina present, unspecified whether native or transplanted heart    7. Type 2 diabetes mellitus without complication, with long-term current use of insulin    8. History of bladder cancer    9. History of colon cancer          Chief Complaint: Follow-up (Hepatocellular Cancer)    Subjective:     Interval History: Mr. Shine is a 89 y.o. male with A-fib, CAD, DMII, h/o bladder cancer, h/o colon cancer who presents for follow up of metastatic HCC.   Since the last clinic visit the patient received 2 doses of Gentamicin IM for a UTI.  The patient's wife states he continues to have cloudy urine and has been confused on occasion.  She denies fever, chills or complaint of pelvic pain.  The patient denies CP, cough, SOB, abdominal pain, N/V, constipation, diarrhea.  The patient denies night sweats, weight loss, new lumps or bumps, easy bruising or bleeding.    Prior History:  The patient has been under the care of Dr Mccall for bladder cancer s/p prior resection and administration of intravesicular BCG.  Last cystoscopy done 12/21/21 had pathology showing no evidence of tumor but did show florid lymphoplasmacytic inflammation in atypical grandular epithelium consistent with severe cystitis.  The patient had prior undergone colonoscopy on 04/17/2018 with invasive adenocarcinoma seen on rectosigmoid biopsy.  The patient underwent partial colectomy on 05/28/2018 with formation of an ostomy with path showing adenocarcinoma measuring 3.3 cm in greatest dimension with invasion of the muscularis propria, grade 2, negative margins, no lymphovascular invasion,  no perineural invasion, and 0/20 lymph nodes positive.  The patient was followed by Dr. Huang and found to have an elevated Ca of 13.3 ng/mL on 05/16/2022.  The patient underwent PET-CT on 06/17/2022 showing  increased tracer centrally within the hepatic parenchyma, bulky lymphadenopathy at the hank hepaticus surrounding the pancreatic head, increased tracer within the L1 vertebral body, and increased uptake in the periaortic and retroperitoneal lymph nodes.   The paitent underwent IR biopsy of the liver on 7/12/22 with results showing carcinoma with morphologic features consistent of adenocarcinoma and hepatocellular carcinoma.   The patient was admitted to the hospital from 8/03/22 to 8/07/22 for UTI.  Pt found to have resistant pseudomonas and E faecalis and was discahrged on Amoxicillin and Meropenem with plans for 14 day total course.  The patient then was daignosed with a hematoma in the left arm on 8/09/22.     Past Medical History:   Past Medical History:   Diagnosis Date    Anticoagulant long-term use     Atrial fibrillation     Bladder cancer 12/2021    Colon cancer 05/2018    Coronary artery disease     afib    Diabetes mellitus, type 2     General anesthetics causing adverse effect in therapeutic use     hallucinations for several days       Past Surgical HIstory:   Past Surgical History:   Procedure Laterality Date    ANGIOPLASTY Left     LEG    APPENDECTOMY      BLADDER TUMOR EXCISION      x 2    CATARACT EXTRACTION W/  INTRAOCULAR LENS IMPLANT Left 10/31/2018    Procedure: phaco/pciol;  Surgeon: Paola Post MD;  Location: [facility] OR;  Service: Ophthalmology;  Laterality: Left;  topical; will need 10% Phenylephrine, Visitec I-Ring; please use Omidria if available.OMIDRIA ADDED TO   500   ML BSS PLUS    CATARACT EXTRACTION W/  INTRAOCULAR LENS IMPLANT Right     COLONOSCOPY N/A 4/17/2018    Procedure: COLONOSCOPY;  Surgeon: Carlos Redmond MD;  Location: [facility];  Service: Endoscopy;   Laterality: N/A;    COLONOSCOPY N/A 5/21/2019    Procedure: COLONOSCOPY;  Surgeon: Carlos Redmond MD;  Location: [facility] ENDO;  Service: Endoscopy;  Laterality: N/A;    COLONOSCOPY N/A 9/9/2020    Procedure: COLONOSCOPY;  Surgeon: Carlos Redmond MD;  Location: [facility] ENDO;  Service: Endoscopy;  Laterality: N/A;    COLOSTOMY N/A 5/28/2018    Procedure: COLOSTOMY;  Surgeon: Willis Huang MD;  Location: [facility] OR;  Service: Colon and Rectal;  Laterality: N/A;    COLOSTOMY      CYSTOSCOPY WITH BIOPSY OF BLADDER N/A 12/21/2021    Procedure: CYSTOSCOPY, WITH BLADDER BIOPSY, WITH FULGURATION IF INDICATED;  Surgeon: WISAM Mccall MD;  Location: [facility];  Service: Urology;  Laterality: N/A;  No BIOPSY    CYSTOSCOPY WITH URETEROSCOPY, RETROGRADE PYELOGRAPHY, AND INSERTION OF STENT Right 1/31/2022    Procedure: CYSTOSCOPY, WITH RETROGRADE PYELOGRAM AND RIGHT URETERAL STENT INSERTION;  Surgeon: WISAM Mccall MD;  Location: [facility] OR;  Service: Urology;  Laterality: Right;    EXTRACORPOREAL SHOCK WAVE LITHOTRIPSY Right 4/12/2022    Procedure: LITHOTRIPSY, ESWL;  Surgeon: WISAM Mccall MD;  Location: [facility] OR;  Service: Urology;  Laterality: Right;    FLEXIBLE SIGMOIDOSCOPY N/A 7/7/2021    Procedure: SIGMOIDOSCOPY, FLEXIBLE;  Surgeon: Willis Huang MD;  Location: [facility];  Service: General;  Laterality: N/A;    HERNIA REPAIR Left     inginual hernia    PHACOEMULSIFICATION OF CATARACT Right 9/5/2018    Procedure: PHACOEMULSIFICATION, CATARACT;  Surgeon: Paola Post MD;  Location: [facility] OR;  Service: Ophthalmology;  Laterality: Right;  topical; will need 10% phenylephrine for dilation. Keep sedation light due to restless legs    tibial stent Right     2 stents to right leg    TONSILLECTOMY      TUMOR REMOVAL      BLADDER       Family History:   Family History   Problem Relation Age of Onset    Heart disease Father        Social History:  reports that he has never smoked. He has never used  "smokeless tobacco. He reports that he does not currently use alcohol. He reports that he does not use drugs.    Allergies:  Review of patient's allergies indicates:   Allergen Reactions    Mirtazapine (bulk) Other (See Comments)     Pt becomes disorientated and very fatigued     Bactrim [sulfamethoxazole-trimethoprim] Rash     Rash on chest and back       Medications:  Current Outpatient Medications   Medication Sig Dispense Refill    acetaminophen with codeine (ACETAMINOPHEN-CODEINE) 120mg 12mg 5mL Soln Take 5 mLs by mouth every 4 (four) hours as needed (cough). 118 mL 0    ALPRAZolam (XANAX) 0.25 MG tablet Take 1 tablet (0.25 mg total) by mouth daily as needed for Insomnia or Anxiety (For insomnia or anxiety.). (Patient taking differently: Take 0.25 mg by mouth every evening.) 30 tablet 1    BD GARIMA 2ND GEN PEN NEEDLE 32 gauge x 5/32" Ndle 1 each by Other route 4 (four) times daily. 400 each 3    bisoprolol (ZEBETA) 5 MG tablet Take 1 tablet (5 mg total) by mouth once daily. 90 tablet 4    blood sugar diagnostic Strp 1 strip by Misc.(Non-Drug; Combo Route) route 2 (two) times a day. 180 strip 4    blood-glucose meter kit To check BG 2 times daily and for hypo/hyperglycemia, to use with insurance preferred meter. Diagnosis Code: E11.65 1 each 0    clotrimazole-betamethasone 1-0.05% (LOTRISONE) cream Apply topically 2 (two) times daily. 45 g 0    cranberry 400 mg Cap Take 400 mg by mouth with lunch.      digoxin (LANOXIN) 250 mcg tablet Take 1 tablet (250 mcg total) by mouth every Mon, Tues, Wed, Thurs, Fri. Does not take on Sunday      digoxin (LANOXIN) 250 mcg tablet Take 250 mcg by mouth every Saturday.      diltiaZEM (CARDIZEM CD) 240 MG 24 hr capsule Take 1 capsule (240 mg total) by mouth once daily. 90 capsule 4    famotidine (PEPCID) 20 MG tablet TAKE 1 TABLET BY MOUTH ONCE DAILY 30 tablet 2    finasteride (PROSCAR) 5 mg tablet Take 1 tablet (5 mg total) by mouth once daily. (Patient taking differently: Take " 5 mg by mouth nightly.) 90 tablet 4    FLOMAX 0.4 mg Cap TAKE 1 CAPSULE ONCE DAILY. (Patient taking differently: Take 0.4 mg by mouth nightly.) 90 capsule 4    FLUoxetine 10 MG capsule Take 1 capsule (10 mg total) by mouth once daily. (Patient taking differently: Take 10 mg by mouth every evening.) 90 capsule 0    gentamicin (GARAMYCIN) 40 mg/mL injection Inject 40 mg into the muscle once daily. Give IM daily x 2 days 2 mL 0    glucos sul 2KCl/msm/chond/C/Mn (GLUCOSAMINE CHONDROITIN ORAL) Take 2 tablets by mouth nightly.      heparin sodium,bovine (HEPARIN, BOVINE, INJ) Inject 5 mLs into the vein As instructed.  5 ml of Heparin (10 units/ml) after 10 cc flush NS      HYDROcodone-acetaminophen (NORCO) 5-325 mg per tablet Take 1 tablet by mouth every 6 (six) hours as needed for Pain. 20 tablet 0    insulin aspart, niacinamide, (FIASP FLEXTOUCH U-100 INSULIN) 100 unit/mL (3 mL) InPn Use with evening meal according to sliding scale. (Patient taking differently: Inject 0-6 Units into the skin before meals as needed (high blood sugar). If bs is over 139mg/dl then give insulin per sliding scale as follows       = 0 units  140-180 = 3 units  181-240 = 4 units  241-300< = 6 units) 1 pen 3    iron polysaccharides (NIFEREX) 150 mg iron Cap Take 1 capsule (150 mg total) by mouth every Mon, Wed, Fri. 40 capsule 4    krill oil 500 mg Cap Take 500 mg by mouth with lunch.      Lactobacillus rhamnosus GG (CULTURELLE) 10 billion cell capsule Take 1 capsule by mouth once daily.      lancets Misc To check BG 2 times daily and for hypo/hyperglycemia, to use with insurance preferred meter. Diagnosis Code: E11.65 60 each 0    LEVEMIR FLEXTOUCH U-100 INSULN 100 unit/mL (3 mL) InPn pen INJECT 18 UNITS UNDER THE SKIN EVERY EVENING 6 each 3    melatonin (MELATIN) Take 6 mg by mouth every evening.      menthol/zinc oxide (REMEDY CALAZIME INTENSIVE SKIN TOP) Apply 1 application topically 2 (two) times a day.      METANX, ALGAL OIL, 3  mg-35 mg-2 mg -90.314 mg Cap TAKE 1 CAPSULE BY MOUTH ONCE DAILY 90 capsule 4    multivitamin with minerals tablet Take 1 tablet by mouth once daily.      pentoxifylline (TRENTAL) 400 mg TbSR TAKE 1 TABLET BY MOUTH 3  TIMES DAILY WITH MEALS (Patient taking differently: Take 400 mg by mouth 3 (three) times daily with meals. TAKE 1 TABLET BY MOUTH 3  TIMES DAILY WITH MEALS) 270 tablet 4    pravastatin (PRAVACHOL) 10 MG tablet Take 1 tablet (10 mg total) by mouth once daily. (Patient taking differently: Take 10 mg by mouth every morning.) 90 tablet 4    psyllium 0.52 gram capsule Take 0.52 g by mouth once daily.      rivaroxaban (XARELTO) 15 mg Tab Take 1 tablet (15 mg total) by mouth once daily. (Patient taking differently: Take 15 mg by mouth every evening.) 90 tablet 4    rOPINIRole (REQUIP) 0.5 MG tablet TAKE 1 TABLET BY MOUTH WITH EVENING MEAL AND 1 TABLET  AT BEDTIME (Patient taking differently: Take 0.5 mg by mouth 2 (two) times a day. WITH EVENING MEAL AND 1 TABLET  AT BEDTIME) 180 tablet 4    sodium chloride 0.9 %, flush, (NORMAL SALINE FLUSH INJ) Inject 10 mLs into the vein As instructed. IV FLUSH: MIDLINE    SN to Perform/ may Instruct patient/ caregiver to perform IV MIDLINE flush:  Flush before/ after Antibiotic with 10 ml 0.9% Normal saline       No current facility-administered medications for this visit.     Facility-Administered Medications Ordered in Other Visits   Medication Dose Route Frequency Provider Last Rate Last Admin    diphenhydrAMINE injection 50 mg  50 mg Intravenous Once PRN John Patel MD        EPINEPHrine (EPIPEN) 0.3 mg/0.3 mL pen injection 0.3 mg  0.3 mg Intramuscular Once PRN John Patel MD        hydrocortisone sodium succinate injection 100 mg  100 mg Intravenous Once PRN John Patel MD        pembrolizumab (KEYTRUDA) 200 mg in sodium chloride 0.9% 108 mL infusion  200 mg Intravenous 1 time in Clinic/HOD John Patel  mL/hr at 09/08/22 1042 200 mg at  09/08/22 1042    sodium chloride 0.9% flush 10 mL  10 mL Intravenous PRN John Patel MD           Review of Systems   Constitutional:  Negative for appetite change, chills, diaphoresis, fatigue, fever and unexpected weight change.   HENT:  Negative for mouth sores.    Eyes:  Negative for visual disturbance.   Respiratory:  Negative for cough and shortness of breath.    Cardiovascular:  Negative for chest pain and palpitations.   Gastrointestinal:  Negative for abdominal pain, constipation, diarrhea, nausea and vomiting.   Genitourinary:  Negative for frequency.        Cloudy urine   Musculoskeletal:  Negative for back pain.   Skin:  Negative for color change and rash.   Neurological:  Positive for weakness. Negative for headaches.   Hematological:  Negative for adenopathy. Does not bruise/bleed easily.   Psychiatric/Behavioral:  The patient is not nervous/anxious.      ECOG Performance Status: 3   Objective:      Vitals:   Vitals:    09/08/22 0853   BP: (!) 104/58   BP Location: Right arm   Patient Position: Sitting   BP Method: Medium (Manual)   Pulse: 65   Resp: 18   Temp: 98.2 °F (36.8 °C)   TempSrc: Temporal   SpO2: 98%   Height: 6' (1.829 m)       Physical Exam  Constitutional:       General: He is not in acute distress.     Appearance: He is well-developed. He is not diaphoretic.   HENT:      Head: Normocephalic and atraumatic.   Cardiovascular:      Rate and Rhythm: Normal rate and regular rhythm.      Heart sounds: Normal heart sounds. No murmur heard.    No friction rub. No gallop.   Pulmonary:      Effort: Pulmonary effort is normal. No respiratory distress.      Breath sounds: Normal breath sounds. No wheezing or rales.   Chest:      Chest wall: No tenderness.   Abdominal:      General: Bowel sounds are normal. There is no distension.      Palpations: Abdomen is soft. There is no mass.      Tenderness: There is no abdominal tenderness. There is no rebound.   Genitourinary:     Comments: Urin in lott  bag cloudy  Musculoskeletal:      Cervical back: Normal range of motion.      Right lower leg: No edema.      Left lower leg: No edema.   Lymphadenopathy:      Cervical: No cervical adenopathy.      Upper Body:      Right upper body: No supraclavicular or axillary adenopathy.      Left upper body: No supraclavicular or axillary adenopathy.   Skin:     General: Skin is warm and dry.      Findings: No erythema or rash.   Neurological:      Mental Status: He is alert and oriented to person, place, and time.   Psychiatric:         Behavior: Behavior normal.       Laboratory Data:  Lab Visit on 09/06/2022   Component Date Value Ref Range Status    WBC 09/06/2022 10.83  3.90 - 12.70 K/uL Final    RBC 09/06/2022 3.76 (L)  4.60 - 6.20 M/uL Final    Hemoglobin 09/06/2022 11.6 (L)  14.0 - 18.0 g/dL Final    Hematocrit 09/06/2022 35.2 (L)  40.0 - 54.0 % Final    MCV 09/06/2022 94  82 - 98 fL Final    MCH 09/06/2022 30.9  27.0 - 31.0 pg Final    MCHC 09/06/2022 33.0  32.0 - 36.0 g/dL Final    RDW 09/06/2022 14.7 (H)  11.5 - 14.5 % Final    Platelets 09/06/2022 205  150 - 450 K/uL Final    MPV 09/06/2022 9.8  9.2 - 12.9 fL Final    Immature Granulocytes 09/06/2022 0.4  0.0 - 0.5 % Final    Gran # (ANC) 09/06/2022 8.0 (H)  1.8 - 7.7 K/uL Final    Immature Grans (Abs) 09/06/2022 0.04  0.00 - 0.04 K/uL Final    Comment: Mild elevation in immature granulocytes is non specific and   can be seen in a variety of conditions including stress response,   acute inflammation, trauma and pregnancy. Correlation with other   laboratory and clinical findings is essential.      Lymph # 09/06/2022 1.2  1.0 - 4.8 K/uL Final    Mono # 09/06/2022 1.3 (H)  0.3 - 1.0 K/uL Final    Eos # 09/06/2022 0.2  0.0 - 0.5 K/uL Final    Baso # 09/06/2022 0.03  0.00 - 0.20 K/uL Final    nRBC 09/06/2022 0  0 /100 WBC Final    Gran % 09/06/2022 74.3 (H)  38.0 - 73.0 % Final    Lymph % 09/06/2022 10.7 (L)  18.0 - 48.0 % Final    Mono % 09/06/2022 12.2  4.0 - 15.0 %  Final    Eosinophil % 09/06/2022 2.1  0.0 - 8.0 % Final    Basophil % 09/06/2022 0.3  0.0 - 1.9 % Final    Differential Method 09/06/2022 Automated   Final    Sodium 09/06/2022 135 (L)  136 - 145 mmol/L Final    Potassium 09/06/2022 4.3  3.5 - 5.1 mmol/L Final    Chloride 09/06/2022 98  95 - 110 mmol/L Final    CO2 09/06/2022 22  22 - 31 mmol/L Final    Glucose 09/06/2022 212 (H)  70 - 110 mg/dL Final    Comment: The ADA recommends the following guidelines for fasting glucose:    Normal:       less than 100 mg/dL    Prediabetes:  100 mg/dL to 125 mg/dL    Diabetes:     126 mg/dL or higher      BUN 09/06/2022 22 (H)  9 - 21 mg/dL Final    Creatinine 09/06/2022 0.78  0.50 - 1.40 mg/dL Final    Calcium 09/06/2022 8.7  8.4 - 10.2 mg/dL Final    Total Protein 09/06/2022 6.0  6.0 - 8.4 g/dL Final    Albumin 09/06/2022 3.4 (L)  3.5 - 5.2 g/dL Final    Total Bilirubin 09/06/2022 0.7  0.2 - 1.3 mg/dL Final    Alkaline Phosphatase 09/06/2022 161 (H)  38 - 145 U/L Final    AST 09/06/2022 56  17 - 59 U/L Final    ALT 09/06/2022 24  0 - 50 U/L Final    Anion Gap 09/06/2022 15  8 - 16 mmol/L Final    eGFR 09/06/2022 >60  >60 mL/min/1.73 m^2 Final    PT 09/06/2022 19.9 (H)  11.8 - 14.7 sec Final    PT normal range is not established for pediatrics.    INR 09/06/2022 1.7   Final    TSH 09/06/2022 1.480  0.400 - 4.000 uIU/mL Final    Comment: Warning:  Heterophilic antibodies in serum or plasma of   certain individuals are known to cause interference with   immunoassays. These antibodies may be present in blood samples   from individuals regularly exposed to animal or who have been   treated with animal products.     Patients taking very high Biotin doses of >300 mcg/day may   cause a negative bias in this assay.           Imaging: PET/CT 6/17/22    Quality of the study: Adequate.     There is increased tracer uptake seen centrally within the hepatic parenchyma showing max SUV of 11.  Transmission CT images show an ill-defined  low-attenuation lesion in this area measuring approximately 6.5 cm in greatest transaxial dimension.     There is bulky lymphadenopathy seen at the hank hepatis and surrounding the pancreatic head with the max SUV of 10 and largest node measuring is 2.1 cm in short axis.     There is increased tracer uptake within the L1 vertebral body showing max SUV of 6.1.  No discrete lesion is seen in this region on transmission CT images.     There is increased tracer uptake within a periaortic, retroperitoneal node showing max SUV of 9.7.  This measures 1.1 cm in short axis.     Physiologic uptake of the tracer is present within the brain, salivary glands, myocardium, GI and  tracts.     Incidental CT findings: N/A       Assessment:       1. Cancer, hepatocellular    2. Other specified disorders involving the immune mechanism, not elsewhere classified     3. Cloudy urine    4. Anemia in neoplastic disease    5. Atrial fibrillation, unspecified type    6. Coronary artery disease, unspecified vessel or lesion type, unspecified whether angina present, unspecified whether native or transplanted heart    7. Type 2 diabetes mellitus without complication, with long-term current use of insulin    8. History of bladder cancer    9. History of colon cancer             Plan:     Metastatic Hepatocellular Carcinoma - PET/CT 6/17/22 showed lesion in the hepatic parenchyma, bulky lymphadenopathy at the hank hepaticus surrounding the pancreatic head, increased tracer within the L1 vertebral body, and increased uptake in the periaortic and retroperitoneal lymph nodes   -Liver biopsy 7/12/22 consistent with HCC  -Given the patient's performance status, treatment with immunotherapy with Pembrolizumab was pursued  -Will proceed with cycle 3 of pembrolizumab today  -Will repeat PET/Ct prior to next cycle    Cloudy Urine - pt with cloudy urine in lott bag  -Pt recently treated by PCP with Gentamicin  -PCP and Urologist notified  -Pt will  likely need lott replaced    Anemia - iron studies 627/22 consistent with anemia of chronic disease  -Hemoglobin was 11.6 on 9/06/22  -Will monitor    A-fib - pt on digoxin, diltiazem, and xarelto  -Management per cardiology    CAD - Xarelto, statin, bisoprolol   -Management per cardiology    DMII - pt on insulin  -Last A1C was 7.5 on 1/27/22  -Stable  -Management per PCP    History of Bladder Cancer - pt monitered by Dr cMcall  -Management per Urology    History of Colon Cancer - pt is s/p hemicolectomy for rectal cancer 5/28/18  -Will monitor    Route Chart for Scheduling    Med Onc Chart Routing      Follow up with physician 3 weeks. The patient can proceed with treatment.  He will need a PET/CT the week of 9/19/22. He will need a CBC, CMP,TSH and INR through home health on 9/27/22 with an appt with me and treatment on 9/29/22.   Follow up with ZACHARIAH    Infusion scheduling note    Injection scheduling note    Labs    Imaging    Pharmacy appointment    Other referrals        Treatment Plan Information   OP PEMBROLIZUMAB 200MG Q3W   John Patel MD   Upcoming Treatment Dates - OP PEMBROLIZUMAB 200MG Q3W    9/29/2022       Chemotherapy       pembrolizumab (KEYTRUDA) 200 mg in sodium chloride 0.9% 108 mL infusion  10/20/2022       Chemotherapy       pembrolizumab (KEYTRUDA) 200 mg in sodium chloride 0.9% 108 mL infusion  11/10/2022       Chemotherapy       pembrolizumab (KEYTRUDA) 200 mg in sodium chloride 0.9% 108 mL infusion  12/1/2022       Chemotherapy       pembrolizumab (KEYTRUDA) 200 mg in sodium chloride 0.9% 108 mL infusion      John Patel MD  [facility]  Hematology and Oncology  [facility]   [address]   [place] [id number]   O: [phone]  F: [phone]

## 2022-09-08 NOTE — PROGRESS NOTES
ONCOLOGY NUTRITION   FOLLOW UP VISIT        Fabian Shine is a 89 y.o. male.  DATE: 09/08/2022        Oncology Diagnosis:  HCC    REFERRAL FROM:   [] Integrative Oncology   [] Med/Heme Oncology  [] Radiation Oncology  [] Surgical Oncology   [] Infusion Nurse    [x] Routine Nutrition follow up    TREATMENT PLAN:   [] Full treatment plan pending  [x] Chemotherapy  [] Immunotherapy  [] Radiation  [] Concurrent  [] Surgery  [] Treatment complete/post-treatment    ANTHROPOMETRICS:  Wt Readings from Last 10 Encounters:   09/08/22 73.9 kg (163 lb)   08/18/22 73.9 kg (162 lb 14.7 oz)   08/18/22 73.9 kg (163 lb)   08/16/22 73.9 kg (162 lb 14.7 oz)   08/09/22 73.9 kg (162 lb 14.7 oz)   08/08/22 73.9 kg (163 lb)   08/04/22 74 kg (163 lb 2.3 oz)   07/28/22 77.6 kg (171 lb 1.2 oz)   07/28/22 77.6 kg (171 lb)   07/26/22 77.6 kg (171 lb)      Weight Changes: has been stable    PHYSICAL EXAM:  Muscle Wasting Observed:  [] No Deficit   [] Mild Deficit   [] Moderate   [x] Severe    INTAKE:  [x] PO Intake [] TF Intake  Current Diet: regular diet  Dietary Patterns:  Eating meals/snacks as tolerated  [x] Oral nutritional supplements: Glucerna once daily    SYMPTOMS/COMPLAINTS:  [] No nutritional concerns at current  [] Diarrhea                    [] Constipation           [] Nausea                 [] Vomiting                [] Indigestion                [] Reflux              [x] Poor Appetite            [] Anorexia                 [] Early Satiety         [] Gas                       [] Bloating                     [] Dry Mouth    [] Mucositis                   [] Mouth Sores           [] Poor Dentition      [] Difficulty chewing  [] Difficulty Swallowing   [] Pain with swallowing [] Change in taste      [] Change in smell   [] Pain (general)       [] Fatigue                      [] Sleep issues    [] Weight loss  [] other, please specify    Nutrition Re-Assessment Risk: Moderate    [x] Labs reviewed   [x] Meds  reviewed    Education Provided:  [] No Education Needed at this time  [] Diarrhea                                              [] Constipation                          [] Nausea/Vomiting  [] Mucositis                [] Dry Mouth    [] Dealing with changes in Taste/Smell  [x] Dealing with Poor Appetite   [] Soft/moist Diet      [] Weight Loss/Gain     [] Weight Maintenance                           [] Indigestion/GERD                 [] Gas/Bloating          [] Foods High/ Low in specific nutrients [] Increasing Calories/Protein   [] Milkshake/Smoothies Recipes   [x] Nutrition Supplements                        [] Increasing Fluid Intakes         [] Foods that fight cancer    [] Evidence bases resources                 [] Fermented Foods/Probiotics  [] Mediterranean/Plant Based Diet     [] Other, specify                                   [] Handouts provided      [] Samples provided     RD NOTE:  RD met with patient and his wife at chairside during infusion treatment. Pt states he continues to struggle with appetite. Pt eating snacks throughout the day because meals are too much for him. He does do one Glucerna per day. He still is craving sweets.     RD Goals:  [x] Weight stable                  [x] Weight gain                      [] Weight Loss                               [] Continue adequate Kcal/protein   [x] Increase Kcal/protein      [] Adjust Tube-feeding Rx   [] Tolerate Tube Feedings             [] Increase tube feedings to goal     [] Tolerate Supplements     [x] Symptom Improvement   [] Understand nutrition Education  [] Offer supportive visits   [] other, please specify    RECOMMENDATIONS:  Eat smaller, more frequent meals to help meet estimated nutrient needs  Encouraged increasing Glucerna intake if able to tolerate - provided coupons  Consume adequate fluid intake to maintain proper hydration    Follow up: Next infusion or PRN throughout tx    Tigist Ceballos, MS, RD, LDN  09/08/2022  11:46  AM

## 2022-09-12 NOTE — PROGRESS NOTES
The patient location is:  Home  The chief complaint leading to consultation is:  Urinary retention, recurrent urinary tract infection    Visit type: audiovisual    Face to Face time with patient:  25 minutes  25 minutes of total time spent on the encounter, which includes face to face time and non-face to face time preparing to see the patient (eg, review of tests), Obtaining and/or reviewing separately obtained history, Documenting clinical information in the electronic or other health record, Independently interpreting results (not separately reported) and communicating results to the patient/family/caregiver, or Care coordination (not separately reported).         Each patient to whom he or she provides medical services by telemedicine is:  (1) informed of the relationship between the physician and patient and the respective role of any other health care provider with respect to management of the patient; and (2) notified that he or she may decline to receive medical services by telemedicine and may withdraw from such care at any time.      Subjective:       Patient ID: Fabian Shine is a 89 y.o. male.    Chief Complaint: No chief complaint on file.    HPI    89-year-old with a history of bladder cancer.  He has been followed for years for high-grade noninvasive urothelial carcinoma.  Since last seen he has been diagnosed with metastatic liver cancer.  He is on chemotherapy.  He also has urinary retention with an indwelling Kiran catheter.  He was hospitalized 6 months ago for sepsis and had an obstructing stone which has since been treated.  He had a recent urine culture collected from his bag which is predictably positive for multiple organisms including resistant Pseudomonas.  Symptoms include mood swings and confusion.  His wife is concerned that he may be getting septic as these were the same symptoms he was experiencing 6 months ago.  She also inquires about beginning intermittent catheterization rather  than having indwelling Kiran.  Patient himself cannot catheterize himself but his wife who is very attentive wants to try intermittent catheterization if this will decrease his risk of UTIs    Review of Systems   Constitutional:  Negative for fever.   Genitourinary:  Negative for hematuria.     Objective:      Physical Exam    Assessment:       1. Acute cystitis without hematuria    2. History of bladder cancer    3. Enlarged prostate with urinary obstruction    4. Urinary retention          Plan:       Acute cystitis without hematuria  -     LACTIC ACID, PLASMA; Future; Expected date: 09/12/2022    History of bladder cancer    Enlarged prostate with urinary obstruction    Urinary retention      Okay to begin intermittent catheterization 4 times per day.  Recommend lactic acid.  Please refer to Infectious Disease video visit for antibiotic recommendations.  He is already established with Dr. Almaguer.

## 2022-09-15 PROBLEM — C68.9 UROTHELIAL CARCINOMA: Status: ACTIVE | Noted: 2022-01-01

## 2022-09-19 NOTE — PROGRESS NOTES
PET Imaging Questionnaire    Are you a Diabetic? Recent Blood Sugar level? Yes    Are you anemic? Bone Marrow Stimulation Meds? Yes    Have you had a CT Scan, if so when & where was your last one? Yes -     Have you had a PET Scan, if so when & where was your last one? Yes -     Chemotherapy or currently on Chemotherapy? No    Radiation therapy? No    Surgical History:   Past Surgical History:   Procedure Laterality Date    ANGIOPLASTY Left     LEG    APPENDECTOMY      BLADDER TUMOR EXCISION      x 2    CATARACT EXTRACTION W/  INTRAOCULAR LENS IMPLANT Left 10/31/2018    Procedure: phaco/pciol;  Surgeon: Paola Post MD;  Location: [facility] OR;  Service: Ophthalmology;  Laterality: Left;  topical; will need 10% Phenylephrine, Visitec I-Ring; please use Omidria if available.OMIDRIA ADDED TO   500   ML BSS PLUS    CATARACT EXTRACTION W/  INTRAOCULAR LENS IMPLANT Right     COLONOSCOPY N/A 4/17/2018    Procedure: COLONOSCOPY;  Surgeon: Carlos Redmond MD;  Location: [facility];  Service: Endoscopy;  Laterality: N/A;    COLONOSCOPY N/A 5/21/2019    Procedure: COLONOSCOPY;  Surgeon: Carlos Redmond MD;  Location: [facility];  Service: Endoscopy;  Laterality: N/A;    COLONOSCOPY N/A 9/9/2020    Procedure: COLONOSCOPY;  Surgeon: Carlos Redmond MD;  Location: [facility];  Service: Endoscopy;  Laterality: N/A;    COLOSTOMY N/A 5/28/2018    Procedure: COLOSTOMY;  Surgeon: Willis Huang MD;  Location: [facility] OR;  Service: Colon and Rectal;  Laterality: N/A;    COLOSTOMY      CYSTOSCOPY WITH BIOPSY OF BLADDER N/A 12/21/2021    Procedure: CYSTOSCOPY, WITH BLADDER BIOPSY, WITH FULGURATION IF INDICATED;  Surgeon: WISAM Mccall MD;  Location: [facility];  Service: Urology;  Laterality: N/A;  No BIOPSY    CYSTOSCOPY WITH URETEROSCOPY, RETROGRADE PYELOGRAPHY, AND INSERTION OF STENT Right 1/31/2022    Procedure: CYSTOSCOPY, WITH RETROGRADE PYELOGRAM AND RIGHT URETERAL STENT INSERTION;  Surgeon: WISAM Mccall,  MD;  Location: [facility] OR;  Service: Urology;  Laterality: Right;    EXTRACORPOREAL SHOCK WAVE LITHOTRIPSY Right 4/12/2022    Procedure: LITHOTRIPSY, ESWL;  Surgeon: WISAM Mccall MD;  Location: [facility] OR;  Service: Urology;  Laterality: Right;    FLEXIBLE SIGMOIDOSCOPY N/A 7/7/2021    Procedure: SIGMOIDOSCOPY, FLEXIBLE;  Surgeon: Willis Huang MD;  Location: [facility];  Service: General;  Laterality: N/A;    HERNIA REPAIR Left     inginual hernia    PHACOEMULSIFICATION OF CATARACT Right 9/5/2018    Procedure: PHACOEMULSIFICATION, CATARACT;  Surgeon: Paola Post MD;  [facility]  Service: Ophthalmology;  Laterality: Right;  topical; will need 10% phenylephrine for dilation. Keep sedation light due to restless legs    tibial stent Right     2 stents to right leg    TONSILLECTOMY      TUMOR REMOVAL      BLADDER        Have you been fasting for at least 6 hours? Yes    Is there any chance you may be pregnant or breastfeeding? No    Assay: 13.81 MCi@:9:28   Injection Site:RT Forearm    Residual: 1.963 mCi@: 9:30   Technologist: Benedict Pinto Injected:12.12mCi

## 2022-09-21 NOTE — PROGRESS NOTES
Infectious disease department contacted for this patient's urine cultures collected on 09/09/2022.  Positive for Klebsiella and Pseudomonas.    Unable to do IV antibiotics readily will attempt fosfomycin 3 g every 3 days for 3 doses    Diagnoses and all orders for this visit:    Complicated UTI (urinary tract infection)  -     fosfomycin (MONUROL) 3 gram Pack; Take 3 g by mouth every 72 hours. for 3 doses

## 2022-09-21 NOTE — TELEPHONE ENCOUNTER
----- Message from Fabian Calvin sent at 9/21/2022  3:11 PM CDT -----  Type:  Patient Returning Call    Who Called:  Pt's Wife  Who Left Message for Patient:  Santhosh  Does the patient know what this is regarding?:     Best Call Back Number:  [phone]   Additional Information:  Please advise -- Thank you

## 2022-09-21 NOTE — TELEPHONE ENCOUNTER
Spoke with Mrs Shine and advised that I am calling in Fosfomycin po to treat Mr Peralta to [facility] in [place], as [facility] stated they do not have the med on hand and it may be the end of next week before they can get it. Understanding verbalized.

## 2022-09-23 NOTE — TELEPHONE ENCOUNTER
DOCUMENTATION ONLY  Fosfomycin Tromethamine 3gm packets  Approval date: 9/22/2022 to 9/22/2023  Case ID# [id number]

## 2022-09-29 NOTE — TELEPHONE ENCOUNTER
SW attempted to call pt's wife to discuss referral to [facility]. No answer left message for a return call.     Rebeca Bolton LCSW      @ 10:50  Pt's wife returned SW call. SW spoke with her about arranging hospice services with [facility]. She was aware and had discussed this with the doctor. She did become tearful and was worried about using the word hospice with her  and wanted to refer to the service as home health. SW provided emotional support, answered questions and explained hospice can help her with talking about the services and care with her . SW reassured her will inform hospice of her concerns.    Rebeca Bolotn LCSW

## 2022-09-29 NOTE — PROGRESS NOTES
JAKE called [facility] [phone] and spoke with Jerrica. She has the order for hospice and will admit the pt tomorrow. JAKE explained wife has questions and is worried about pt being upset with the word hospice. Jerrica will call her to answer questions address concerns and provide support.     JAKE notified Dr Patel and MAYA Shell that hospice is admitting on 9/30/22.    Rebeca Bolton LCSW

## 2022-09-29 NOTE — PROGRESS NOTES
PATIENT: Fabian Shine  MRN: [id number]  DATE: 9/29/2022      Diagnosis:   1. Cancer, hepatocellular    2. Other specified disorders involving the immune mechanism, not elsewhere classified     3. Anemia in neoplastic disease    4. Atrial fibrillation, unspecified type    5. Coronary artery disease, unspecified vessel or lesion type, unspecified whether angina present, unspecified whether native or transplanted heart    6. Type 2 diabetes mellitus without complication, with long-term current use of insulin    7. History of bladder cancer    8. History of colon cancer            Chief Complaint: Hepatic Cancer    Subjective:     Interval History: Mr. Shine is a 89 y.o. male with A-fib, CAD, DMII, h/o bladder cancer, h/o colon cancer who presents for follow up of metastatic HCC.   Since the last clinic visit the patient underwent PET-CT on 09/19/2022 showing 2 new FDG avid paraesophageal lymph nodes with the largest measuring 0.7 cm; interval enlargement of low density at the GB mass in the abdomen measuring 7.4 x 4.7 cm; increase in size and extent of mesenteric and retroperitoneal lymphadenopathy with a new right periaortic lymph node measuring 1.6 cm; and new trace volume ascites.  Currently the patient states he spends most of the day in bed or a chair.  He endorses weakness.  The patient denies CP, cough, SOB, abdominal pain, N/V, constipation, diarrhea.  The patient denies fever, chills, night sweats, weight loss, new lumps or bumps, easy bruising or bleeding.    Prior History:  The patient has been under the care of Dr Mccall for bladder cancer s/p prior resection and administration of intravesicular BCG.  Last cystoscopy done 12/21/21 had pathology showing no evidence of tumor but did show florid lymphoplasmacytic inflammation in atypical grandular epithelium consistent with severe cystitis.  The patient had prior undergone colonoscopy on 04/17/2018 with invasive adenocarcinoma seen on rectosigmoid biopsy.   The patient underwent partial colectomy on 05/28/2018 with formation of an ostomy with path showing adenocarcinoma measuring 3.3 cm in greatest dimension with invasion of the muscularis propria, grade 2, negative margins, no lymphovascular invasion, no perineural invasion, and 0/20 lymph nodes positive.  The patient was followed by Dr. Huang and found to have an elevated Ca of 13.3 ng/mL on 05/16/2022.  The patient underwent PET-CT on 06/17/2022 showing  increased tracer centrally within the hepatic parenchyma, bulky lymphadenopathy at the hank hepaticus surrounding the pancreatic head, increased tracer within the L1 vertebral body, and increased uptake in the periaortic and retroperitoneal lymph nodes.   The paitent underwent IR biopsy of the liver on 7/12/22 with results showing carcinoma with morphologic features consistent of adenocarcinoma and hepatocellular carcinoma.   The patient was admitted to the hospital from 8/03/22 to 8/07/22 for UTI.  Pt found to have resistant pseudomonas and E faecalis and was discahrged on Amoxicillin and Meropenem with plans for 14 day total course.  The patient then was daignosed with a hematoma in the left arm on 8/09/22.     Past Medical History:   Past Medical History:   Diagnosis Date    Anticoagulant long-term use     Bladder cancer 12/2021    Colon cancer 05/2018    Coronary artery disease     afib    Diabetes mellitus, type 2     General anesthetics causing adverse effect in therapeutic use     hallucinations for several days       Past Surgical HIstory:   Past Surgical History:   Procedure Laterality Date    ANGIOPLASTY Left     LEG    APPENDECTOMY      BLADDER TUMOR EXCISION      x 2    CATARACT EXTRACTION W/  INTRAOCULAR LENS IMPLANT Left 10/31/2018    Procedure: phaco/pciol;  Surgeon: Paola Post MD;  Location: [facility] OR;  Service: Ophthalmology;  Laterality: Left;  topical; will need 10% Phenylephrine, Visitec I-Ring; please use Omidria if  available.OMIDRIA ADDED TO   500   ML BSS PLUS    CATARACT EXTRACTION W/  INTRAOCULAR LENS IMPLANT Right     COLONOSCOPY N/A 4/17/2018    Procedure: COLONOSCOPY;  Surgeon: Carlos Redmond MD;  Location: [facility] ENDO;  Service: Endoscopy;  Laterality: N/A;    COLONOSCOPY N/A 5/21/2019    Procedure: COLONOSCOPY;  Surgeon: Carlos Redmond MD;  Location: [facility] ENDO;  Service: Endoscopy;  Laterality: N/A;    COLONOSCOPY N/A 9/9/2020    Procedure: COLONOSCOPY;  Surgeon: Carlos Redmond MD;  Location: [facility];  Service: Endoscopy;  Laterality: N/A;    COLOSTOMY N/A 5/28/2018    Procedure: COLOSTOMY;  Surgeon: Willis Huang MD;  Location: [facility];  Service: Colon and Rectal;  Laterality: N/A;    COLOSTOMY      CYSTOSCOPY WITH BIOPSY OF BLADDER N/A 12/21/2021    Procedure: CYSTOSCOPY, WITH BLADDER BIOPSY, WITH FULGURATION IF INDICATED;  Surgeon: WISAM Mccall MD;  Location: [facility] OR;  Service: Urology;  Laterality: N/A;  No BIOPSY    CYSTOSCOPY WITH URETEROSCOPY, RETROGRADE PYELOGRAPHY, AND INSERTION OF STENT Right 1/31/2022    Procedure: CYSTOSCOPY, WITH RETROGRADE PYELOGRAM AND RIGHT URETERAL STENT INSERTION;  Surgeon: WISAM Mccall MD;  Location: [facility] OR;  Service: Urology;  Laterality: Right;    EXTRACORPOREAL SHOCK WAVE LITHOTRIPSY Right 4/12/2022    Procedure: LITHOTRIPSY, ESWL;  Surgeon: WISAM Mccall MD;  Location: [facility] OR;  Service: Urology;  Laterality: Right;    FLEXIBLE SIGMOIDOSCOPY N/A 7/7/2021    Procedure: SIGMOIDOSCOPY, FLEXIBLE;  Surgeon: Willis Huang MD;  Location: [facility];  Service: General;  Laterality: N/A;    HERNIA REPAIR Left     inginual hernia    PHACOEMULSIFICATION OF CATARACT Right 9/5/2018    Procedure: PHACOEMULSIFICATION, CATARACT;  Surgeon: Paola Post MD;  Location: [facility] OR;  Service: Ophthalmology;  Laterality: Right;  topical; will need 10% phenylephrine for dilation. Keep sedation light due to restless legs    tibial stent Right     2 stents  "to right leg    TONSILLECTOMY      TUMOR REMOVAL      BLADDER       Family History:   Family History   Problem Relation Age of Onset    Heart disease Father        Social History:  reports that he has never smoked. He has never used smokeless tobacco. He reports that he does not currently use alcohol. He reports that he does not use drugs.    Allergies:  Review of patient's allergies indicates:   Allergen Reactions    Mirtazapine (bulk) Other (See Comments)     Pt becomes disorientated and very fatigued     Bactrim [sulfamethoxazole-trimethoprim] Rash     Rash on chest and back       Medications:  Current Outpatient Medications   Medication Sig Dispense Refill    acetaminophen with codeine (ACETAMINOPHEN-CODEINE) 120mg 12mg 5mL Soln Take 5 mLs by mouth every 4 (four) hours as needed (cough). 118 mL 0    ALPRAZolam (XANAX) 0.25 MG tablet Take 1 tablet (0.25 mg total) by mouth daily as needed for Insomnia or Anxiety (For insomnia or anxiety.). 30 tablet 1    BD GARIMA 2ND GEN PEN NEEDLE 32 gauge x 5/32" Ndle 1 each by Other route 4 (four) times daily. 400 each 3    bisoprolol (ZEBETA) 5 MG tablet Take 1 tablet (5 mg total) by mouth once daily. 90 tablet 4    blood sugar diagnostic Strp 1 strip by Misc.(Non-Drug; Combo Route) route 2 (two) times a day. 180 strip 4    blood-glucose meter kit To check BG 2 times daily and for hypo/hyperglycemia, to use with insurance preferred meter. Diagnosis Code: E11.65 1 each 0    clotrimazole-betamethasone 1-0.05% (LOTRISONE) cream Apply topically 2 (two) times daily. 45 g 0    cranberry 400 mg Cap Take 400 mg by mouth with lunch.      digoxin (LANOXIN) 250 mcg tablet Tab 1 po daily except on Sunday (6 days/week) 75 tablet 1    diltiaZEM (CARDIZEM CD) 240 MG 24 hr capsule Take 1 capsule (240 mg total) by mouth once daily. 90 capsule 4    famotidine (PEPCID) 20 MG tablet TAKE 1 TABLET BY MOUTH ONCE DAILY 30 tablet 2    finasteride (PROSCAR) 5 mg tablet Take 1 tablet (5 mg total) by " mouth once daily. (Patient taking differently: Take 5 mg by mouth nightly.) 90 tablet 4    FLOMAX 0.4 mg Cap TAKE 1 CAPSULE ONCE DAILY. (Patient taking differently: Take 0.4 mg by mouth nightly.) 90 capsule 4    FLUoxetine 10 MG capsule Take 1 capsule (10 mg total) by mouth once daily. 90 capsule 0    fosfomycin (MONUROL) 3 gram Pack Take 1 packet (3 g) by mouth as directed every 72 hours. for 3 doses 9 g 0    glucos sul 2KCl/msm/chond/C/Mn (GLUCOSAMINE CHONDROITIN ORAL) Take 2 tablets by mouth nightly.      HYDROcodone-acetaminophen (NORCO) 5-325 mg per tablet Take 1 tablet by mouth every 6 (six) hours as needed for Pain. 20 tablet 0    insulin aspart, niacinamide, (FIASP FLEXTOUCH U-100 INSULIN) 100 unit/mL (3 mL) InPn Use with evening meal according to sliding scale. (Patient taking differently: Inject 0-6 Units into the skin before meals as needed (high blood sugar). If bs is over 139mg/dl then give insulin per sliding scale as follows       = 0 units  140-180 = 3 units  181-240 = 4 units  241-300< = 6 units) 1 pen 3    iron polysaccharides (NIFEREX) 150 mg iron Cap Take 1 capsule (150 mg total) by mouth every Mon, Wed, Fri. 40 capsule 4    krill oil 500 mg Cap Take 500 mg by mouth with lunch.      Lactobacillus rhamnosus GG (CULTURELLE) 10 billion cell capsule Take 1 capsule by mouth once daily.      lancets Misc To check BG 2 times daily and for hypo/hyperglycemia, to use with insurance preferred meter. Diagnosis Code: E11.65 60 each 0    LEVEMIR FLEXTOUCH U-100 INSULN 100 unit/mL (3 mL) InPn pen INJECT 18 UNITS UNDER THE SKIN EVERY EVENING 6 each 3    melatonin (MELATIN) Take 6 mg by mouth every evening.      menthol/zinc oxide (REMEDY CALAZIME INTENSIVE SKIN TOP) Apply 1 application topically 2 (two) times a day.      METANX, ALGAL OIL, 3 mg-35 mg-2 mg -90.314 mg Cap TAKE 1 CAPSULE BY MOUTH ONCE DAILY 90 capsule 4    multivitamin with minerals tablet Take 1 tablet by mouth once daily.       pentoxifylline (TRENTAL) 400 mg TbSR TAKE 1 TABLET BY MOUTH 3  TIMES DAILY WITH MEALS (Patient taking differently: Take 400 mg by mouth 3 (three) times daily with meals. TAKE 1 TABLET BY MOUTH 3  TIMES DAILY WITH MEALS) 270 tablet 4    pravastatin (PRAVACHOL) 10 MG tablet Take 1 tablet (10 mg total) by mouth once daily. (Patient taking differently: Take 10 mg by mouth every morning.) 90 tablet 4    psyllium 0.52 gram capsule Take 0.52 g by mouth once daily.      rivaroxaban (XARELTO) 15 mg Tab Take 1 tablet (15 mg total) by mouth once daily. (Patient taking differently: Take 15 mg by mouth every evening.) 90 tablet 4    rOPINIRole (REQUIP) 0.5 MG tablet TAKE 1 TABLET BY MOUTH WITH EVENING MEAL AND 1 TABLET  AT BEDTIME (Patient taking differently: Take 0.5 mg by mouth 2 (two) times a day. WITH EVENING MEAL AND 1 TABLET  AT BEDTIME) 180 tablet 4     No current facility-administered medications for this visit.       Review of Systems   Constitutional:  Negative for appetite change, chills, diaphoresis, fatigue, fever and unexpected weight change.   Eyes:  Negative for visual disturbance.   Respiratory:  Negative for cough and shortness of breath.    Cardiovascular:  Negative for chest pain and palpitations.   Gastrointestinal:  Negative for abdominal pain, constipation, diarrhea, nausea and vomiting.   Genitourinary:  Negative for frequency.   Musculoskeletal:  Negative for back pain.   Skin:  Negative for color change and rash.   Neurological:  Positive for weakness. Negative for headaches.   Hematological:  Negative for adenopathy. Does not bruise/bleed easily.   Psychiatric/Behavioral:  The patient is not nervous/anxious.      ECOG Performance Status: 3   Objective:      Vitals:   Vitals:    09/29/22 0922   BP: (P) 122/64   BP Location: (P) Left arm   Patient Position: (P) Sitting   BP Method: (P) Medium (Automatic)   Resp: (P) 18   Temp: (P) 97.3 °F (36.3 °C)   TempSrc: (P) Temporal   SpO2: (P) 98%       Physical  Exam  Constitutional:       General: He is not in acute distress.     Appearance: He is well-developed. He is not diaphoretic.   HENT:      Head: Normocephalic and atraumatic.   Cardiovascular:      Rate and Rhythm: Normal rate and regular rhythm.      Heart sounds: Normal heart sounds. No murmur heard.    No friction rub. No gallop.   Pulmonary:      Effort: Pulmonary effort is normal. No respiratory distress.      Breath sounds: Normal breath sounds. No wheezing or rales.   Chest:      Chest wall: No tenderness.   Abdominal:      General: Bowel sounds are normal. There is no distension.      Palpations: Abdomen is soft. There is no mass.      Tenderness: There is no abdominal tenderness. There is no rebound.   Musculoskeletal:      Cervical back: Normal range of motion.      Right lower leg: No edema.      Left lower leg: No edema.   Lymphadenopathy:      Cervical: No cervical adenopathy.      Upper Body:      Right upper body: No supraclavicular or axillary adenopathy.      Left upper body: No supraclavicular or axillary adenopathy.   Skin:     General: Skin is warm and dry.      Findings: No erythema or rash.   Neurological:      Mental Status: He is alert and oriented to person, place, and time.   Psychiatric:         Behavior: Behavior normal.       Laboratory Data:  Lab Visit on 09/27/2022   Component Date Value Ref Range Status    WBC 09/27/2022 10.66  3.90 - 12.70 K/uL Final    RBC 09/27/2022 3.98 (L)  4.60 - 6.20 M/uL Final    Hemoglobin 09/27/2022 12.4 (L)  14.0 - 18.0 g/dL Final    Hematocrit 09/27/2022 37.6 (L)  40.0 - 54.0 % Final    MCV 09/27/2022 95  82 - 98 fL Final    MCH 09/27/2022 31.2 (H)  27.0 - 31.0 pg Final    MCHC 09/27/2022 33.0  32.0 - 36.0 g/dL Final    RDW 09/27/2022 15.0 (H)  11.5 - 14.5 % Final    Platelets 09/27/2022 202  150 - 450 K/uL Final    MPV 09/27/2022 9.7  9.2 - 12.9 fL Final    Immature Granulocytes 09/27/2022 0.5  0.0 - 0.5 % Final    Gran # (ANC) 09/27/2022 7.5  1.8 - 7.7  K/uL Final    Immature Grans (Abs) 09/27/2022 0.05 (H)  0.00 - 0.04 K/uL Final    Comment: Mild elevation in immature granulocytes is non specific and   can be seen in a variety of conditions including stress response,   acute inflammation, trauma and pregnancy. Correlation with other   laboratory and clinical findings is essential.      Lymph # 09/27/2022 1.5  1.0 - 4.8 K/uL Final    Mono # 09/27/2022 1.2 (H)  0.3 - 1.0 K/uL Final    Eos # 09/27/2022 0.3  0.0 - 0.5 K/uL Final    Baso # 09/27/2022 0.04  0.00 - 0.20 K/uL Final    nRBC 09/27/2022 0  0 /100 WBC Final    Gran % 09/27/2022 70.1  38.0 - 73.0 % Final    Lymph % 09/27/2022 14.4 (L)  18.0 - 48.0 % Final    Mono % 09/27/2022 11.4  4.0 - 15.0 % Final    Eosinophil % 09/27/2022 3.2  0.0 - 8.0 % Final    Basophil % 09/27/2022 0.4  0.0 - 1.9 % Final    Differential Method 09/27/2022 Automated   Final    Sodium 09/27/2022 138  136 - 145 mmol/L Final    Potassium 09/27/2022 4.9  3.5 - 5.1 mmol/L Final    Chloride 09/27/2022 100  95 - 110 mmol/L Final    CO2 09/27/2022 25  22 - 31 mmol/L Final    Glucose 09/27/2022 117 (H)  70 - 110 mg/dL Final    Comment: The ADA recommends the following guidelines for fasting glucose:    Normal:       less than 100 mg/dL    Prediabetes:  100 mg/dL to 125 mg/dL    Diabetes:     126 mg/dL or higher      BUN 09/27/2022 15  9 - 21 mg/dL Final    Creatinine 09/27/2022 0.66  0.50 - 1.40 mg/dL Final    Calcium 09/27/2022 8.9  8.4 - 10.2 mg/dL Final    Total Protein 09/27/2022 6.2  6.0 - 8.4 g/dL Final    Albumin 09/27/2022 3.4 (L)  3.5 - 5.2 g/dL Final    Total Bilirubin 09/27/2022 0.6  0.2 - 1.3 mg/dL Final    Alkaline Phosphatase 09/27/2022 154 (H)  38 - 145 U/L Final    AST 09/27/2022 55  17 - 59 U/L Final    ALT 09/27/2022 24  0 - 50 U/L Final    Anion Gap 09/27/2022 13  8 - 16 mmol/L Final    eGFR 09/27/2022 >60  >60 mL/min/1.73 m^2 Final    PT 09/27/2022 17.9 (H)  11.8 - 14.7 sec Final    PT normal range is not established for  pediatrics.    INR 09/27/2022 1.5   Final    TSH 09/27/2022 1.570  0.400 - 4.000 uIU/mL Final    Comment: Warning:  Heterophilic antibodies in serum or plasma of   certain individuals are known to cause interference with   immunoassays. These antibodies may be present in blood samples   from individuals regularly exposed to animal or who have been   treated with animal products.     Patients taking very high Biotin doses of >300 mcg/day may   cause a negative bias in this assay.           Imaging: PET/CT 9/19/22    HEAD: Diffuse atrophy is partially visualized.  Physiologic FDG uptake is present throughout the brain.     NECK: Physiologic FDG uptake is present throughout the neck.  No FDG avid cervical lymphadenopathy is present.     CHEST: There are 2 new FDG avid paraesophageal lymph nodes.  Larger of the 2 (image 112) measures 0.7 cm short axis dimension.  SUV max is 11.7.  No other evidence of FDG avid thoracic lymphadenopathy.     No FDG avid pulmonary nodule.  No lung mass.  There is dependent atelectasis and/or scarring.     ABDOMEN/PELVIS: Interval enlargement of central low-density FDG avid mass.  Borders are difficult to delineate with CT.  Mass measures approximately 7.4 x 4.7 cm with SUV max of 25.9, previously measuring 6.5 x 3.7 cm.  There are new nodular components of the mass which extend cranially to the hepatic dome     Mesenteric and retroperitoneal lymphadenopathy is increased in size and extent compared to the prior.  No lymphadenopathy is present.  Index measurements below:     *Bulky conglomerate at the hank hepatis measures 4.0 cm short axis dimension with SUV max of 25.9.  This area previously measured 2.1 cm short axis with SUV max of 10.0.  *New right periaortic lymph node (image 172) measures 1.6 cm short axis dimension with SUV max of 25.2.  New, trace volume ascites is present.  Physiologic FDG uptake is present throughout the large and small bowel.  Kiran catheter is present within a  air and fluid-filled bladder.     BONES/SOFT TISSUES: Mild FDG uptake localizes to the left sternomanubrial articulation.  This is likely degenerative.  No FDG avid osteolytic or osteoblastic lesion.       Assessment:       1. Cancer, hepatocellular    2. Other specified disorders involving the immune mechanism, not elsewhere classified     3. Anemia in neoplastic disease    4. Atrial fibrillation, unspecified type    5. Coronary artery disease, unspecified vessel or lesion type, unspecified whether angina present, unspecified whether native or transplanted heart    6. Type 2 diabetes mellitus without complication, with long-term current use of insulin    7. History of bladder cancer    8. History of colon cancer               Plan:     Metastatic Hepatocellular Carcinoma - PET/CT 6/17/22 showed lesion in the hepatic parenchyma, bulky lymphadenopathy at the hank hepaticus surrounding the pancreatic head, increased tracer within the L1 vertebral body, and increased uptake in the periaortic and retroperitoneal lymph nodes   -Liver biopsy 7/12/22 consistent with HCC  -Given the patient's performance status, treatment with immunotherapy with Pembrolizumab was pursued  -Pt completed 3 cycls of pembrolizumab 9/08/22  -PET/CT on 9/19/22 showed progression of disease with newly enlarged LN's  -Pt with Child Mosley Score of B7  -It was discussed that the next standard treatment to consider was Sorafenib but that there was concern the patient would not tolerate treatment given his immobility and that it would likely impact his quality of life  -Pt and family given information on Sorafenib   -Pt's family decided on hospice but will continue to consider treatment up until Hospice initiated    Anemia - iron studies 627/22 consistent with anemia of chronic disease  -Hemoglobin was 12.4 on 9/27/22    A-fib - pt on digoxin, diltiazem, and xarelto  -Management per cardiology    CAD - Xarelto, statin, bisoprolol   -Management per  cardiology    DMII - pt on insulin  -Last A1C was 7.5 on 1/27/22  -Stable  -Management per PCP    History of Bladder Cancer - pt monitered by Dr Mccall  -Management per Urology    History of Colon Cancer - pt is s/p hemicolectomy for rectal cancer 5/28/18  -Will monitor    Route Chart for Scheduling    Med Onc Chart Routing      Follow up with physician Other. The patient will not get treatment as he has progressed.  He will need to be set up for hospice.   Follow up with ZACHARIAH    Infusion scheduling note    Injection scheduling note    Labs    Imaging    Pharmacy appointment    Other referrals        Treatment Plan Information   OP PEMBROLIZUMAB 200MG Q3W   John Patel MD   Upcoming Treatment Dates - OP PEMBROLIZUMAB 200MG Q3W    9/29/2022       Chemotherapy       pembrolizumab (KEYTRUDA) 200 mg in sodium chloride 0.9% 108 mL infusion  10/20/2022       Chemotherapy       pembrolizumab (KEYTRUDA) 200 mg in sodium chloride 0.9% 108 mL infusion  11/10/2022       Chemotherapy       pembrolizumab (KEYTRUDA) 200 mg in sodium chloride 0.9% 108 mL infusion  12/1/2022       Chemotherapy       pembrolizumab (KEYTRUDA) 200 mg in sodium chloride 0.9% 108 mL infusion      John Patel MD  [facility]  Hematology and Oncology  [facility]   [address] [place] [address]   O: [phone]  F: [phone]

## 2022-09-30 NOTE — TELEPHONE ENCOUNTER
2:00PM   SW called to follow up with pt's wife regarding hospice referral yesterday. She said she has decided not to use [facility]. She has spoken with two different hospice companies and is deciding between [facility] and [facility]. She has not made her final decision.   She will be speaking to Barb with [facility] after 3:30PM. Wife asked SW to send over information to [facility] before she makes choice.  SW will send information over to them when order is updated.     JAKE spoke with MAYA Sehll with Dr Patel to requested hospice order be adjusted to remove [facility] from order.       @3:45PM   SW received updated hospice orders.   SW called [facility] [phone] regarding the above conversation with pt's wife. JAKE faxed orders, face sheet and clinic note to [facility] to fax [phone]. Received fax confirmation fax was successfully sent to the listed fax number at 3:47PM.     @4:00PM   JAKE called [facility] spoke to Vanessa [phone] regarding the above conversation with pt's wife. JAKE faxed orders, face sheet and clinic note to [facility] to fax [phone]. Received fax confirmation fax was successfully sent o [phone] @ 4:07PM.     @4:10PM SW spoke with pt's wife explained orders sent to both hospice companies she is deciding between. When she decides which hospice service she wants  she can call them to proceed with admit. She voiced understanding.     Rebeca Bolton LCSW

## 2022-10-02 NOTE — PROGRESS NOTES
PATIENT: Fabian Shine  MRN: [id number]  DATE: 7/20/2022      Diagnosis:   1. Cancer, hepatocellular    2. Other specified disorders involving the immune mechanism, not elsewhere classified     3. Anemia in neoplastic disease    4. Atrial fibrillation, unspecified type    5. Coronary artery disease, unspecified vessel or lesion type, unspecified whether angina present, unspecified whether native or transplanted heart    6. Type 2 diabetes mellitus without complication, with long-term current use of insulin    7. History of bladder cancer    8. History of colon cancer        Chief Complaint: Follow-up (Hepatocellular Carcinoma)    Subjective:     Interval History: Mr. Shine is a 89 y.o. male with A-fib, CAD, DMII, h/o bladder cancer, h/o colon cancer who presents for follow upof metastatic HCC.  Sicne the last clinic visit the lorenat underwent IR biopsy of the liver on 7/12/22 with results showing carcinoma with morphologic features consistent of adenocarcinoma and hepatocellular carcinoma.    Prior History:  The patient has been under the care of Dr Mccall for bladder cancer s/p prior resection and administration of intravesicular BCG.  Last cystoscopy done 12/21/21 had pathology showing no evidence of tumor but did show florid lymphoplasmacytic inflammation in atypical grandular epithelium consistent with severe cystitis.  The patient had prior undergone colonoscopy on 04/17/2018 with invasive adenocarcinoma seen on rectosigmoid biopsy.  The patient underwent partial colectomy on 05/28/2018 with formation of an ostomy with path showing adenocarcinoma measuring 3.3 cm in greatest dimension with invasion of the muscularis propria, grade 2, negative margins, no lymphovascular invasion, no perineural invasion, and 0/20 lymph nodes positive.  The patient was followed by Dr. Huang and found to have an elevated Ca of 13.3 ng/mL on 05/16/2022.  The patient underwent PET-CT on 06/17/2022 showing  increased tracer  centrally within the hepatic parenchyma, bulky lymphadenopathy at the hank hepaticus surrounding the pancreatic head, increased tracer within the L1 vertebral body, and increased uptake in the periaortic and retroperitoneal lymph nodes.    Past Medical History:   Past Medical History:   Diagnosis Date    Anticoagulant long-term use     Atrial fibrillation     Bladder cancer 12/2021    Colon cancer 05/2018    Coronary artery disease     afib    Diabetes mellitus, type 2     General anesthetics causing adverse effect in therapeutic use     hallucinations for several days       Past Surgical HIstory:   Past Surgical History:   Procedure Laterality Date    ANGIOPLASTY Left     LEG    APPENDECTOMY      BLADDER TUMOR EXCISION      x 2    CATARACT EXTRACTION W/  INTRAOCULAR LENS IMPLANT Left 10/31/2018    Procedure: phaco/pciol;  Surgeon: Paola Post MD;  Location: [facility];  Service: Ophthalmology;  Laterality: Left;  topical; will need 10% Phenylephrine, Visitec I-Ring; please use Omidria if available.OMIDRIA ADDED TO   500   ML BSS PLUS    CATARACT EXTRACTION W/  INTRAOCULAR LENS IMPLANT Right     COLONOSCOPY N/A 4/17/2018    Procedure: COLONOSCOPY;  Surgeon: Carlos Redmond MD;  Location: [facility];  Service: Endoscopy;  Laterality: N/A;    COLONOSCOPY N/A 5/21/2019    Procedure: COLONOSCOPY;  Surgeon: Carlos Redmond MD;  Location: [facility];  Service: Endoscopy;  Laterality: N/A;    COLONOSCOPY N/A 9/9/2020    Procedure: COLONOSCOPY;  Surgeon: Carlos Redmond MD;  Location: [facility];  Service: Endoscopy;  Laterality: N/A;    COLOSTOMY N/A 5/28/2018    Procedure: COLOSTOMY;  Surgeon: Willis Huang MD;  Location: [facility];  Service: Colon and Rectal;  Laterality: N/A;    COLOSTOMY      CYSTOSCOPY WITH BIOPSY OF BLADDER N/A 12/21/2021    Procedure: CYSTOSCOPY, WITH BLADDER BIOPSY, WITH FULGURATION IF INDICATED;  Surgeon: WISAM Mccall MD;  [facility];   "Service: Urology;  Laterality: N/A;  No BIOPSY    CYSTOSCOPY WITH URETEROSCOPY, RETROGRADE PYELOGRAPHY, AND INSERTION OF STENT Right 1/31/2022    Procedure: CYSTOSCOPY, WITH RETROGRADE PYELOGRAM AND RIGHT URETERAL STENT INSERTION;  Surgeon: WISAM Mccall MD;  Location: [facility] OR;  Service: Urology;  Laterality: Right;    EXTRACORPOREAL SHOCK WAVE LITHOTRIPSY Right 4/12/2022    Procedure: LITHOTRIPSY, ESWL;  Surgeon: WISAM Mccall MD;  Location: [facility] OR;  Service: Urology;  Laterality: Right;    FLEXIBLE SIGMOIDOSCOPY N/A 7/7/2021    Procedure: SIGMOIDOSCOPY, FLEXIBLE;  Surgeon: Willis Huang MD;  Location: [facility];  Service: General;  Laterality: N/A;    HERNIA REPAIR Left     inginual hernia    PHACOEMULSIFICATION OF CATARACT Right 9/5/2018    Procedure: PHACOEMULSIFICATION, CATARACT;  Surgeon: Paola Post MD;  [facility];  Service: Ophthalmology;  Laterality: Right;  topical; will need 10% phenylephrine for dilation. Keep sedation light due to restless legs    tibial stent Right     2 stents to right leg    TONSILLECTOMY      TUMOR REMOVAL      BLADDER       Family History:   Family History   Problem Relation Age of Onset    Heart disease Father        Social History:  reports that he has never smoked. He has never used smokeless tobacco. He reports previous alcohol use. He reports that he does not use drugs.    Allergies:  Review of patient's allergies indicates:   Allergen Reactions    Mirtazapine (bulk) Other (See Comments)     Pt becomes disorientated and very fatigued     Bactrim [sulfamethoxazole-trimethoprim] Rash     Rash on chest and back       Medications:  Current Outpatient Medications   Medication Sig Dispense Refill    ALPRAZolam (XANAX) 0.25 MG tablet Take 1 tablet (0.25 mg total) by mouth daily as needed for Insomnia or Anxiety (For insomnia or anxiety.). 30 tablet 1    BD GARIMA 2ND GEN PEN NEEDLE 32 gauge x 5/32" Ndle       bisoprolol (ZEBETA) 5 MG " tablet Take 1 tablet (5 mg total) by mouth once daily. 90 tablet 4    blood sugar diagnostic Strp 1 strip by Misc.(Non-Drug; Combo Route) route 2 (two) times a day. 180 strip 4    blood-glucose meter kit To check BG 2 times daily and for hypo/hyperglycemia, to use with insurance preferred meter. Diagnosis Code: E11.65 1 each 0    clotrimazole-betamethasone 1-0.05% (LOTRISONE) cream Apply topically 2 (two) times daily. 45 g 0    cranberry 400 mg Cap Take 400 mg by mouth once daily.      digoxin (LANOXIN) 250 mcg tablet Take 1 tablet (250 mcg total) by mouth once daily. (Patient taking differently: Take 250 mcg by mouth once daily at 6am. Does not take on Sunday) 90 tablet 4    diltiaZEM (CARDIZEM CD) 240 MG 24 hr capsule Take 1 capsule (240 mg total) by mouth once daily. 90 capsule 4    famotidine (PEPCID) 20 MG tablet TAKE 1 TABLET BY MOUTH ONCE DAILY 30 tablet 2    finasteride (PROSCAR) 5 mg tablet Take 1 tablet (5 mg total) by mouth once daily. (Patient taking differently: Take 5 mg by mouth nightly.) 90 tablet 4    FLOMAX 0.4 mg Cap TAKE 1 CAPSULE ONCE DAILY. (Patient taking differently: Take 0.4 mg by mouth nightly.) 90 capsule 4    FLUoxetine 10 MG capsule Take 1 capsule (10 mg total) by mouth once daily. 90 capsule 0    glucos sul 2KCl/msm/chond/C/Mn (GLUCOSAMINE CHONDROITIN ORAL) Take 2 tablets by mouth nightly.      HYDROcodone-acetaminophen (NORCO) 5-325 mg per tablet Take 1 tablet by mouth every 6 (six) hours as needed for Pain. 20 tablet 0    insulin aspart, niacinamide, (FIASP FLEXTOUCH U-100 INSULIN) 100 unit/mL (3 mL) InPn Use with evening meal according to sliding scale. (Patient taking differently: Inject 0-6 Units into the skin before meals as needed (high blood sugar). If bs is over 139mg/dl then give insulin per sliding scale as follows       = 0 units  140-180 = 3 units  181-240 = 4 units  241-300< = 6 units) 1 pen 3    iron polysaccharides (NIFEREX) 150 mg iron Cap Take 1  capsule (150 mg total) by mouth every Mon, Wed, Fri. 40 capsule 4    KRILL OIL ORAL Take 1 capsule by mouth once daily.      Lactobacillus rhamnosus GG (CULTURELLE) 10 billion cell capsule Take 1 capsule by mouth once daily.      lancets Misc To check BG 2 times daily and for hypo/hyperglycemia, to use with insurance preferred meter. Diagnosis Code: E11.65 60 each 0    LEVEMIR FLEXTOUCH U-100 INSULN 100 unit/mL (3 mL) InPn pen INJECT 18 UNITS UNDER THE SKIN EVERY EVENING (Patient taking differently: Inject 18 Units into the skin every evening.) 6 each 3    megestroL (MEGACE) 40 MG Tab Take 1 tablet (40 mg total) by mouth 2 (two) times daily. 60 tablet 2    menthol/zinc oxide (REMEDY CALAZIME INTENSIVE SKIN TOP) Apply 1 application topically 2 (two) times a day.      METANX, ALGAL OIL, 3 mg-35 mg-2 mg -90.314 mg Cap TAKE 1 CAPSULE BY MOUTH ONCE DAILY (Patient taking differently: Take 1 capsule by mouth once daily.) 90 capsule 4    multivitamin with minerals tablet Take 1 tablet by mouth once daily.      pentoxifylline (TRENTAL) 400 mg TbSR TAKE 1 TABLET BY MOUTH 3  TIMES DAILY WITH MEALS (Patient taking differently: Take 400 mg by mouth 3 (three) times daily with meals. TAKE 1 TABLET BY MOUTH 3  TIMES DAILY WITH MEALS) 270 tablet 4    pravastatin (PRAVACHOL) 10 MG tablet Take 1 tablet (10 mg total) by mouth once daily. 90 tablet 4    psyllium 0.52 gram capsule Take 0.52 g by mouth once daily.      rivaroxaban (XARELTO) 15 mg Tab Take 1 tablet (15 mg total) by mouth once daily. (Patient taking differently: Take 15 mg by mouth every evening.) 90 tablet 4    rOPINIRole (REQUIP) 0.5 MG tablet TAKE 1 TABLET BY MOUTH WITH EVENING MEAL AND 1 TABLET  AT BEDTIME (Patient taking differently: Take 0.5 mg by mouth 2 (two) times a day. WITH EVENING MEAL AND 1 TABLET  AT BEDTIME) 180 tablet 4     No current facility-administered medications for this visit.       Review of Systems   Constitutional: Positive for  fatigue. Negative for chills, diaphoresis, fever and unexpected weight change.   Respiratory: Negative for cough and shortness of breath.    Cardiovascular: Negative for chest pain and palpitations.   Gastrointestinal: Positive for abdominal pain. Negative for constipation, diarrhea, nausea and vomiting.   Skin: Negative for color change and rash.   Neurological: Negative for headaches.   Hematological: Negative for adenopathy. Does not bruise/bleed easily.       ECOG Performance Status: 3   Objective:      Vitals:   Vitals:    07/20/22 1117   BP: 100/60   BP Location: Right arm   Patient Position: Sitting   BP Method: Medium (Manual)   Pulse: 61   Temp: 97.6 °F (36.4 °C)   TempSrc: Temporal   SpO2: 99%   Weight: 78 kg (172 lb)   Height: 6' (1.829 m)       Physical Exam  Constitutional:       General: He is not in acute distress.     Appearance: He is well-developed. He is not diaphoretic.   HENT:      Head: Normocephalic and atraumatic.   Cardiovascular:      Rate and Rhythm: Normal rate and regular rhythm.      Heart sounds: Normal heart sounds. No murmur heard.    No friction rub. No gallop.   Pulmonary:      Effort: Pulmonary effort is normal. No respiratory distress.      Breath sounds: Normal breath sounds. No wheezing or rales.   Chest:      Chest wall: No tenderness.   Breasts:      Right: No axillary adenopathy or supraclavicular adenopathy.      Left: No axillary adenopathy or supraclavicular adenopathy.       Abdominal:      General: Bowel sounds are normal. There is no distension.      Palpations: Abdomen is soft. There is no mass.      Tenderness: There is no abdominal tenderness. There is no rebound.   Musculoskeletal:      Cervical back: Normal range of motion.      Right lower leg: Edema present.      Left lower leg: Edema present.   Lymphadenopathy:      Cervical: No cervical adenopathy.      Upper Body:      Right upper body: No supraclavicular or axillary adenopathy.      Left upper body: No  supraclavicular or axillary adenopathy.   Skin:     General: Skin is warm and dry.      Findings: No erythema or rash.   Neurological:      Mental Status: He is alert and oriented to person, place, and time.   Psychiatric:         Behavior: Behavior normal.         Laboratory Data:  No visits with results within 1 Week(s) from this visit.   Latest known visit with results is:   Hospital Outpatient Visit on 07/12/2022   Component Date Value Ref Range Status    Creatinine 07/12/2022 0.79  0.50 - 1.40 mg/dL Final    eGFR if African American 07/12/2022 >60  >60 mL/min/1.73 m^2 Final    eGFR if non African American 07/12/2022 >60  >60 mL/min/1.73 m^2 Final    Comment: Calculation used to obtain the estimated glomerular filtration  rate (eGFR) is the CKD-EPI equation.       WBC 07/12/2022 13.65 (A) 3.90 - 12.70 K/uL Final    RBC 07/12/2022 4.48 (A) 4.60 - 6.20 M/uL Final    Hemoglobin 07/12/2022 13.8 (A) 14.0 - 18.0 g/dL Final    Hematocrit 07/12/2022 41.6  40.0 - 54.0 % Final    MCV 07/12/2022 93  82 - 98 fL Final    MCH 07/12/2022 30.8  27.0 - 31.0 pg Final    MCHC 07/12/2022 33.2  32.0 - 36.0 g/dL Final    RDW 07/12/2022 14.5  11.5 - 14.5 % Final    Platelets 07/12/2022 189  150 - 450 K/uL Final    MPV 07/12/2022 9.8  9.2 - 12.9 fL Final    PT 07/12/2022 14.2  11.8 - 14.7 sec Final    PT normal range is not established for pediatrics.    INR 07/12/2022 1.1   Final    aPTT 07/12/2022 31.7  24.6 - 36.7 sec Final    PTT normal range is not established for pediatrics.    BUN 07/12/2022 20  9 - 21 mg/dL Final         Imaging: PET/CT 6/17/22    Quality of the study: Adequate.     There is increased tracer uptake seen centrally within the hepatic parenchyma showing max SUV of 11.  Transmission CT images show an ill-defined low-attenuation lesion in this area measuring approximately 6.5 cm in greatest transaxial dimension.     There is bulky lymphadenopathy seen at the hank hepatis and surrounding the  pancreatic head with the max SUV of 10 and largest node measuring is 2.1 cm in short axis.     There is increased tracer uptake within the L1 vertebral body showing max SUV of 6.1.  No discrete lesion is seen in this region on transmission CT images.     There is increased tracer uptake within a periaortic, retroperitoneal node showing max SUV of 9.7.  This measures 1.1 cm in short axis.     Physiologic uptake of the tracer is present within the brain, salivary glands, myocardium, GI and  tracts.     Incidental CT findings: N/A       Assessment:       1. Cancer, hepatocellular    2. Other specified disorders involving the immune mechanism, not elsewhere classified     3. Anemia in neoplastic disease    4. Atrial fibrillation, unspecified type    5. Coronary artery disease, unspecified vessel or lesion type, unspecified whether angina present, unspecified whether native or transplanted heart    6. Type 2 diabetes mellitus without complication, with long-term current use of insulin    7. History of bladder cancer    8. History of colon cancer           Plan:     Metastatic Hepatocellular Carcinoma - PET/CT 6/17/22 showed lesion in the hepatic parenchyma, bulky lymphadenopathy at the hank hepaticus surrounding the pancreatic head, increased tracer within the L1 vertebral body, and increased uptake in the periaortic and retroperitoneal lymph nodes   -Liver biopsy 7/12/22 consistent with HCC  -Given the patient's performance status, will pursue treatment with immunotherapy with PEmbrolizumab  -Pt to attend chemo school    Anemia - iron studies 627/22 consistent with anemia of chronic disease  -Hemoglobin was 13.7 on 7/12/22  -wIll monitor    A-fib - pt on digoxin, diltiazem, and xarelto  -Management per cardiology    CAD - Xarelto, statin, bisoprolol   -Management per cardiology    DMII - pt on insulin  -Last A1C was 7.5 on 1/27/22  -Stable  -Management per PCP    History of Bladder Cancer - pt monitered by   [facility]  -Pt with indwelling urinary catheter  -Management per Urology    History of Colon Cancer - pt is s/p hemicolectomy for rectal cancer 5/28/18  -Recent biopsy more consistent with HCC    Route Chart for Scheduling    Med Onc Chart Routing      Follow up with physician Other. The patient needs approval for pembrolizumab.  He needs to be set up for chemo school.  He needs a CBC, CMP adn TSH on the first day of treatment with an appt with me that day.   Follow up with ZACHARIAH    Infusion scheduling note    Injection scheduling note    Labs    Imaging    Pharmacy appointment    Other referrals          Treatment Plan Information   OP PEMBROLIZUMAB 200MG Q3W   John Patel MD   Upcoming Treatment Dates - OP PEMBROLIZUMAB 200MG Q3W    7/27/2022       Chemotherapy       pembrolizumab (KEYTRUDA) 200 mg in sodium chloride 0.9% 108 mL infusion  8/17/2022       Chemotherapy       pembrolizumab (KEYTRUDA) 200 mg in sodium chloride 0.9% 108 mL infusion  9/7/2022       Chemotherapy       pembrolizumab (KEYTRUDA) 200 mg in sodium chloride 0.9% 108 mL infusion  9/28/2022       Chemotherapy       pembrolizumab (KEYTRUDA) 200 mg in sodium chloride 0.9% 108 mL infusion      John Patel MD  [facility]  Hematology and Oncology  [facility]   [address] [place] [address]   O: [phone]  F: [phone]           No

## 2022-10-03 NOTE — TELEPHONE ENCOUNTER
JAKE spoke with pt's wife, Kassy to follow up with hospice referral. Kassy said she has spoke with both [facility] and [facility] over the weekend. She said she has not accepted a service yet. She has an appointment set for [facility] tomorrow, Tuesday 10/4/22 to assess the pt. Kassy said if she likes them when they come she will go with [facility], if not she will call [facility] to set up an eval.      JAKE updated Dr Patel via Epic.     Rebeca Bolton, PERLITAW

## 2022-10-06 NOTE — TELEPHONE ENCOUNTER
JAKE called pt's wife to follow up on hospice referrals. She confirmed that [facility] has admitted the pt and has started seeing the pt in his home.     JAKE will update Dr. Patel of the above.     Rebeca Bolton, PERLITAW

## 2022-12-19 PROBLEM — Z00.00 ANNUAL PHYSICAL EXAM: Status: RESOLVED | Noted: 2021-09-16 | Resolved: 2022-12-19
